# Patient Record
Sex: FEMALE | Race: WHITE | NOT HISPANIC OR LATINO | Employment: OTHER | URBAN - METROPOLITAN AREA
[De-identification: names, ages, dates, MRNs, and addresses within clinical notes are randomized per-mention and may not be internally consistent; named-entity substitution may affect disease eponyms.]

---

## 2017-03-13 ENCOUNTER — OFFICE VISIT (OUTPATIENT)
Dept: INTERNAL MEDICINE | Facility: CLINIC | Age: 72
End: 2017-03-13
Payer: MEDICARE

## 2017-03-13 ENCOUNTER — HOSPITAL ENCOUNTER (OUTPATIENT)
Dept: RADIOLOGY | Facility: HOSPITAL | Age: 72
Discharge: HOME | End: 2017-03-13
Attending: INTERNAL MEDICINE
Payer: MEDICARE

## 2017-03-13 VITALS
SYSTOLIC BLOOD PRESSURE: 120 MMHG | BODY MASS INDEX: 23.25 KG/M2 | OXYGEN SATURATION: 98 % | WEIGHT: 148.13 LBS | RESPIRATION RATE: 16 BRPM | DIASTOLIC BLOOD PRESSURE: 76 MMHG | HEART RATE: 65 BPM | HEIGHT: 67 IN

## 2017-03-13 DIAGNOSIS — J30.81 ALLERGIC RHINITIS DUE TO ANIMAL HAIR AND DANDER, UNSPECIFIED RHINITIS SEASONALITY: ICD-10-CM

## 2017-03-13 DIAGNOSIS — Z86.39 HISTORY OF THYROIDITIS: Primary | ICD-10-CM

## 2017-03-13 DIAGNOSIS — Z78.0 ASYMPTOMATIC MENOPAUSAL STATE: ICD-10-CM

## 2017-03-13 DIAGNOSIS — Z87.828 HISTORY OF EYE TRAUMA: ICD-10-CM

## 2017-03-13 DIAGNOSIS — Z00.00 HEALTH CARE MAINTENANCE: ICD-10-CM

## 2017-03-13 DIAGNOSIS — Z13.6 ENCOUNTER FOR SCREENING FOR CARDIOVASCULAR DISORDERS: ICD-10-CM

## 2017-03-13 DIAGNOSIS — Z85.828 HISTORY OF SCC (SQUAMOUS CELL CARCINOMA) OF SKIN: ICD-10-CM

## 2017-03-13 DIAGNOSIS — H53.9 VISION CHANGES: ICD-10-CM

## 2017-03-13 PROCEDURE — 77080 DXA BONE DENSITY AXIAL: CPT | Mod: TC,PO

## 2017-03-13 PROCEDURE — 99999 PR PBB SHADOW E&M-EST. PATIENT-LVL IV: CPT | Mod: PBBFAC,,, | Performed by: INTERNAL MEDICINE

## 2017-03-13 PROCEDURE — 99204 OFFICE O/P NEW MOD 45 MIN: CPT | Mod: S$PBB,,, | Performed by: INTERNAL MEDICINE

## 2017-03-13 PROCEDURE — 77080 DXA BONE DENSITY AXIAL: CPT | Mod: 26,,, | Performed by: RADIOLOGY

## 2017-03-13 RX ORDER — CARBOXYMETHYLCELLULOSE SODIUM 5 MG/ML
1 SOLUTION/ DROPS OPHTHALMIC 3 TIMES DAILY PRN
COMMUNITY

## 2017-03-13 RX ORDER — CALCIUM CARBONATE 600 MG
600 TABLET ORAL 2 TIMES DAILY WITH MEALS
COMMUNITY
End: 2017-05-22 | Stop reason: ALTCHOICE

## 2017-03-13 NOTE — PROGRESS NOTES
"HISTORY OF PRESENT ILLNESS:  Pt. is a 71 y.o. female presents to establish care from Dr. Elias Schmitz and monitoring of her mild renal insufficiency, hypothyroidism,osteopenia.  She recently had allergic episode with cat allergy with wheezing.  Deosn't wish to see allergist.  Has history of abnormal mammogram:    6/13/16:  "late follow-up from prior benign  ultrasound biopsy of the left 1:00, 3 cm from the nipple, lesion. Pathology was  fibrocystic change with apocrine metaplasia and papillary apocrine metaplasia.  Usual ductal hyperplasia with epithelial/luminal calcifications. Focal  sclerosing adenosis with epithelial calcifications. Focally, there are features  of a radial scar/complex sclerosing lesion. Hemosiderin laden macrophages are  identified focally. She has not had surgery."    She is to repeat 6/17.    Lab Results   Component Value Date    ALT 26 01/13/2017    AST 21 01/13/2017     01/13/2017    K 4.5 01/13/2017     01/13/2017    CREATININE 1.03 01/13/2017    BUN 17 01/13/2017    CO2 29 01/13/2017    TSH 2.110 01/13/2017     She has not had CBC or Lipid panel.  TSH is WNL on current meds.  She had remote history of anemia.  Had gone to a vision center 3 months ago after trauma to eye.  She would like to see someone different.      ROS:  GENERAL: No fever, chills, fatigability or weight loss.  SKIN: No rashes, itching or changes in color or texture of skin; has history of SCC of right arm, approx. 2010;  HEAD: No headaches or recent head trauma.  EARS: Denies ear pain, discharge or vertigo.  NOSE: No loss of smell, no epistaxis; positive postnasal drip.  MOUTH & THROAT: No hoarseness or change in voice. No excessive gum bleeding.  NODES: Denies swollen glands.  CHEST: Denies NELSON, cyanosis, occ wheezing with allergic response, no cough and sputum production.  CARDIOVASCULAR: Denies chest pain, PND, orthopnea or reduced exercise tolerance.  ABDOMEN: Appetite fine. No weight loss. Denies " constipation, diarrhea, abdominal pain, hematemesis or blood in stool.  URINARY: No flank pain, dysuria or hematuria.  PERIPHERAL VASCULAR: No claudication or cyanosis. No edema.  MUSCULOSKELETAL: No joint stiffness or swelling. Denies back pain; is developing bunion;  NEUROLOGIC: Denies numbness    PE:   Vitals:   Vitals:    03/13/17 0827   BP: 120/76   Pulse: 65   Resp: 16     GENERAL: no acute distress, A&Ox3, comfortable.  Female with BMI of 23   HEENT: tympanic membranes clear, nasal mucosa pink, no pharyngeal erythema or exudate  NECK: supple, no cervical lymphadenopathy, no thyromegaly; no supraclavicular nodes;   CHEST:  clear to auscultation bilaterally, no crackles or wheeze; no increased work of breathing;  CARDIOVASCULAR: regular rate and rhythm, no rubs, murmurs or gallops.  ABDOMEN: normal bowel sounds, soft non-tender, non-distended; no palpable organomegaly;   EXT: no clubbing, cyanosis or edema.     ASSESSMENT/PLAN:    History of thyroiditis  -     THYROID PEROXIDASE ANTIBODY; Future; Expected date: 3/13/17    History of SCC (squamous cell carcinoma) of skin  -     Ambulatory referral to Dermatology    [facility] care maintenance  -     Pneumococcal Conjugate Vaccine (13 Valent) (IM)  -     Case request GI: COLONOSCOPY  -     CBC auto differential; Future; Expected date: 3/13/17  -     Hepatitis C antibody; Future; Expected date: 3/13/17  -     Lipid panel; Future; Expected date: 3/13/17  -     DXA Bone Density Spine And Hip_Axial Skeleton; Future; Expected date: 3/13/17    Allergic rhinitis due to animal hair and dander, unspecified rhinitis seasonality  -     CBC auto differential; Future; Expected date: 3/13/17    Encounter for screening for cardiovascular disorders   -     Lipid panel; Future; Expected date: 3/13/17    History of eye trauma    Vision changes  -     Ambulatory referral to Ophthalmology    Asymptomatic menopausal state   -     DXA Bone Density Spine And Hip_Axial Skeleton; Future;  Expected date: 3/13/17      Call if condition changes or worsens.

## 2017-03-13 NOTE — MR AVS SNAPSHOT
[facility]  [address]  [facility] [address]  Phone: [phone]  Fax: [phone]                  Sandhya Madrigal   3/13/2017 8:00 AM   Office Visit    Description:  Female : 1945   Provider:  Nithya Liao MD   Department:  [facility] Medicine           Reason for Visit     Establish Care           Diagnoses this Visit        Comments    History of thyroiditis    -  Primary     History of SCC (squamous cell carcinoma) of skin         Health care maintenance         Allergic rhinitis due to animal hair and dander, unspecified rhinitis seasonality         Encounter for screening for cardiovascular disorders         History of eye trauma         Vision changes         Asymptomatic menopausal state                To Do List           Future Appointments        Provider Department Dept Phone    3/13/2017 12:00 PM [facility] DEXA1 [facility] [phone]      Goals (5 Years of Data)     None      [facility]     [facility] On Call Nurse Care Line - 24/7 Assistance  Registered nurses in the [facility] provide clinical advisement, health education, appointment booking, and other advisory services.  Call for this free service at [phone].             Medications           Message regarding Medications     Verify the changes and/or additions to your medication regime listed below are the same as discussed with your clinician today.  If any of these changes or additions are incorrect, please notify your healthcare provider.             Verify that the below list of medications is an accurate representation of the medications you are currently taking.  If none reported, the list may be blank. If incorrect, please contact your healthcare provider. Carry this list with you in case of emergency.           Current Medications     calcium carbonate (OS-LIS) 600 mg (1,500 mg) Tab Take 600 mg by mouth 2 (two) times daily with meals.    CALCIUM  "CARBONATE/VITAMIN D3 (VITAMIN D-3 ORAL) Take by mouth.    carboxymethylcellulose (REFRESH PLUS) 0.5 % Dpet 1 drop 3 (three) times daily as needed.    cetirizine (ZYRTEC) 10 MG tablet CETIRIZINE HCL 10 MG TABS    fluticasone (FLONASE) 50 mcg/actuation nasal spray 2 sprays by Each Nare route once daily. FLUTICASONE PROPIONATE 50 MCG/ACT SUSP    levothyroxine (SYNTHROID) 75 MCG tablet Take 1 tablet (75 mcg total) by mouth before breakfast. LEVOTHYROXINE SODIUM 75 MCG TABS           Clinical Reference Information           Your Vitals Were     BP Pulse Resp Height Weight SpO2    120/76 65 16 5' 7" (1.702 m) 67.2 kg (148 lb 2.4 oz) 98%    BMI                23.2 kg/m2          Blood Pressure          Most Recent Value    BP  120/76      Allergies as of 3/13/2017     Sulfa (Sulfonamide Antibiotics)    Pcn [Penicillins]    Rifampin      Immunizations Administered on Date of Encounter - 3/13/2017     Name Date Dose VIS Date Route    Pneumococcal Conjugate - 13 Valent 3/13/2017 0.5 mL 11/5/2015 Intramuscular      Orders Placed During Today's Visit      Normal Orders This Visit    Ambulatory referral to Dermatology     Ambulatory referral to Ophthalmology     Case request GI: COLONOSCOPY     Pneumococcal Conjugate Vaccine (13 Valent) (IM)     Future Labs/Procedures Expected by Expires    CBC auto differential  3/13/2017 6/11/2017    DXA Bone Density Spine And Hip_Axial Skeleton  3/13/2017 6/11/2017    Hepatitis C antibody  3/13/2017 5/12/2018    Lipid panel  3/13/2017 5/12/2018    THYROID PEROXIDASE ANTIBODY  3/13/2017 5/12/2018      Language Assistance Services     ATTENTION: Language assistance services are available, free of charge. Please call [phone].      ATENCIÓN: Si habla español, tiene a bryant disposición servicios gratuitos de asistencia lingüística. Llame al [phone].     CHÚ Ý: N?u b?n nói Ti?ng Vi?t, có các d?ch v? h? tr? ngôn ng? mi?n phí dành cho b?n. G?i s? [phone].         [facility] complies with applicable Federal civil rights laws and does not discriminate on the basis of race, color, national origin, age, disability, or sex.

## 2017-03-14 PROBLEM — Z86.39 HISTORY OF THYROIDITIS: Status: ACTIVE | Noted: 2017-03-14

## 2017-03-14 PROBLEM — Z85.828 HISTORY OF SCC (SQUAMOUS CELL CARCINOMA) OF SKIN: Status: ACTIVE | Noted: 2017-03-14

## 2017-03-14 PROBLEM — Z87.828 HISTORY OF EYE TRAUMA: Status: ACTIVE | Noted: 2017-03-14

## 2017-03-14 PROBLEM — J30.81 ALLERGIC RHINITIS DUE TO ANIMAL HAIR AND DANDER: Status: ACTIVE | Noted: 2017-03-14

## 2017-03-14 PROBLEM — H53.9 VISION CHANGES: Status: ACTIVE | Noted: 2017-03-14

## 2017-03-15 ENCOUNTER — PATIENT MESSAGE (OUTPATIENT)
Dept: GASTROENTEROLOGY | Facility: CLINIC | Age: 72
End: 2017-03-15

## 2017-03-16 DIAGNOSIS — E03.9 HYPOTHYROIDISM (ACQUIRED): ICD-10-CM

## 2017-03-16 RX ORDER — FLUTICASONE PROPIONATE 50 MCG
2 SPRAY, SUSPENSION (ML) NASAL DAILY
Qty: 1 BOTTLE | Refills: 3 | Status: SHIPPED | OUTPATIENT
Start: 2017-03-16 | End: 2018-03-16 | Stop reason: SDUPTHER

## 2017-03-16 RX ORDER — LEVOTHYROXINE SODIUM 75 UG/1
75 TABLET ORAL
Qty: 90 TABLET | Refills: 3 | Status: SHIPPED | OUTPATIENT
Start: 2017-03-16 | End: 2018-03-14 | Stop reason: SDUPTHER

## 2017-03-21 NOTE — TELEPHONE ENCOUNTER
----- Message from Lucrecia Alexander sent at 3/21/2017  9:37 AM CDT -----  Contact: self    Needs to know if she needs a podiatrist referral. Please call back at .

## 2017-04-07 ENCOUNTER — TELEPHONE (OUTPATIENT)
Dept: GASTROENTEROLOGY | Facility: CLINIC | Age: 72
End: 2017-04-07

## 2017-04-07 NOTE — TELEPHONE ENCOUNTER
----- Message from Ruthie Jorge sent at 4/7/2017 12:22 PM CDT -----  Contact: patient   Patient calling in regards to her upcoming Colonoscopy on 5/2/17. She wants to reschedule for a Friday. Please advise.  Call back .  Thanks!

## 2017-04-10 ENCOUNTER — TELEPHONE (OUTPATIENT)
Dept: INTERNAL MEDICINE | Facility: CLINIC | Age: 72
End: 2017-04-10

## 2017-04-10 NOTE — TELEPHONE ENCOUNTER
Spoke to pt  Rescheduled colonoscopy for 5/12/17    Pt has questions concerning her resent physical with Dr Liao.  Pt informed message will be sent to Dr Liao's nurse

## 2017-04-10 NOTE — TELEPHONE ENCOUNTER
Received incoming call from patient. Stated to her what Dr. Liao noted. Podiatry appointment scheduled. Verbalized understanding.

## 2017-04-10 NOTE — TELEPHONE ENCOUNTER
Spoke to patient and states she had bone density test and is needing results. Stated to her results on bone density and wants to know if her thinning is a little or a lot?    Pt also has a Bunion on foot and it  is pronounced and is wanting to know who to see about this? Please advise. Thanks!

## 2017-04-10 NOTE — TELEPHONE ENCOUNTER
Notify pt. That her FRAX score for osteopenia was 1.4%.  We suggest aggressive treatment if greater than 3%.  Have her get appt. With podiatry for bunion.

## 2017-04-11 ENCOUNTER — OFFICE VISIT (OUTPATIENT)
Dept: PODIATRY | Facility: CLINIC | Age: 72
End: 2017-04-11
Payer: MEDICARE

## 2017-04-11 ENCOUNTER — HOSPITAL ENCOUNTER (OUTPATIENT)
Dept: RADIOLOGY | Facility: HOSPITAL | Age: 72
Discharge: HOME | End: 2017-04-11
Attending: PODIATRIST
Payer: MEDICARE

## 2017-04-11 ENCOUNTER — TELEPHONE (OUTPATIENT)
Dept: PODIATRY | Facility: CLINIC | Age: 72
End: 2017-04-11

## 2017-04-11 VITALS — BODY MASS INDEX: 23.7 KG/M2 | WEIGHT: 151 LBS | HEIGHT: 67 IN

## 2017-04-11 DIAGNOSIS — M20.10 HALLUX ABDUCTO VALGUS, UNSPECIFIED LATERALITY: ICD-10-CM

## 2017-04-11 DIAGNOSIS — M20.10 HALLUX ABDUCTO VALGUS, UNSPECIFIED LATERALITY: Primary | ICD-10-CM

## 2017-04-11 PROCEDURE — 99203 OFFICE O/P NEW LOW 30 MIN: CPT | Mod: S$PBB,,, | Performed by: PODIATRIST

## 2017-04-11 PROCEDURE — 73630 X-RAY EXAM OF FOOT: CPT | Mod: 26,RT,, | Performed by: RADIOLOGY

## 2017-04-11 PROCEDURE — 99999 PR PBB SHADOW E&M-EST. PATIENT-LVL II: CPT | Mod: PBBFAC,,, | Performed by: PODIATRIST

## 2017-04-11 PROCEDURE — 73630 X-RAY EXAM OF FOOT: CPT | Mod: TC,PO,RT

## 2017-04-11 RX ORDER — DICLOFENAC SODIUM 10 MG/G
2 GEL TOPICAL 4 TIMES DAILY
Qty: 100 G | Refills: 2 | Status: ON HOLD | OUTPATIENT
Start: 2017-04-11 | End: 2017-05-12 | Stop reason: CLARIF

## 2017-04-11 NOTE — MR AVS SNAPSHOT
[facility]  [address] [facility] [address] [place] [address]  Phone: [phone]                  Sandhya Madrigal   2017 9:30 AM   Office Visit    Description:  Female : 1945   Provider:  Scott Garcia DPM   Department:  [facility]           Reason for Visit     Bunions           Diagnoses this Visit        Comments    Hallux abducto valgus, unspecified laterality    -  Primary            To Do List           Future Appointments        Provider Department Dept Phone    2017 11:30 AM [facility] XR2 [facility] [phone]    2017 10:00 AM Nga Aldrich MD [facility] [phone]      Your Future Surgeries/Procedures     May 12, 2017   Surgery with Andrea Jacobs Jr., MD   [facility] [place]    [address] [facility] [address] [place] [id number] [phone]              Goals (5 Years of Data)     None       These Medications        Disp Refills Start End    diclofenac sodium 1 % Gel 100 g 2 2017     Apply 2 g topically 4 (four) times daily. - Topical    Pharmacy: [facility] [address] [address] [facility] Ph #: [phone]         [facility]     [facility] Nurse Care Line -  Assistance  Unless otherwise directed by your provider, please contact [facility], our nurse care line that is available for  assistance.     Registered nurses in the [facility] provide: appointment scheduling, clinical advisement, health education, and other advisory services.  Call: [phone] (toll free)               Medications           Message regarding Medications     Verify the changes and/or additions to your medication regime listed below are the same as discussed with your clinician today.  If any of these changes or additions are incorrect, please notify your healthcare provider.        START taking these NEW medications        Refills    diclofenac sodium 1 %  "Gel 2    Sig: Apply 2 g topically 4 (four) times daily.    Class: Normal    Route: Topical           Verify that the below list of medications is an accurate representation of the medications you are currently taking.  If none reported, the list may be blank. If incorrect, please contact your healthcare provider. Carry this list with you in case of emergency.           Current Medications     calcium carbonate (OS-LIS) 600 mg (1,500 mg) Tab Take 600 mg by mouth 2 (two) times daily with meals.    CALCIUM CARBONATE/VITAMIN D3 (VITAMIN D-3 ORAL) Take by mouth.    carboxymethylcellulose (REFRESH PLUS) 0.5 % Dpet 1 drop 3 (three) times daily as needed.    cetirizine (ZYRTEC) 10 MG tablet CETIRIZINE HCL 10 MG TABS    fluticasone (FLONASE) 50 mcg/actuation nasal spray 2 sprays by Each Nare route once daily. FLUTICASONE PROPIONATE 50 MCG/ACT SUSP    levothyroxine (SYNTHROID) 75 MCG tablet Take 1 tablet (75 mcg total) by mouth before breakfast. LEVOTHYROXINE SODIUM 75 MCG TABS    diclofenac sodium 1 % Gel Apply 2 g topically 4 (four) times daily.           Clinical Reference Information           Your Vitals Were     Height Weight BMI          5' 7" (1.702 m) 68.5 kg (151 lb 0.2 oz) 23.65 kg/m2        Allergies as of 4/11/2017     Sulfa (Sulfonamide Antibiotics)    Pcn [Penicillins]    Rifampin      Immunizations Administered on Date of Encounter - 4/11/2017     None      Orders Placed During Today's Visit      Normal Orders This Visit    ORTHOTIC DEVICE (DME)     Future Labs/Procedures Expected by Expires    X-Ray Foot Complete Right  4/11/2017 4/11/2018      Language Assistance Services     ATTENTION: Language assistance services are available, free of charge. Please call [phone].      ATENCIÓN: Si tommyla jordan, tiene a bryant disposición servicios gratuitos de asistencia lingüística. Llame al [phone].     AARON Ý: N?u b?n nói Ti?ng Vi?t, có các d?ch v? h? tr? ngôn ng? mi?n phí dành cho b?n. G?i s? " [phone].         [facility] complies with applicable Federal civil rights laws and does not discriminate on the basis of race, color, national origin, age, disability, or sex.

## 2017-04-11 NOTE — LETTER
April 11, 2017      Nithya Liao MD  [address] [facility] [address] [place] [address]           [facility]  [address] [facility] [address] [place] [address]  Phone: [phone]          Patient: Sandhya Madrigal   MR Number: [id number]   YOB: 1945   Date of Visit: 4/11/2017       Dear Dr. Nithya Liao:    Thank you for referring Sandhya Madrigal to me for evaluation. Attached you will find relevant portions of my assessment and plan of care.    If you have questions, please do not hesitate to call me. I look forward to following Sandhya Madrigal along with you.    Sincerely,    Scott Garcia, DPLEE    Enclosure  CC:  No Recipients    If you would like to receive this communication electronically, please contact [email or web address] or [phone] to request more information on [facility] [employer] [employer] access.    For providers and/or their staff who would like to refer a patient to [facility], please contact us through our one-stop-shop provider referral line, [facility] , at [phone].    If you feel you have received this communication in error or would no longer like to receive these types of communications, please e-mail [email or web address]

## 2017-04-11 NOTE — PROGRESS NOTES
Subjective:      Patient ID: Sandhya Madrigal is a 71 y.o. female.    Chief Complaint: Bunions (right)    Bunion with bump and crooked toe right foot gradually increasing past year or so.  Gradual onset, worsening over past several lmonths, aggravated by increased weight bearing, shoe gear, pressure.  No previous medical treatment.  OTC pain med not helping.  Denies trauma and surgery right foot.      Review of Systems   Constitution: Negative for chills, diaphoresis, fever, malaise/fatigue and night sweats.   Cardiovascular: Negative for claudication, cyanosis, leg swelling and syncope.   Skin: Negative for color change, dry skin, nail changes, rash, suspicious lesions and unusual hair distribution.   Musculoskeletal: Positive for joint pain (discomfort, not pain right 1st mtpj). Negative for falls, joint swelling, muscle cramps, muscle weakness and stiffness.   Gastrointestinal: Negative for constipation, diarrhea, nausea and vomiting.   Neurological: Negative for brief paralysis, disturbances in coordination, focal weakness, numbness, paresthesias, sensory change and tremors.           Objective:      Physical Exam   Constitutional: She appears well-developed and well-nourished. She is cooperative. No distress.   Cardiovascular:   Pulses:       Popliteal pulses are 2+ on the right side, and 2+ on the left side.        Dorsalis pedis pulses are 2+ on the right side, and 2+ on the left side.        Posterior tibial pulses are 2+ on the right side, and 2+ on the left side.   Capillary refill 3 seconds all toes/distal feet, all toes/both feet warm to touch.      Negative lymphadenopathy bilateral popliteal fossa and tarsal tunnel.      Negavie lower extremity edema bilateral.     Musculoskeletal:        Right ankle: Normal. She exhibits normal range of motion, no swelling, no ecchymosis, no deformity, no laceration and normal pulse. Achilles tendon normal. Achilles tendon exhibits no pain, no defect and normal  Correa's test results.   Visible and palpable bunion without pain at dorsomedial 1st metatarsal head right.  Hallux abducted right partially reducible, tracks laterally without being track bound.  No ecchymosis, erythema, edema, or cardinal signs infection or signs of trauma same foot.    Otherwise, Normal angle, base, station of gait. All ten toes without clubbing, cyanosis, or signs of ischemia.  No pain to palpation bilateral lower extremities.  Range of motion, stability, muscle strength, and muscle tone normal bilateral feet and legs.     Lymphadenopathy: No inguinal adenopathy noted on the right or left side.   Negative lymphadenopathy bilateral popliteal fossa and tarsal tunnel.   Neurological: She is alert. She has normal strength. She displays no atrophy and no tremor. No sensory deficit. She exhibits normal muscle tone. She displays no seizure activity. Gait normal.   Reflex Scores:       Patellar reflexes are 2+ on the right side and 2+ on the left side.       Achilles reflexes are 2+ on the right side and 2+ on the left side.  Negative tinel sign to percussion sural, superficial peroneal, deep peroneal, saphenous, and posterior tibial nerves right and left ankles and feet.     Skin: Skin is warm, dry and intact. No abrasion, no bruising, no burn, no ecchymosis, no laceration, no lesion and no rash noted. She is not diaphoretic. No cyanosis or erythema. No pallor. Nails show no clubbing.     Skin is normal age and health appropriate color, turgor, texture, and temperature bilateral lower extremities without ulceration, hyperpigmentation, discoloration, masses nodules or cords palpated.  No ecchymosis, erythema, edema, or cardinal signs of infection bilateral lower extremities.               Assessment:       Encounter Diagnosis   Name Primary?    Hallux abducto valgus, unspecified laterality Yes         Plan:       Sandhya was seen today for bunions.    Diagnoses and all orders for this visit:    Hallux  abducto valgus, unspecified laterality  -     ORTHOTIC DEVICE (DME)  -     X-Ray Foot Complete Right; Future    Other orders  -     diclofenac sodium 1 % Gel; Apply 2 g topically 4 (four) times daily.      I counseled the patient on her conditions, their implications and medical management.        Xrays.    Patient will stretch the tendo achilles complex three times daily as demonstrated in the office.  Literature was dispensed illustrating proper stretching technique.    Patient will obtain over the counter arch supports and wear them in shoes whenever possible.  Athletic shoes intended for walking or running are usually best.    The patient was advised that NSAID-type medications have two very important potential side effects: gastrointestinal irritation including hemorrhage and renal injuries. She was asked to take the medication with food and to stop if she experiences any GI upset. I asked her to call for vomiting, abdominal pain or black/bloody stools. The patient expresses understanding of these issues and questions were answered.    Rx custom orthotics, xrays, voltaren gel prn.    Declines scheduled follow up.          No Follow-up on file.

## 2017-04-11 NOTE — TELEPHONE ENCOUNTER
----- Message from Neeru Taylor sent at 4/11/2017 12:23 PM CDT -----  Contact: Self  Pt would like a call back in regards to setting up automatic f/u appt.     Pt can be contacted at [phone]

## 2017-05-10 ENCOUNTER — TELEPHONE (OUTPATIENT)
Dept: GASTROENTEROLOGY | Facility: CLINIC | Age: 72
End: 2017-05-10

## 2017-05-10 NOTE — TELEPHONE ENCOUNTER
----- Message from Anna Victor sent at 5/10/2017  2:25 PM CDT -----  Contact: self  Patient [phone] is scheduled for a colonoscopy this Friday 05 12 17 and she has some questions concerning her diet before the procedure/please call

## 2017-05-11 NOTE — H&P
"History & Physical - Short Stay  Gastroenterology      SUBJECTIVE:     Procedure: Colonoscopy    Chief Complaint/Indication for Procedure: Screening    History of Present Illness:  Asymptomatic  Office Visit     3/13/2017  [facility]       Nithya Liao MD   Internal Medicine    History of thyroiditis +7 more   Dx    Establish Care   Reason for visit    Progress Notes      HISTORY OF PRESENT ILLNESS:  Pt. is a 71 y.o. female presents to establish care from Dr. Elias Schmitz and monitoring of her mild renal insufficiency, hypothyroidism,osteopenia. She recently had allergic episode with cat allergy with wheezing. Deosn't wish to see allergist. Has history of abnormal mammogram:     6/13/16:  "late follow-up from prior benign  ultrasound biopsy of the left 1:00, 3 cm from the nipple, lesion. Pathology was  fibrocystic change with apocrine metaplasia and papillary apocrine metaplasia.  Usual ductal hyperplasia with epithelial/luminal calcifications. Focal  sclerosing adenosis with epithelial calcifications. Focally, there are features  of a radial scar/complex sclerosing lesion. Hemosiderin laden macrophages are  identified focally. She has not had surgery."     She is to repeat 6/17.    ASSESSMENT/PLAN:     History of thyroiditis  - THYROID PEROXIDASE ANTIBODY; Future; Expected date: 3/13/17     History of SCC (squamous cell carcinoma) of skin  - Ambulatory referral to Dermatology     Health care maintenance  - Pneumococcal Conjugate Vaccine (13 Valent) (IM)  - Case request GI: COLONOSCOPY  - CBC auto differential; Future; Expected date: 3/13/17  - Hepatitis C antibody; Future; Expected date: 3/13/17  - Lipid panel; Future; Expected date: 3/13/17  - DXA Bone Density Spine And Hip_Axial Skeleton; Future; Expected date: 3/13/17     Allergic rhinitis due to animal hair and dander, unspecified rhinitis seasonality  - CBC auto differential; Future; Expected date: 3/13/17     Encounter for screening " for cardiovascular disorders   - Lipid panel; Future; Expected date: 3/13/17     History of eye trauma     Vision changes  - Ambulatory referral to Ophthalmology     Asymptomatic menopausal state   - DXA Bone Density Spine And Hip_Axial Skeleton; Future; Expected date: 3/13/17        Call if condition changes or worsens.          PTA Medications   Medication Sig    calcium carbonate (OS-LIS) 600 mg (1,500 mg) Tab Take 600 mg by mouth 2 (two) times daily with meals.    CALCIUM CARBONATE/VITAMIN D3 (VITAMIN D-3 ORAL) Take by mouth.    carboxymethylcellulose (REFRESH PLUS) 0.5 % Dpet 1 drop 3 (three) times daily as needed.    cetirizine (ZYRTEC) 10 MG tablet CETIRIZINE HCL 10 MG TABS    fluticasone (FLONASE) 50 mcg/actuation nasal spray 2 sprays by Each Nare route once daily. FLUTICASONE PROPIONATE 50 MCG/ACT SUSP    levothyroxine (SYNTHROID) 75 MCG tablet Take 1 tablet (75 mcg total) by mouth before breakfast. LEVOTHYROXINE SODIUM 75 MCG TABS       Review of patient's allergies indicates:   Allergen Reactions    Sulfa (sulfonamide antibiotics)      rash    Pcn [penicillins] Rash    Rifampin Rash        Past Medical History:   Diagnosis Date    Allergy     Squamous cell carcinoma, arm     right    Thyroid disease      Past Surgical History:   Procedure Laterality Date    COLONOSCOPY  ~2006    ([place])   Normal. by her hx.    DENTAL SURGERY      4 dental implants     Family History   Problem Relation Age of Onset    Cancer Mother      breast,melanoma    Arthritis Mother     Alcohol abuse Father     Cancer Maternal Grandmother      breast cancer;     Social History   Substance Use Topics    Smoking status: Never Smoker    Smokeless tobacco: Never Used    Alcohol use 0.0 oz/week     0 Standard drinks or equivalent per week      Comment: 1 beer a night         OBJECTIVE:     Vital Signs (Most Recent)  Temp: 97.5 °F (36.4 °C) (05/12/17 0951)  Pulse: (!) 56 (05/12/17 0951)  Resp: 16 (05/12/17  "0951)  BP: (!) 162/67 (05/12/17 0951)  SpO2: 100 % (05/12/17 0951)    Physical Exam:  : Ht 5' 7" (1.702 m)    Wt 67.2 kg (148 lb 2.4 oz)    BMI 23.2 kg/m2                                                        GENERAL:  Comfortable, in no acute distress.                                 HEENT EXAM:  Nonicteric.  No adenopathy.  Oropharynx is clear.               NECK:  Supple.                                                               LUNGS:  Clear.                                                               CARDIAC:  Regular rate and rhythm.  S1, S2.  No murmur.                      ABDOMEN:  Soft, positive bowel sounds, nontender.  No hepatosplenomegaly or masses.  No rebound or guarding.      EXTREMITIES:  No edema.     MENTAL STATUS:  Alert and oriented.    ASSESSMENT/PLAN:     Assessment: Colorectal cancer screening    Plan: Colonoscopy    Anesthesia Plan:   MAC / General Anaesthesia    ASA Grade: ASA 2 - Patient with mild systemic disease with no functional limitations    MALLAMPATI SCORE: I (soft palate, uvula, fauces, and tonsillar pillars visible)      "

## 2017-05-12 ENCOUNTER — HOSPITAL ENCOUNTER (OUTPATIENT)
Facility: HOSPITAL | Age: 72
Discharge: HOME | End: 2017-05-12
Attending: INTERNAL MEDICINE | Admitting: INTERNAL MEDICINE
Payer: MEDICARE

## 2017-05-12 ENCOUNTER — ANESTHESIA (OUTPATIENT)
Dept: ENDOSCOPY | Facility: HOSPITAL | Age: 72
End: 2017-05-12
Payer: MEDICARE

## 2017-05-12 ENCOUNTER — SURGERY (OUTPATIENT)
Age: 72
End: 2017-05-12

## 2017-05-12 ENCOUNTER — ANESTHESIA EVENT (OUTPATIENT)
Dept: ENDOSCOPY | Facility: HOSPITAL | Age: 72
End: 2017-05-12
Payer: MEDICARE

## 2017-05-12 VITALS
HEIGHT: 67 IN | BODY MASS INDEX: 23.23 KG/M2 | SYSTOLIC BLOOD PRESSURE: 139 MMHG | RESPIRATION RATE: 18 BRPM | TEMPERATURE: 97 F | DIASTOLIC BLOOD PRESSURE: 73 MMHG | OXYGEN SATURATION: 99 % | WEIGHT: 148 LBS | HEART RATE: 62 BPM

## 2017-05-12 VITALS — RESPIRATION RATE: 28 BRPM

## 2017-05-12 DIAGNOSIS — Z12.11 COLON CANCER SCREENING: ICD-10-CM

## 2017-05-12 PROCEDURE — 88305 TISSUE EXAM BY PATHOLOGIST: CPT | Mod: 26,,, | Performed by: PATHOLOGY

## 2017-05-12 PROCEDURE — D9220A PRA ANESTHESIA: Mod: PT,ANES,, | Performed by: ANESTHESIOLOGY

## 2017-05-12 PROCEDURE — 45380 COLONOSCOPY AND BIOPSY: CPT | Mod: PT,,, | Performed by: INTERNAL MEDICINE

## 2017-05-12 PROCEDURE — 63600175 PHARM REV CODE 636 W HCPCS: Mod: PO | Performed by: NURSE ANESTHETIST, CERTIFIED REGISTERED

## 2017-05-12 PROCEDURE — 88305 TISSUE EXAM BY PATHOLOGIST: CPT | Performed by: PATHOLOGY

## 2017-05-12 PROCEDURE — 27201012 HC FORCEPS, HOT/COLD, DISP: Mod: PO | Performed by: INTERNAL MEDICINE

## 2017-05-12 PROCEDURE — D9220A PRA ANESTHESIA: Mod: PT,CRNA,, | Performed by: NURSE ANESTHETIST, CERTIFIED REGISTERED

## 2017-05-12 PROCEDURE — 37000009 HC ANESTHESIA EA ADD 15 MINS: Mod: PO | Performed by: INTERNAL MEDICINE

## 2017-05-12 PROCEDURE — 37000008 HC ANESTHESIA 1ST 15 MINUTES: Mod: PO | Performed by: INTERNAL MEDICINE

## 2017-05-12 PROCEDURE — 45380 COLONOSCOPY AND BIOPSY: CPT | Mod: PO | Performed by: INTERNAL MEDICINE

## 2017-05-12 PROCEDURE — 25000003 PHARM REV CODE 250: Mod: PO | Performed by: INTERNAL MEDICINE

## 2017-05-12 PROCEDURE — 25000003 PHARM REV CODE 250: Mod: PO | Performed by: NURSE ANESTHETIST, CERTIFIED REGISTERED

## 2017-05-12 RX ORDER — SODIUM CHLORIDE, SODIUM LACTATE, POTASSIUM CHLORIDE, CALCIUM CHLORIDE 600; 310; 30; 20 MG/100ML; MG/100ML; MG/100ML; MG/100ML
INJECTION, SOLUTION INTRAVENOUS CONTINUOUS
Status: DISCONTINUED | OUTPATIENT
Start: 2017-05-12 | End: 2017-05-12 | Stop reason: HOSPADM

## 2017-05-12 RX ORDER — PROPOFOL 10 MG/ML
VIAL (ML) INTRAVENOUS
Status: DISCONTINUED | OUTPATIENT
Start: 2017-05-12 | End: 2017-05-12

## 2017-05-12 RX ORDER — GLYCOPYRROLATE 0.2 MG/ML
INJECTION INTRAMUSCULAR; INTRAVENOUS
Status: DISCONTINUED | OUTPATIENT
Start: 2017-05-12 | End: 2017-05-12

## 2017-05-12 RX ORDER — LIDOCAINE HCL/PF 100 MG/5ML
SYRINGE (ML) INTRAVENOUS
Status: DISCONTINUED | OUTPATIENT
Start: 2017-05-12 | End: 2017-05-12

## 2017-05-12 RX ADMIN — SODIUM CHLORIDE, SODIUM LACTATE, POTASSIUM CHLORIDE, AND CALCIUM CHLORIDE: .6; .31; .03; .02 INJECTION, SOLUTION INTRAVENOUS at 10:05

## 2017-05-12 RX ADMIN — PROPOFOL 10 MG: 10 INJECTION, EMULSION INTRAVENOUS at 10:05

## 2017-05-12 RX ADMIN — PROPOFOL 20 MG: 10 INJECTION, EMULSION INTRAVENOUS at 10:05

## 2017-05-12 RX ADMIN — PROPOFOL 75 MG: 10 INJECTION, EMULSION INTRAVENOUS at 10:05

## 2017-05-12 RX ADMIN — LIDOCAINE HYDROCHLORIDE 50 MG: 20 INJECTION, SOLUTION INTRAVENOUS at 10:05

## 2017-05-12 RX ADMIN — GLYCOPYRROLATE 0.2 MG: 0.2 INJECTION, SOLUTION INTRAMUSCULAR; INTRAVENOUS at 10:05

## 2017-05-12 NOTE — DISCHARGE INSTRUCTIONS
Procedural Sedation (Adult)  You have been given medicine by vein to make you sleep during your surgery. This may have included both a pain medicine and sleeping medicine. Most of the effects have worn off. But you may still have some drowsiness for the next 6 to 8 hours.  Home care  Follow these guidelines when you get home:  · For the next 8 hours, you should be watched by a responsible adult. This person should make sure your condition is not getting worse.  · Don't take any medicine by mouth for pain or for sleep during the next 4 hours. These might react with the medicines you were given in the hospital. This could cause a much stronger response than usual.  · Don't drink any alcohol for the next 24 hours.  · Don't drive, operate dangerous machinery, or make important business or personal decisions during the next 24 hours.  Follow-up care  Follow up with your healthcare provider if you are not alert and back to your usual level of activity within 12 hours.  When to seek medical advice  Call your healthcare provider right away if any of these occur:  · Drowsiness gets worse  · Weakness or dizziness gets worse  · Repeated vomiting  · You cannot be awakened   Date Last Reviewed: 10/18/2016  © 2857-8481 [employer]. [address] [place] [address]. All rights reserved. This information is not intended as a substitute for professional medical care. Always follow your healthcare professional's instructions.    PROBIOTICS:  Now that your colon is so cleaned out, now is a good time for a round of PROBIOTICS.  Eat a container of Greek Yogurt, such as OIKOS or CHOBANI,  Or Activia or Dannon    Greek Yogurt.    Or Take a similar Probiotic product such as Align or Culturelle or Tara-Q, every day for a month.                  (The products listed are non-prescription, but you may need to ask the pharmacist for their location.)  Repeat this 3-4 times a year.    High-Fiber Diet  Fiber is in fruits,  vegetables, cereals, and grains. Fiber passes through your body undigested. A high-fiber diet helps food move through your intestinal tract. The added bulk is helpful in preventing constipation. In people with diverticulosis, fiber helps clean out the pouches along the colon wall. It also prevents new pouches from forming. A high-fiber diet reduces the risk of colon cancer. It also lowers blood cholesterol and prevents high blood sugar in people with diabetes.    The fiber-rich foods listed below should be part of your diet. If you are not used to high-fiber foods, start with 1 or 2 foods from this list. Every 3 to 4 days add a new one to your diet. Do this until you are eating 4 high-fiber foods per day. This should give you 20 to 35 grams of fiber a day. It is also important to drink a lot of water when you are on this diet. You should have 6 to 8 glasses of water a day. Water makes the fiber swell and increases the benefit.  Foods high in dietary fiber  The following foods are high in dietary fiber:  · Breads. Breads made with 100% whole-wheat flour; shellie, wheat, or rye crackers; whole-grain tortillas, bran muffins.  · Cereals. Whole-grain and bran cereals with bran (shredded wheat, wheat flakes, raisin bran, corn bran); oatmeal, rolled oats, granola, and brown rice.  · Fruits. Fresh fruits and their edible skins (pears, prunes, raisins, berries, apples, and apricots); bananas, citrus fruit, mangoes, pineapple; and prune juice.  · Nuts. Any nuts and seeds.  · Vegetables. Best served raw or lightly cooked. All types, especially: green peas, celery, eggplant, potatoes, spinach, broccoli, Red Jacket sprouts, winter squash, carrots, cauliflower, soybeans, lentils, and fresh and dried beans of all kinds.  · Other. Popcorn, any spices.  Date Last Reviewed: 8/1/2016  © 0859-8982 [employer]. [address] [place] [address], [place] [address]. All rights reserved. This information is not intended as a substitute for  professional medical care. Always follow your healthcare professional's instructions.

## 2017-05-12 NOTE — ANESTHESIA PREPROCEDURE EVALUATION
05/12/2017  Sandhya Madrigal is a 71 y.o., female.    Anesthesia Evaluation    I have reviewed the Patient Summary Reports.    I have reviewed the Nursing Notes.   I have reviewed the Medications.     Review of Systems  Anesthesia Hx:  No problems with previous Anesthesia Denies Hx of Anesthetic complications    Social:  Non-Smoker    Cardiovascular:   Denies Hypertension.  Denies MI.  Denies CAD.    Denies CABG/stent.   Denies Angina.    Pulmonary:   Denies COPD.  Denies Asthma.  Denies Recent URI.    Renal/:   Denies Chronic Renal Disease.     Hepatic/GI:   Denies GERD. Denies Liver Disease.    Neurological:   Denies TIA. Denies CVA. Denies Seizures.    Endocrine:   Denies Diabetes. Hypothyroidism    Psych:   Denies Psychiatric History.          Physical Exam  General:  Well nourished    Airway/Jaw/Neck:  Airway Findings: Mouth Opening: Normal Tongue: Normal  General Airway Assessment: Adult, Good  Mallampati: II  Improves to II with phonation.  TM Distance: 4-6 cm      Dental:  Dental Findings: In tact   Chest/Lungs:  Chest/Lungs Findings: Clear to auscultation, Normal Respiratory Rate     Heart/Vascular:  Heart Findings: Rate: Normal  Rhythm: Regular Rhythm  Sounds: Normal  Heart murmur: negative       Mental Status:  Mental Status Findings:  Cooperative, Alert and Oriented         Anesthesia Plan  Type of Anesthesia, risks & benefits discussed:  Anesthesia Type:  general  Patient's Preference:   Intra-op Monitoring Plan:   Intra-op Monitoring Plan Comments:   Post Op Pain Control Plan:   Post Op Pain Control Plan Comments:   Induction:   IV  Beta Blocker:  Patient is not currently on a Beta-Blocker (No further documentation required).       Informed Consent: Patient understands risks and agrees with Anesthesia plan.  Questions answered. Anesthesia consent signed with patient.  ASA Score: 2     Day of  Surgery Review of History & Physical:    H&P update referred to the surgeon.         Ready For Surgery From Anesthesia Perspective.

## 2017-05-12 NOTE — TRANSFER OF CARE
"Anesthesia Transfer of Care Note    Patient: Sandhya Madrigal    Procedure(s) Performed: Procedure(s) (LRB):  COLONOSCOPY (N/A)    Patient location: PACU    Anesthesia Type: general    Transport from OR: Transported from OR on 2-3 L/min O2 by NC with adequate spontaneous ventilation    Post pain: adequate analgesia    Post assessment: no apparent anesthetic complications    Post vital signs: stable    Level of consciousness: sedated    Nausea/Vomiting: no nausea/vomiting    Complications: none          Last vitals:   Visit Vitals    BP (!) 162/67 (BP Location: Right arm, Patient Position: Sitting, BP Method: Automatic)    Pulse (!) 56    Temp 36.4 °C (97.5 °F) (Skin)    Resp 16    Ht 5' 7" (1.702 m)    Wt 67.1 kg (148 lb)    SpO2 100%    Breastfeeding No    BMI 23.18 kg/m2     "

## 2017-05-12 NOTE — BRIEF OP NOTE
Discharge Note  Short Stay      SUMMARY     Admit Date: 5/12/2017    Attending Physician: Andrea Jacobs Jr., MD     Discharge Physician: Andrea Jacobs Jr., MD    Discharge Date: 5/12/2017 11:16 AM    Final Diagnosis: Health care maintenance [Z00.00]  Impression: - One 1 to 2 mm polyp in the proximal descending   colon, removed piecemeal using a cold biopsy   forceps. Resected and retrieved.  - Non-bleeding internal hemorrhoids.  - Tortuous sigmoid colon.  - Redundant colon.  - The examination was otherwise normal.  - The examined portion of the ileum was normal.  Recommendation: - Discharge patient to home.  - Await pathology results.  - If the pathology report reveals adenomatous   tissue, then repeat the colonoscopy for surveillance   in 5 years.  - If the pathology report indicates hyperplastic   polyp, then repeat colonoscopy for screening   purposes in 8 years.  - High fiber diet.  - Take a PROBIOTIC, such as a carton of GREEK YOGURT   (Chobani or Oikos, or Activia or Dannon); or tablets   of ALIGN or CULTURELLE or TRACY-Q (all   non-prescription), every day for a month.  - Call the [facility] clinic in 2 weeks for reports (if   you haven't heard from us sooner) [phone].  - Continue present medications.      Disposition: HOME OR SELF CARE    Patient Instructions:   Current Discharge Medication List      CONTINUE these medications which have NOT CHANGED    Details   calcium carbonate (OS-LIS) 600 mg (1,500 mg) Tab Take 600 mg by mouth 2 (two) times daily with meals.      CALCIUM CARBONATE/VITAMIN D3 (VITAMIN D-3 ORAL) Take by mouth.      carboxymethylcellulose (REFRESH PLUS) 0.5 % Dpet 1 drop 3 (three) times daily as needed.      cetirizine (ZYRTEC) 10 MG tablet CETIRIZINE HCL 10 MG TABS      fluticasone (FLONASE) 50 mcg/actuation nasal spray 2 sprays by Each Nare route once daily. FLUTICASONE PROPIONATE 50 MCG/ACT SUSP  Qty: 1 Bottle, Refills: 3      levothyroxine (SYNTHROID) 75 MCG tablet Take 1 tablet (75  mcg total) by mouth before breakfast. LEVOTHYROXINE SODIUM 75 MCG TABS  Qty: 90 tablet, Refills: 3    Associated Diagnoses: Hypothyroidism (acquired)             Discharge Procedure Orders (must include Diet, Follow-up, Activity)    Follow Up:  Follow up with PCP as per your routine.  Please follow a high fiber diet.  Activity as tolerated.    No driving day of procedure.    PROBIOTICS:  Now that your colon is so cleaned out, now is a good time for a round of PROBIOTICS.  Eat a container of Greek Yogurt, such as OIKOS or CHOBANI,  Or Activia or Dannon    Greek Yogurt.    Or Take a similar Probiotic product such as Align or Culturelle or Tara-Q, every day for a month.                  (The products listed are non-prescription, but you may need to ask the pharmacist for their location.)  Repeat this 3-4 times a year.

## 2017-05-12 NOTE — IP AVS SNAPSHOT
[facility]  [address]  [place] [address]  Phone: [phone]           Patient Discharge Instructions   Our goal is to set you up for success. This packet includes information on your condition, medications, and your home care.  It will help you care for yourself to prevent having to return to the hospital.     Please ask your nurse if you have any questions.      There are many details to remember when preparing to leave the hospital. Here is what you will need to do:    1. Take your medicine. If you are prescribed medications, review your Medication List on the following pages. You may have new medications to  at the pharmacy and others that you'll need to stop taking. Review the instructions for how and when to take your medications. Talk with your doctor or nurses if you are unsure of what to do.     2. Go to your follow-up appointments. Specific follow-up information is listed in the following pages. Your may be contacted by a nurse or clinical provider about future appointments. Be sure we have all of the phone numbers to reach you. Please contact your provider's office if you are unable to make an appointment.     3. Watch for warning signs. Your doctor or nurse will give you detailed warning signs to watch for and when to call for assistance. These instructions may also include educational information about your condition. If you experience any of warning signs to your health, call your doctor.           [facility]  Unless otherwise directed by your provider, please   contact [facility], our nurse care line   that is available for 24/7 assistance.     [phone] (toll-free)     Registered nurses in the [facility]   provide: appointment scheduling, clinical advisement, health education, and other advisory services.                  ** Verify the list of medication(s) below is accurate and up to date. Carry this with you in case of  emergency. If your medications have changed, please notify your healthcare provider.             Medication List      CONTINUE taking these medications        Additional Info                      calcium carbonate 600 mg (1,500 mg) Tab   Commonly known as:  OS-LIS   Refills:  0   Dose:  600 mg    Instructions:  Take 600 mg by mouth 2 (two) times daily with meals.     Begin Date    AM    Noon    PM    Bedtime       carboxymethylcellulose 0.5 % Dpet   Commonly known as:  REFRESH PLUS   Refills:  0   Dose:  1 drop    Instructions:  1 drop 3 (three) times daily as needed.     Begin Date    AM    Noon    PM    Bedtime       cetirizine 10 MG tablet   Commonly known as:  ZYRTEC   Refills:  0    Instructions:  CETIRIZINE HCL 10 MG TABS     Begin Date    AM    Noon    PM    Bedtime       fluticasone 50 mcg/actuation nasal spray   Commonly known as:  FLONASE   Quantity:  1 Bottle   Refills:  3   Dose:  2 spray    Instructions:  2 sprays by Each Nare route once daily. FLUTICASONE PROPIONATE 50 MCG/ACT SUSP     Begin Date    AM    Noon    PM    Bedtime       levothyroxine 75 MCG tablet   Commonly known as:  SYNTHROID   Quantity:  90 tablet   Refills:  3   Dose:  75 mcg    Instructions:  Take 1 tablet (75 mcg total) by mouth before breakfast. LEVOTHYROXINE SODIUM 75 MCG TABS     Begin Date    AM    Noon    PM    Bedtime       VITAMIN D-3 ORAL   Refills:  0    Instructions:  Take by mouth.     Begin Date    AM    Noon    PM    Bedtime                  Please bring to all follow up appointments:    1. A copy of your discharge instructions.  2. All medicines you are currently taking in their original bottles.  3. Identification and insurance card.    Please arrive 15 minutes ahead of scheduled appointment time.    Please call 24 hours in advance if you must reschedule your appointment and/or time.        Your Scheduled Appointments     May 22, 2017 10:00 AM CDT   Consult with Nga Aldrich MD   [facility]  ([facility] [place])    [address]  [place] [address]   [phone]                Discharge Instructions     Future Orders    Activity as tolerated     Comments:    No driving for 24 hours.        Discharge Instructions         Procedural Sedation (Adult)  You have been given medicine by vein to make you sleep during your surgery. This may have included both a pain medicine and sleeping medicine. Most of the effects have worn off. But you may still have some drowsiness for the next 6 to 8 hours.  Home care  Follow these guidelines when you get home:  · For the next 8 hours, you should be watched by a responsible adult. This person should make sure your condition is not getting worse.  · Don't take any medicine by mouth for pain or for sleep during the next 4 hours. These might react with the medicines you were given in the hospital. This could cause a much stronger response than usual.  · Don't drink any alcohol for the next 24 hours.  · Don't drive, operate dangerous machinery, or make important business or personal decisions during the next 24 hours.  Follow-up care  Follow up with your healthcare provider if you are not alert and back to your usual level of activity within 12 hours.  When to seek medical advice  Call your healthcare provider right away if any of these occur:  · Drowsiness gets worse  · Weakness or dizziness gets worse  · Repeated vomiting  · You cannot be awakened   Date Last Reviewed: 10/18/2016  © 0215-2955 [employer]. [address] [place] [address]. All rights reserved. This information is not intended as a substitute for professional medical care. Always follow your healthcare professional's instructions.    PROBIOTICS:  Now that your colon is so cleaned out, now is a good time for a round of PROBIOTICS.  Eat a container of Greek Yogurt, such as OIKOS or CHOBANI,  Or Activia or Dannon    Greek Yogurt.    Or Take a similar Probiotic product such as Align  or Culturelle or Tara-Q, every day for a month.                  (The products listed are non-prescription, but you may need to ask the pharmacist for their location.)  Repeat this 3-4 times a year.    High-Fiber Diet  Fiber is in fruits, vegetables, cereals, and grains. Fiber passes through your body undigested. A high-fiber diet helps food move through your intestinal tract. The added bulk is helpful in preventing constipation. In people with diverticulosis, fiber helps clean out the pouches along the colon wall. It also prevents new pouches from forming. A high-fiber diet reduces the risk of colon cancer. It also lowers blood cholesterol and prevents high blood sugar in people with diabetes.    The fiber-rich foods listed below should be part of your diet. If you are not used to high-fiber foods, start with 1 or 2 foods from this list. Every 3 to 4 days add a new one to your diet. Do this until you are eating 4 high-fiber foods per day. This should give you 20 to 35 grams of fiber a day. It is also important to drink a lot of water when you are on this diet. You should have 6 to 8 glasses of water a day. Water makes the fiber swell and increases the benefit.  Foods high in dietary fiber  The following foods are high in dietary fiber:  · Breads. Breads made with 100% whole-wheat flour; shellie, wheat, or rye crackers; whole-grain tortillas, bran muffins.  · Cereals. Whole-grain and bran cereals with bran (shredded wheat, wheat flakes, raisin bran, corn bran); oatmeal, rolled oats, granola, and brown rice.  · Fruits. Fresh fruits and their edible skins (pears, prunes, raisins, berries, apples, and apricots); bananas, citrus fruit, mangoes, pineapple; and prune juice.  · Nuts. Any nuts and seeds.  · Vegetables. Best served raw or lightly cooked. All types, especially: green peas, celery, eggplant, potatoes, spinach, broccoli, [place] sprouts, winter squash, carrots, cauliflower, soybeans, lentils, and fresh and  "dried beans of all kinds.  · Other. Popcorn, any spices.  Date Last Reviewed: 8/1/2016  © 2416-3812 [employer]. [address] [place] [address], [place] [address]. All rights reserved. This information is not intended as a substitute for professional medical care. Always follow your healthcare professional's instructions.            Primary Diagnosis     Your primary diagnosis was:  Screen For Colon Cancer      Admission Information     Date & Time Provider Department CSN    5/12/2017  9:15 AM Andrea Jacobs Jr., MD [facility] [id number]      Care Providers     Provider Role Specialty Primary office phone    Andrea Jacobs Jr., MD Attending Provider Gastroenterology [phone]    Andrea Jacobs Jr., MD Surgeon  Gastroenterology [phone]      Your Vitals Were     BP Pulse Temp Resp Height Weight    139/73 (Patient Position: Lying, BP Method: Automatic) 62 97.2 °F (36.2 °C) 18 5' 7" (1.702 m) 67.1 kg (148 lb)    SpO2 BMI             99% 23.18 kg/m2         Recent Lab Values     No lab values to display.      Pending Labs     Order Current Status    Specimen to Pathology - Surgery Collected (05/12/17 1050)      Allergies as of 5/12/2017        Reactions    Sulfa (Sulfonamide Antibiotics)     rash    Pcn [Penicillins] Rash    Rifampin Rash      Advance Directives     An advance directive is a document which, in the event you are no longer able to make decisions for yourself, tells your healthcare team what kind of treatment you do or do not want to receive, or who you would like to make those decisions for you.  If you do not currently have an advance directive, [facility] encourages you to create one.  For more information call:  [phone]), [phone] ([phone]),  or log on to [email or web address].        Language Assistance Services     ATTENTION: Language assistance services are available, free of charge. Please call [phone].      ATENCIÓN: Si habla " español, tiene a bryant disposición servicios gratuitos de asistencia lingüística. Jaimie al [phone].     AARON Ý: N?u b?n nói Ti?ng Vi?t, có các d?ch v? h? tr? ngôn ng? mi?n phí dành cho b?n. G?i s? [phone].         [facility] complies with applicable Federal civil rights laws and does not discriminate on the basis of race, color, national origin, age, disability, or sex.

## 2017-05-12 NOTE — ANESTHESIA POSTPROCEDURE EVALUATION
"Anesthesia Post Evaluation    Patient: Sandhya Madrigal    Procedure(s) Performed: Procedure(s) (LRB):  COLONOSCOPY (N/A)    Final Anesthesia Type: general  Patient location during evaluation: PACU  Patient participation: Yes- Able to Participate  Level of consciousness: awake and alert and oriented  Post-procedure vital signs: reviewed and stable  Pain management: adequate  Airway patency: patent  PONV status at discharge: No PONV  Anesthetic complications: no      Cardiovascular status: blood pressure returned to baseline  Respiratory status: unassisted, spontaneous ventilation and room air  Hydration status: euvolemic  Follow-up not needed.        Visit Vitals    /73 (Patient Position: Lying, BP Method: Automatic)    Pulse 62    Temp 36.2 °C (97.2 °F)    Resp 18    Ht 5' 7" (1.702 m)    Wt 67.1 kg (148 lb)    SpO2 99%    Breastfeeding No    BMI 23.18 kg/m2       Pain/Emerald Score: Pain Assessment Performed: Yes (5/12/2017 11:10 AM)  Presence of Pain: non-verbal indicators present (5/12/2017 11:10 AM)  Emerald Score: 8 (5/12/2017 11:10 AM)      "

## 2017-05-15 ENCOUNTER — TELEPHONE (OUTPATIENT)
Dept: FAMILY MEDICINE | Facility: CLINIC | Age: 72
End: 2017-05-15

## 2017-05-15 DIAGNOSIS — R92.8 ABNORMAL MAMMOGRAM OF BOTH BREASTS: Primary | ICD-10-CM

## 2017-05-15 NOTE — TELEPHONE ENCOUNTER
----- Message from Gauri Rodriguez sent at 5/15/2017  2:57 PM CDT -----  Contact: Patient  Patient needs an order for Mammo to be scheduled at [facility]. Patient needs to have this scheduled after 6-13-17. This is the first time in a while she didn't have to have one every six months. Please call patient at [phone] to schedule.

## 2017-05-16 NOTE — TELEPHONE ENCOUNTER
----- Message from Alyse Villalba sent at 5/16/2017  4:00 PM CDT -----  Please fax diagnosis bilateral diagnosic mammogram an breast ultrasound to [phone].  Any questions call  [facility] at [phone].

## 2017-05-16 NOTE — TELEPHONE ENCOUNTER
----- Message from Nurys Veloz sent at 5/16/2017  2:05 PM CDT -----  Contact: self [phone]  Patient is requesting a call back from the nurse [facility].    Please call the patient upon request at phone number [phone].

## 2017-05-17 NOTE — TELEPHONE ENCOUNTER
----- Message from Mariely Reyna sent at 5/17/2017  8:21 AM CDT -----  Patient stated that an order for mammogram was suppose to be sent to [facility]/they have not received/please fax to [phone] or if any questions call patient back at [phone].

## 2017-05-17 NOTE — TELEPHONE ENCOUNTER
----- Message from Cole Braun sent at 5/17/2017  3:43 PM CDT -----  Contact: Patient  Patient called requesting a call back at [phone] regarding order for mammogram lack the diagnostic code,order has for one breast,patient has two breasts. Thanks,

## 2017-05-17 NOTE — TELEPHONE ENCOUNTER
Dr Keshawn [facility] has not received our fax. i have faxed orders several times. They are asking for us to put it in epic since they use epic over there. She will need u/s of left breast also.

## 2017-05-22 ENCOUNTER — INITIAL CONSULT (OUTPATIENT)
Dept: DERMATOLOGY | Facility: CLINIC | Age: 72
End: 2017-05-22
Payer: MEDICARE

## 2017-05-22 VITALS — BODY MASS INDEX: 23.23 KG/M2 | WEIGHT: 148 LBS | HEIGHT: 67 IN

## 2017-05-22 DIAGNOSIS — L82.1 SEBORRHEIC KERATOSES: ICD-10-CM

## 2017-05-22 DIAGNOSIS — L91.8 CUTANEOUS SKIN TAGS: ICD-10-CM

## 2017-05-22 DIAGNOSIS — Z85.828 PERSONAL HISTORY OF MALIGNANT NEOPLASM OF SKIN: Primary | ICD-10-CM

## 2017-05-22 DIAGNOSIS — Z12.83 SKIN CANCER SCREENING: ICD-10-CM

## 2017-05-22 DIAGNOSIS — L57.0 ACTINIC KERATOSIS: ICD-10-CM

## 2017-05-22 PROCEDURE — 99999 PR PBB SHADOW E&M-EST. PATIENT-LVL II: CPT | Mod: PBBFAC,,, | Performed by: DERMATOLOGY

## 2017-05-22 PROCEDURE — 17000 DESTRUCT PREMALG LESION: CPT | Mod: PBBFAC,PO | Performed by: DERMATOLOGY

## 2017-05-22 PROCEDURE — 17000 DESTRUCT PREMALG LESION: CPT | Mod: S$PBB,,, | Performed by: DERMATOLOGY

## 2017-05-22 PROCEDURE — 99203 OFFICE O/P NEW LOW 30 MIN: CPT | Mod: 25,S$PBB,, | Performed by: DERMATOLOGY

## 2017-05-22 PROCEDURE — 99212 OFFICE O/P EST SF 10 MIN: CPT | Mod: PBBFAC,PO | Performed by: DERMATOLOGY

## 2017-05-22 NOTE — LETTER
May 22, 2017      Nithya Liao MD  [address] [facility] [place] [address]           [facility]  [address] [place] [address]  Phone: [phone]  Fax: [phone]          Patient: Sandhya Madrigal   MR Number: [id number]   YOB: 1945   Date of Visit: 5/22/2017       Dear Dr. Nithya Liao:    Thank you for referring Sandhya Madrigal to me for evaluation. Attached you will find relevant portions of my assessment and plan of care.    If you have questions, please do not hesitate to call me. I look forward to following Sandhya Madrigal along with you.    Sincerely,    Nga Aldrich MD    Enclosure  CC:  No Recipients    If you would like to receive this communication electronically, please contact [email or web address] or [phone] to request more information on [employer] access.    For providers and/or their staff who would like to refer a patient to [facility], please contact us through our one-stop-shop provider referral line, [facility], at [phone].    If you feel you have received this communication in error or would no longer like to receive these types of communications, please e-mail [email or web address]

## 2017-05-22 NOTE — PROGRESS NOTES
Subjective:       Patient ID:  Sandhya Madrigal is a 71 y.o. female who presents for No chief complaint on file.    Presents today for IV skin check.  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  Has never had FBSE.     Neg family history of skin cancer.  Mother  from throat cancer.    growth on right hand, month, asx, no tx.     Growths bilateral axilla, bothersome, desires removal. Occasionally tender.     Review of Systems   Constitutional: Negative for weight loss, weight gain and fatigue.   Skin: Positive for daily sunscreen use, activity-related sunscreen use and wears hat.   Hematologic/Lymphatic: Bruises/bleeds easily.        Objective:    Physical Exam   Skin:                          Diagram Legend     Erythematous scaling macule/papule c/w actinic keratosis       Vascular papule c/w angioma      Pigmented verrucoid papule/plaque c/w seborrheic keratosis      Yellow umbilicated papule c/w sebaceous hyperplasia      Irregularly shaped tan macule c/w lentigo     1-2 mm smooth white papules consistent with Milia      Movable subcutaneous cyst with punctum c/w epidermal inclusion cyst      Subcutaneous movable cyst c/w pilar cyst      Firm pink to brown papule c/w dermatofibroma      Pedunculated fleshy papule(s) c/w skin tag(s)      Evenly pigmented macule c/w junctional nevus     Mildly variegated pigmented, slightly irregular-bordered macule c/w mildly atypical nevus      Flesh colored to evenly pigmented papule c/w intradermal nevus       Pink pearly papule/plaque c/w basal cell carcinoma      Erythematous hyperkeratotic cursted plaque c/w SCC      Surgical scar with no sign of skin cancer recurrence      Open and closed comedones      Inflammatory papules and pustules      Verrucoid papule consistent consistent with wart     Erythematous eczematous patches and plaques     Dystrophic onycholytic nail with subungual debris c/w onychomycosis     Umbilicated  papule    Erythematous-base heme-crusted tan verrucoid plaque consistent with inflamed seborrheic keratosis     Erythematous Silvery Scaling Plaque c/w Psoriasis     See annotation      Assessment / Plan:        Personal history of malignant neoplasm of skin  Area of previous NMSC examined. Site well healed with no signs of recurrence.    Total body skin examination performed today including at least 12 points as noted in physical examination. No lesions suspicious for malignancy noted.      Skin cancer screening  Area(s) of previous NMSC evaluated with no signs of recurrence.    Upper body skin examination performed today including at least 6 points as noted in physical examination. No lesions suspicious for malignancy noted.      Cutaneous skin tags, axilla  Reassurance given to patient. No treatment is necessary.   Treatment of benign, asymptomatic lesions may be considered cosmetic.  $232 for removal of up to 14 lesions    Actinic keratosis, right hand  Cryosurgery Procedure Note    Verbal consent from the patient is obtained and the patient is aware of the precancerous quality and need for treatment of these lesions. Liquid nitrogen cryosurgery is applied to the 1 actinic keratoses, as detailed in the physical exam, to produce a freeze injury. The patient is aware that blisters may form and is instructed on wound care with gentle cleansing and use of vaseline ointment to keep moist until healed. The patient is supplied a handout on cryosurgery and is instructed to call if lesions do not completely resolve. Discussed risk postinflammatory pigmentary changes.       Seborrheic keratoses, face/arm/trunk  These are benign inherited growths without a malignant potential. Reassurance given to patient. No treatment is necessary.                Return for skin tag removal.

## 2017-10-23 ENCOUNTER — OFFICE VISIT (OUTPATIENT)
Dept: FAMILY MEDICINE | Facility: CLINIC | Age: 72
End: 2017-10-23
Payer: MEDICARE

## 2017-10-23 VITALS
SYSTOLIC BLOOD PRESSURE: 110 MMHG | DIASTOLIC BLOOD PRESSURE: 68 MMHG | HEART RATE: 68 BPM | RESPIRATION RATE: 16 BRPM | HEIGHT: 67 IN | OXYGEN SATURATION: 96 % | TEMPERATURE: 99 F | WEIGHT: 141.75 LBS | BODY MASS INDEX: 22.25 KG/M2

## 2017-10-23 DIAGNOSIS — N39.0 URINARY TRACT INFECTION WITH HEMATURIA, SITE UNSPECIFIED: ICD-10-CM

## 2017-10-23 DIAGNOSIS — R31.9 URINARY TRACT INFECTION WITH HEMATURIA, SITE UNSPECIFIED: ICD-10-CM

## 2017-10-23 DIAGNOSIS — R35.0 URINARY FREQUENCY: Primary | ICD-10-CM

## 2017-10-23 DIAGNOSIS — R30.9 PAINFUL URINATION: ICD-10-CM

## 2017-10-23 LAB
BACTERIA #/AREA URNS HPF: ABNORMAL /HPF
BILIRUB UR QL STRIP: NEGATIVE
CLARITY UR: CLEAR
COLOR UR: YELLOW
GLUCOSE UR QL STRIP: NEGATIVE
HGB UR QL STRIP: ABNORMAL
KETONES UR QL STRIP: NEGATIVE
LEUKOCYTE ESTERASE UR QL STRIP: ABNORMAL
MICROSCOPIC COMMENT: ABNORMAL
NITRITE UR QL STRIP: NEGATIVE
PH UR STRIP: 6 [PH] (ref 5–8)
PROT UR QL STRIP: NEGATIVE
RBC #/AREA URNS HPF: 2 /HPF (ref 0–4)
SP GR UR STRIP: <=1.005 (ref 1–1.03)
URN SPEC COLLECT METH UR: ABNORMAL
WBC #/AREA URNS HPF: 25 /HPF (ref 0–5)

## 2017-10-23 PROCEDURE — 81000 URINALYSIS NONAUTO W/SCOPE: CPT | Mod: PO

## 2017-10-23 PROCEDURE — 99213 OFFICE O/P EST LOW 20 MIN: CPT | Mod: S$PBB,,, | Performed by: PHYSICIAN ASSISTANT

## 2017-10-23 PROCEDURE — 99999 PR PBB SHADOW E&M-EST. PATIENT-LVL IV: CPT | Mod: PBBFAC,,, | Performed by: PHYSICIAN ASSISTANT

## 2017-10-23 PROCEDURE — 99214 OFFICE O/P EST MOD 30 MIN: CPT | Mod: PBBFAC,PO | Performed by: PHYSICIAN ASSISTANT

## 2017-10-23 PROCEDURE — 87086 URINE CULTURE/COLONY COUNT: CPT

## 2017-10-23 RX ORDER — PHENAZOPYRIDINE HYDROCHLORIDE 200 MG/1
200 TABLET, FILM COATED ORAL 3 TIMES DAILY PRN
Qty: 10 TABLET | Refills: 0 | Status: SHIPPED | OUTPATIENT
Start: 2017-10-23 | End: 2017-10-26

## 2017-10-23 RX ORDER — CIPROFLOXACIN 500 MG/1
500 TABLET ORAL 2 TIMES DAILY
Qty: 6 TABLET | Refills: 0 | Status: SHIPPED | OUTPATIENT
Start: 2017-10-23 | End: 2018-03-14

## 2017-10-23 NOTE — PROGRESS NOTES
Subjective:       Patient ID: Sandhya Madrigal is a 71 y.o. female.    Chief Complaint: Urinary Frequency    HPI   UTI sx x 1 day  Last UTI 2 or 3 yrs ago  Review of Systems   Constitutional: Positive for activity change. Negative for appetite change, chills, diaphoresis, fatigue, fever and unexpected weight change.   HENT: Negative.    Eyes: Negative.    Respiratory: Negative.    Cardiovascular: Negative.    Gastrointestinal: Negative.    Endocrine: Negative.    Genitourinary: Positive for dysuria, frequency and urgency. Negative for flank pain and hematuria.   Musculoskeletal: Negative.    Skin: Negative.  Negative for rash.   Neurological: Negative.        Objective:      Physical Exam   Constitutional: She appears well-developed and well-nourished. No distress.   HENT:   Head: Normocephalic and atraumatic.   Eyes: Conjunctivae are normal. No scleral icterus.   Neck: Normal range of motion. Neck supple. No tracheal deviation present. No thyromegaly present.   Abdominal: Soft. She exhibits no distension and no mass. There is no tenderness. There is no rebound and no guarding.   Musculoskeletal: She exhibits no edema.   Lymphadenopathy:     She has no cervical adenopathy.   Skin: Skin is warm and dry. No rash noted.   Vitals reviewed.      Assessment:       1. Urinary frequency    2. Painful urination    3. Urinary tract infection with hematuria, site unspecified        Plan:       Sandhya was seen today for urinary frequency.    Diagnoses and all orders for this visit:    Urinary frequency  -     URINALYSIS  -     Urine culture  -     ciprofloxacin HCl (CIPRO) 500 MG tablet; Take 1 tablet (500 mg total) by mouth 2 (two) times daily.  -     phenazopyridine (PYRIDIUM) 200 MG tablet; Take 1 tablet (200 mg total) by mouth 3 (three) times daily as needed for Pain.    Painful urination  -     URINALYSIS  -     Urine culture  -     ciprofloxacin HCl (CIPRO) 500 MG tablet; Take 1 tablet (500 mg total) by mouth 2 (two) times  daily.  -     phenazopyridine (PYRIDIUM) 200 MG tablet; Take 1 tablet (200 mg total) by mouth 3 (three) times daily as needed for Pain.    Urinary tract infection with hematuria, site unspecified  -     ciprofloxacin HCl (CIPRO) 500 MG tablet; Take 1 tablet (500 mg total) by mouth 2 (two) times daily.  -     phenazopyridine (PYRIDIUM) 200 MG tablet; Take 1 tablet (200 mg total) by mouth 3 (three) times daily as needed for Pain.    Other orders  -     Urinalysis Microscopic    discussed otc's

## 2017-10-25 LAB — BACTERIA UR CULT: NORMAL

## 2017-12-14 ENCOUNTER — IMMUNIZATION (OUTPATIENT)
Dept: FAMILY MEDICINE | Facility: CLINIC | Age: 72
End: 2017-12-14
Payer: MEDICARE

## 2017-12-14 PROCEDURE — G0008 ADMIN INFLUENZA VIRUS VAC: HCPCS | Mod: PBBFAC,PO

## 2018-01-24 ENCOUNTER — PATIENT MESSAGE (OUTPATIENT)
Dept: INTERNAL MEDICINE | Facility: CLINIC | Age: 73
End: 2018-01-24

## 2018-01-25 ENCOUNTER — PATIENT MESSAGE (OUTPATIENT)
Dept: INTERNAL MEDICINE | Facility: CLINIC | Age: 73
End: 2018-01-25

## 2018-01-29 ENCOUNTER — TELEPHONE (OUTPATIENT)
Dept: FAMILY MEDICINE | Facility: CLINIC | Age: 73
End: 2018-01-29

## 2018-01-29 DIAGNOSIS — Z00.00 HEALTH CARE MAINTENANCE: ICD-10-CM

## 2018-01-29 DIAGNOSIS — Z13.6 ENCOUNTER FOR SCREENING FOR CARDIOVASCULAR DISORDERS: ICD-10-CM

## 2018-01-29 DIAGNOSIS — E03.9 HYPOTHYROIDISM, UNSPECIFIED TYPE: Primary | ICD-10-CM

## 2018-01-29 NOTE — TELEPHONE ENCOUNTER
Spoke to pt and she would like labs entered for her upcoming appt and would like to make sure it includes tsh and or t3 and t4 for her thyroid medication. Please advise. Thanks .

## 2018-01-29 NOTE — TELEPHONE ENCOUNTER
----- Message from Nurys Veloz sent at 1/29/2018  2:36 PM CST -----  Contact: [phone]  Patient called requesting orders to have labs done.    Please call upon patient request at [phone].

## 2018-03-11 ENCOUNTER — TELEPHONE (OUTPATIENT)
Dept: INTERNAL MEDICINE | Facility: CLINIC | Age: 73
End: 2018-03-11

## 2018-03-11 NOTE — PROGRESS NOTES
HISTORY OF PRESENT ILLNESS:  Pt. is a 72 y.o. female presents for monitoring of her mild renal insufficiency, hypothyroidism,osteopenia.  I have not seen her in a year.  She has had recent labs.  TSH is WNL on current meds, HDL is greater than 60, , CBC is WNL, CMP only shows a very minimal decrease in GFR, has improved from previous.  She has been exercising.  She saw Dr. Ivan, has cataracts.  Is on eye drops for dry eyes.      Health Maintenance Topics with due status: Not Due       Topic Last Completion Date    DEXA SCAN 03/13/2017    Colonoscopy 05/12/2017    Mammogram 06/14/2017    Lipid Panel 03/12/2018     Health Maintenance Due   Topic Date Due    TETANUS VACCINE  06/27/2017/Rx given today;       Lab Results   Component Value Date    WBC 6.38 03/12/2018    HGB 13.1 03/12/2018    HCT 41.6 03/12/2018     03/12/2018    CHOL 200 (H) 03/12/2018    TRIG 87 03/12/2018    HDL 63 03/12/2018    ALT 15 03/12/2018    AST 17 03/12/2018     03/12/2018    K 4.6 03/12/2018     03/12/2018    CREATININE 1.0 03/12/2018    BUN 21 03/12/2018    CO2 25 03/12/2018    TSH 2.213 03/12/2018         Past Medical History:   Diagnosis Date    Allergy     Squamous cell carcinoma, arm     right    Thyroid disease        Past Surgical History:   Procedure Laterality Date    BREAST BIOPSY Left 2015    benign bx    COLONOSCOPY  ~2006    ([place])   Normal. by her hx.    COLONOSCOPY N/A 5/12/2017    Procedure: COLONOSCOPY;  Surgeon: Andrea Jacobs Jr., MD;  Location: [facility];  Service: Endoscopy;  Laterality: N/A;    DENTAL SURGERY      4 dental implants       Social History     Social History    Marital status: Single     Spouse name: N/A    Number of children: 0    Years of education: N/A     Social History Main Topics    Smoking status: Never Smoker    Smokeless tobacco: Never Used    Alcohol use 0.0 oz/week      Comment: 1 beer a night    Drug use: No    Sexual activity: Yes     Partners:  Male     Other Topics Concern    None     Social History Narrative    None       ROS:  GENERAL: No fever, chills, fatigability or weight loss.  SKIN: No rashes, itching or changes in color or texture of skin.  HEAD: No headaches or recent head trauma.  EARS: Denies ear pain, discharge or vertigo.  NOSE: No loss of smell, no epistaxis or postnasal drip.  MOUTH & THROAT: No hoarseness or change in voice. No excessive gum bleeding.  NODES: Denies swollen glands.  CHEST: Denies NELSON, cyanosis, wheezing, cough and sputum production.  CARDIOVASCULAR: Denies chest pain, PND, orthopnea or reduced exercise tolerance.  ABDOMEN: Appetite fine. No weight loss. Denies constipation, diarrhea, abdominal pain, hematemesis or blood in stool.  URINARY: No flank pain, dysuria or hematuria.  PERIPHERAL VASCULAR: No claudication or cyanosis. No edema.  MUSCULOSKELETAL: No joint stiffness or swelling. Denies back pain.  NEUROLOGIC: Denies numbness    PE:   Vitals:   Vitals:    03/14/18 1113   BP: 128/74   Pulse: 68   Resp: 18   Temp: 98 °F (36.7 °C)     GENERAL: no acute distress, A&Ox3, comfortable.  Female with BMI of 21   HEENT: tympanic membranes clear, nasal mucosa pink, no pharyngeal erythema or exudate  NECK: supple, no cervical lymphadenopathy, no thyromegaly; no supraclavicular nodes;   CHEST:  clear to auscultation bilaterally, no crackles or wheeze; no increased work of breathing;  CARDIOVASCULAR: regular rate and rhythm, no rubs, murmurs or gallops.  ABDOMEN: normal bowel sounds, soft non-tender, non-distended; no palpable organomegaly;   EXT: no clubbing, cyanosis or edema; positive varicosities;    ASSESSMENT/PLAN:    Varicose veins of both lower extremities  -     Ambulatory Referral to Vascular Surgery    Hypothyroidism (acquired)  -     levothyroxine (SYNTHROID) 75 MCG tablet; Take 1 tablet (75 mcg total) by mouth before breakfast. LEVOTHYROXINE SODIUM 75 MCG TABS  Dispense: 90 tablet; Refill: 3    History of skin  cancer  -     Ambulatory Referral to Dermatology    Health care maintenance: Rx given to pt;        Medication List with Changes/Refills   Current Medications    CARBOXYMETHYLCELLULOSE (REFRESH PLUS) 0.5 % DPET    1 drop 3 (three) times daily as needed.    CETIRIZINE (ZYRTEC) 10 MG TABLET    CETIRIZINE HCL 10 MG TABS    FLUTICASONE (FLONASE) 50 MCG/ACTUATION NASAL SPRAY    2 sprays by Each Nare route once daily. FLUTICASONE PROPIONATE 50 MCG/ACT SUSP    KETOTIFEN (ZADITOR) 0.025 % (0.035 %) OPHTHALMIC SOLUTION    Place 1 drop into both eyes 2 (two) times daily as needed.    MULTIVIT-MIN/IRON/FOLIC/LUTEIN (CENTRUM SILVER WOMEN ORAL)    Take 1 tablet by mouth once daily.    VIT C,A-HV-DFJWT-LUTEIN-ZEAXAN (PRESERVISION AREDS 2) [phone] MG-UNIT-MG-MG CAP    Take 2 capsules by mouth once daily.   Changed and/or Refilled Medications    Modified Medication Previous Medication    LEVOTHYROXINE (SYNTHROID) 75 MCG TABLET levothyroxine (SYNTHROID) 75 MCG tablet       Take 1 tablet (75 mcg total) by mouth before breakfast. LEVOTHYROXINE SODIUM 75 MCG TABS    Take 1 tablet (75 mcg total) by mouth before breakfast. LEVOTHYROXINE SODIUM 75 MCG TABS   Discontinued Medications    CALCIUM CARBONATE/VITAMIN D3 (VITAMIN D-3 ORAL)    Take by mouth.    CIPROFLOXACIN HCL (CIPRO) 500 MG TABLET    Take 1 tablet (500 mg total) by mouth 2 (two) times daily.     Call if condition changes or worsens.

## 2018-03-12 ENCOUNTER — LAB VISIT (OUTPATIENT)
Dept: LAB | Facility: HOSPITAL | Age: 73
End: 2018-03-12
Attending: INTERNAL MEDICINE
Payer: MEDICARE

## 2018-03-12 DIAGNOSIS — Z00.00 HEALTH CARE MAINTENANCE: ICD-10-CM

## 2018-03-12 DIAGNOSIS — Z13.6 ENCOUNTER FOR SCREENING FOR CARDIOVASCULAR DISORDERS: ICD-10-CM

## 2018-03-12 DIAGNOSIS — E03.9 HYPOTHYROIDISM, UNSPECIFIED TYPE: ICD-10-CM

## 2018-03-12 LAB
ALBUMIN SERPL BCP-MCNC: 3.6 G/DL
ALP SERPL-CCNC: 70 U/L
ALT SERPL W/O P-5'-P-CCNC: 15 U/L
ANION GAP SERPL CALC-SCNC: 10 MMOL/L
AST SERPL-CCNC: 17 U/L
BASOPHILS # BLD AUTO: 0.07 K/UL
BASOPHILS NFR BLD: 1.1 %
BILIRUB SERPL-MCNC: 0.4 MG/DL
BUN SERPL-MCNC: 21 MG/DL
CALCIUM SERPL-MCNC: 9.6 MG/DL
CHLORIDE SERPL-SCNC: 110 MMOL/L
CHOLEST SERPL-MCNC: 200 MG/DL
CHOLEST/HDLC SERPL: 3.2 {RATIO}
CO2 SERPL-SCNC: 25 MMOL/L
CREAT SERPL-MCNC: 1 MG/DL
DIFFERENTIAL METHOD: ABNORMAL
EOSINOPHIL # BLD AUTO: 0.5 K/UL
EOSINOPHIL NFR BLD: 8 %
ERYTHROCYTE [DISTWIDTH] IN BLOOD BY AUTOMATED COUNT: 14.9 %
EST. GFR  (AFRICAN AMERICAN): >60 ML/MIN/1.73 M^2
EST. GFR  (NON AFRICAN AMERICAN): 56.8 ML/MIN/1.73 M^2
GLUCOSE SERPL-MCNC: 93 MG/DL
HCT VFR BLD AUTO: 41.6 %
HDLC SERPL-MCNC: 63 MG/DL
HDLC SERPL: 31.5 %
HGB BLD-MCNC: 13.1 G/DL
IMM GRANULOCYTES # BLD AUTO: 0.01 K/UL
IMM GRANULOCYTES NFR BLD AUTO: 0.2 %
LDLC SERPL CALC-MCNC: 119.6 MG/DL
LYMPHOCYTES # BLD AUTO: 3 K/UL
LYMPHOCYTES NFR BLD: 46.2 %
MCH RBC QN AUTO: 27.6 PG
MCHC RBC AUTO-ENTMCNC: 31.5 G/DL
MCV RBC AUTO: 88 FL
MONOCYTES # BLD AUTO: 0.4 K/UL
MONOCYTES NFR BLD: 6.1 %
NEUTROPHILS # BLD AUTO: 2.5 K/UL
NEUTROPHILS NFR BLD: 38.4 %
NONHDLC SERPL-MCNC: 137 MG/DL
NRBC BLD-RTO: 0 /100 WBC
PLATELET # BLD AUTO: 220 K/UL
PMV BLD AUTO: 10.2 FL
POTASSIUM SERPL-SCNC: 4.6 MMOL/L
PROT SERPL-MCNC: 6.7 G/DL
RBC # BLD AUTO: 4.74 M/UL
SODIUM SERPL-SCNC: 145 MMOL/L
TRIGL SERPL-MCNC: 87 MG/DL
TSH SERPL DL<=0.005 MIU/L-ACNC: 2.21 UIU/ML
WBC # BLD AUTO: 6.38 K/UL

## 2018-03-12 PROCEDURE — 80053 COMPREHEN METABOLIC PANEL: CPT

## 2018-03-12 PROCEDURE — 36415 COLL VENOUS BLD VENIPUNCTURE: CPT | Mod: PO

## 2018-03-12 PROCEDURE — 84443 ASSAY THYROID STIM HORMONE: CPT

## 2018-03-12 PROCEDURE — 80061 LIPID PANEL: CPT

## 2018-03-12 PROCEDURE — 85025 COMPLETE CBC W/AUTO DIFF WBC: CPT

## 2018-03-14 ENCOUNTER — OFFICE VISIT (OUTPATIENT)
Dept: INTERNAL MEDICINE | Facility: CLINIC | Age: 73
End: 2018-03-14
Payer: MEDICARE

## 2018-03-14 ENCOUNTER — TELEPHONE (OUTPATIENT)
Dept: VASCULAR SURGERY | Facility: CLINIC | Age: 73
End: 2018-03-14

## 2018-03-14 VITALS
DIASTOLIC BLOOD PRESSURE: 74 MMHG | BODY MASS INDEX: 22.04 KG/M2 | TEMPERATURE: 98 F | OXYGEN SATURATION: 98 % | WEIGHT: 140.44 LBS | RESPIRATION RATE: 18 BRPM | HEART RATE: 68 BPM | SYSTOLIC BLOOD PRESSURE: 128 MMHG | HEIGHT: 67 IN

## 2018-03-14 DIAGNOSIS — Z85.828 HISTORY OF SKIN CANCER: ICD-10-CM

## 2018-03-14 DIAGNOSIS — I83.93 VARICOSE VEINS OF BOTH LOWER EXTREMITIES: Primary | ICD-10-CM

## 2018-03-14 DIAGNOSIS — R60.0 LOCALIZED EDEMA: Primary | ICD-10-CM

## 2018-03-14 DIAGNOSIS — E03.9 HYPOTHYROIDISM (ACQUIRED): ICD-10-CM

## 2018-03-14 DIAGNOSIS — Z00.00 HEALTH CARE MAINTENANCE: ICD-10-CM

## 2018-03-14 PROCEDURE — G0439 PPPS, SUBSEQ VISIT: HCPCS | Mod: ,,, | Performed by: INTERNAL MEDICINE

## 2018-03-14 PROCEDURE — 99214 OFFICE O/P EST MOD 30 MIN: CPT | Mod: PBBFAC,PO | Performed by: INTERNAL MEDICINE

## 2018-03-14 PROCEDURE — 99999 PR PBB SHADOW E&M-EST. PATIENT-LVL IV: CPT | Mod: PBBFAC,,, | Performed by: INTERNAL MEDICINE

## 2018-03-14 RX ORDER — KETOTIFEN FUMARATE 0.35 MG/ML
1 SOLUTION/ DROPS OPHTHALMIC 2 TIMES DAILY PRN
COMMUNITY
End: 2021-02-23

## 2018-03-14 RX ORDER — LEVOTHYROXINE SODIUM 75 UG/1
75 TABLET ORAL
Qty: 90 TABLET | Refills: 3 | Status: SHIPPED | OUTPATIENT
Start: 2018-03-14 | End: 2019-01-29 | Stop reason: SDUPTHER

## 2018-03-14 NOTE — TELEPHONE ENCOUNTER
----- Message from Richa Phelps MA sent at 3/14/2018 11:48 AM CDT -----  Contact: temi   Needs to schedule varicose vein consult   Call back

## 2018-03-14 NOTE — TELEPHONE ENCOUNTER
Referred by Dr Liao for veins.  Informed of Dr Martinez's guidelines for venous insufficiency.  Ultrasound of deep and superficial veins BLE ordered and scheduled per WOGS on 4/16, and consultation scheduled with Dr Martinez on 4/19, all appts  @ the [facility].  Voices understanding, will call back as needed.

## 2018-03-16 DIAGNOSIS — J30.9 ALLERGIC RHINITIS, UNSPECIFIED CHRONICITY, UNSPECIFIED SEASONALITY, UNSPECIFIED TRIGGER: Primary | ICD-10-CM

## 2018-03-16 NOTE — TELEPHONE ENCOUNTER
----- Message from Erin Rodarte sent at 3/16/2018  1:45 PM CDT -----  Contact: self   Patient need a refill on rx fluticasone (FLONASE) 50 mcg/actuation nasal spray. Please send to [facility]. Please call back at [phone] (home)        [facility] [address], [place] - [address] [facility]  [address] [facility] [address]  Phone: [phone] Fax: [phone]

## 2018-03-16 NOTE — TELEPHONE ENCOUNTER
Patient called requesting refill on medication to preferred pharmacy. Last Office Visit 3/14/18. Please advise.

## 2018-03-18 RX ORDER — FLUTICASONE PROPIONATE 50 MCG
2 SPRAY, SUSPENSION (ML) NASAL DAILY
Qty: 1 BOTTLE | Refills: 3 | Status: SHIPPED | OUTPATIENT
Start: 2018-03-18 | End: 2019-08-26 | Stop reason: SDUPTHER

## 2018-03-19 NOTE — TELEPHONE ENCOUNTER
Notified pt of RX sent to [facility] and received yesterday. Pt voiced understanding. CLC

## 2018-03-19 NOTE — TELEPHONE ENCOUNTER
----- Message from Laura Echavarria sent at 3/19/2018  1:37 PM CDT -----  Contact: pt [phone]  Patient called and asked for a Fluticasone nose spray she called last week for the refill to be called into [facility] pharmacy. Patient is asking for a call back.

## 2018-04-16 ENCOUNTER — HOSPITAL ENCOUNTER (OUTPATIENT)
Dept: RADIOLOGY | Facility: HOSPITAL | Age: 73
Discharge: HOME | End: 2018-04-16
Attending: THORACIC SURGERY (CARDIOTHORACIC VASCULAR SURGERY)
Payer: MEDICARE

## 2018-04-16 DIAGNOSIS — R60.0 LOCALIZED EDEMA: ICD-10-CM

## 2018-04-16 PROCEDURE — 93970 EXTREMITY STUDY: CPT | Mod: TC,PO

## 2018-04-16 PROCEDURE — 93970 EXTREMITY STUDY: CPT | Mod: 26,,, | Performed by: RADIOLOGY

## 2018-04-17 ENCOUNTER — OFFICE VISIT (OUTPATIENT)
Dept: PODIATRY | Facility: CLINIC | Age: 73
End: 2018-04-17
Payer: MEDICARE

## 2018-04-17 ENCOUNTER — HOSPITAL ENCOUNTER (OUTPATIENT)
Dept: RADIOLOGY | Facility: HOSPITAL | Age: 73
Discharge: HOME | End: 2018-04-17
Attending: PODIATRIST
Payer: MEDICARE

## 2018-04-17 VITALS — WEIGHT: 143.31 LBS | BODY MASS INDEX: 22.49 KG/M2 | HEIGHT: 67 IN

## 2018-04-17 DIAGNOSIS — M20.11 HALLUX VALGUS OF RIGHT FOOT: Primary | ICD-10-CM

## 2018-04-17 DIAGNOSIS — M20.11 HALLUX VALGUS OF RIGHT FOOT: ICD-10-CM

## 2018-04-17 PROCEDURE — 99999 PR PBB SHADOW E&M-EST. PATIENT-LVL III: CPT | Mod: PBBFAC,,, | Performed by: PODIATRIST

## 2018-04-17 PROCEDURE — 73630 X-RAY EXAM OF FOOT: CPT | Mod: 26,RT,, | Performed by: RADIOLOGY

## 2018-04-17 PROCEDURE — 99213 OFFICE O/P EST LOW 20 MIN: CPT | Mod: PBBFAC,PN | Performed by: PODIATRIST

## 2018-04-17 PROCEDURE — 73630 X-RAY EXAM OF FOOT: CPT | Mod: TC,PO,RT

## 2018-04-17 PROCEDURE — 99213 OFFICE O/P EST LOW 20 MIN: CPT | Mod: S$PBB,,, | Performed by: PODIATRIST

## 2018-04-17 NOTE — PROGRESS NOTES
Subjective:      Patient ID: Sandhya Madrigal is a 72 y.o. female.    Chief Complaint: Bunions (right foot only )    Bunion with bump and crooked toe right foot gradually increasing past year or so.  Gradual onset, worsening over past several lmonths, aggravated by increased weight bearing, shoe gear, pressure.  No previous medical treatment.  OTC pain med not helping.  Denies trauma and surgery right foot.  xrays right foot 2017 show hallux valgus without injury acutely.    Review of Systems   Constitution: Negative for chills, diaphoresis, fever, malaise/fatigue and night sweats.   Cardiovascular: Negative for claudication, cyanosis, leg swelling and syncope.   Skin: Negative for color change, dry skin, nail changes, rash, suspicious lesions and unusual hair distribution.   Musculoskeletal: Positive for joint pain (discomfort, not pain right 1st mtpj). Negative for falls, joint swelling, muscle cramps, muscle weakness and stiffness.   Gastrointestinal: Negative for constipation, diarrhea, nausea and vomiting.   Neurological: Negative for brief paralysis, disturbances in coordination, focal weakness, numbness, paresthesias, sensory change and tremors.           Objective:      Physical Exam   Constitutional: She appears well-developed and well-nourished. She is cooperative. No distress.   Cardiovascular:   Pulses:       Popliteal pulses are 2+ on the right side, and 2+ on the left side.        Dorsalis pedis pulses are 2+ on the right side, and 2+ on the left side.        Posterior tibial pulses are 2+ on the right side, and 2+ on the left side.   Capillary refill 3 seconds all toes/distal feet, all toes/both feet warm to touch.      Negative lymphadenopathy bilateral popliteal fossa and tarsal tunnel.      Negavie lower extremity edema bilateral.     Musculoskeletal:        Right ankle: Normal. She exhibits normal range of motion, no swelling, no ecchymosis, no deformity, no laceration and normal pulse. Achilles  tendon normal. Achilles tendon exhibits no pain, no defect and normal Correa's test results.   Visible and palpable bunion without pain at dorsomedial 1st metatarsal head right.  Hallux abducted right partially reducible, tracks laterally without being track bound.  No ecchymosis, erythema, edema, or cardinal signs infection or signs of trauma same foot.    Otherwise, Normal angle, base, station of gait. All ten toes without clubbing, cyanosis, or signs of ischemia.  No pain to palpation bilateral lower extremities.  Range of motion, stability, muscle strength, and muscle tone normal bilateral feet and legs.     Lymphadenopathy:   Negative lymphadenopathy bilateral popliteal fossa and tarsal tunnel.   Neurological: She is alert. She has normal strength. She displays no atrophy and no tremor. No sensory deficit. She exhibits normal muscle tone. She displays no seizure activity. Gait normal.   Reflex Scores:       Patellar reflexes are 2+ on the right side and 2+ on the left side.       Achilles reflexes are 2+ on the right side and 2+ on the left side.  Negative tinel sign to percussion sural, superficial peroneal, deep peroneal, saphenous, and posterior tibial nerves right and left ankles and feet.     Skin: Skin is warm, dry and intact. No abrasion, no bruising, no burn, no ecchymosis, no laceration, no lesion and no rash noted. She is not diaphoretic. No cyanosis or erythema. No pallor. Nails show no clubbing.     Skin is normal age and health appropriate color, turgor, texture, and temperature bilateral lower extremities without ulceration, hyperpigmentation, discoloration, masses nodules or cords palpated.  No ecchymosis, erythema, edema, or cardinal signs of infection bilateral lower extremities.               Assessment:       Encounter Diagnosis   Name Primary?    Hallux valgus of right foot Yes         Plan:       Sandhya was seen today for bunions.    Diagnoses and all orders for this visit:    Hallux  valgus of right foot  -     X-Ray Foot Complete Right; Future      I counseled the patient on her conditions, their implications and medical management.        Xrays.    Patient will stretch the tendo achilles complex three times daily as demonstrated in the office.  Literature was dispensed illustrating proper stretching technique.    Patient will obtain over the counter arch supports and wear them in shoes whenever possible.  Athletic shoes intended for walking or running are usually best.    Declines PT, custom orthotics, injection, nsaid    Declines scheduled follow up.          Follow-up if symptoms worsen or fail to improve.

## 2018-04-25 ENCOUNTER — INITIAL CONSULT (OUTPATIENT)
Dept: VASCULAR SURGERY | Facility: CLINIC | Age: 73
End: 2018-04-25
Payer: MEDICARE

## 2018-04-25 VITALS
SYSTOLIC BLOOD PRESSURE: 144 MMHG | DIASTOLIC BLOOD PRESSURE: 84 MMHG | WEIGHT: 142 LBS | HEIGHT: 67 IN | HEART RATE: 53 BPM | BODY MASS INDEX: 22.29 KG/M2

## 2018-04-25 DIAGNOSIS — I87.2 VENOUS INSUFFICIENCY OF RIGHT LOWER EXTREMITY: ICD-10-CM

## 2018-04-25 DIAGNOSIS — I83.813 VARICOSE VEINS OF BOTH LOWER EXTREMITIES WITH PAIN: Primary | ICD-10-CM

## 2018-04-25 PROCEDURE — 99213 OFFICE O/P EST LOW 20 MIN: CPT | Mod: PBBFAC,PO | Performed by: THORACIC SURGERY (CARDIOTHORACIC VASCULAR SURGERY)

## 2018-04-25 PROCEDURE — 99999 PR PBB SHADOW E&M-EST. PATIENT-LVL III: CPT | Mod: PBBFAC,,, | Performed by: THORACIC SURGERY (CARDIOTHORACIC VASCULAR SURGERY)

## 2018-04-25 PROCEDURE — 99205 OFFICE O/P NEW HI 60 MIN: CPT | Mod: S$PBB,,, | Performed by: THORACIC SURGERY (CARDIOTHORACIC VASCULAR SURGERY)

## 2018-04-25 NOTE — PROGRESS NOTES
OFFICE VISIT NOTE    I was asked to see this patient by Dr. Liao.  The patient is a 72-year-old   female who has some painful varicose veins on bilateral lower extremities that   she has had for several years.  She has worn compression stockings in the past   without much help.  She denies any significant edema.  An ultrasound showed   venous insufficiency of the right lesser saphenous vein.    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    PAST SURGICAL HISTORY:  Colonoscopy, dental implant surgery, breast biopsy.    ALLERGIES:  Sulfa, penicillin, rifampin.    MEDICATIONS:  Zyrtec, Flonase, Synthroid.    FAMILY HISTORY:  Breast cancer, melanoma, alcohol abuse.    SOCIAL HISTORY:  The patient does not smoke and never smoked cigarettes.  She   drinks alcohol occasionally.    REVIEW OF SYSTEMS:  She complains of painful varicose veins of bilateral lower   extremities.  All other systems are reviewed and are negative.    PHYSICAL EXAMINATION:  VITAL SIGNS:  Blood pressure is 144/84, respiratory rate is 18, heart rate 53,   height 5 feet 7 inches, weight 142 pounds.  GENERAL:  She is awake and alert, in no apparent distress.  HEENT:  Normocephalic.  Pupils are equal, round, and reactive.  Sclerae are   anicteric.  NECK:  Supple.  Trachea midline.  No masses.  LUNGS:  Clear.  HEART:  Has a regular rate and rhythm.  ABDOMEN:  Soft and nontender.  No masses.  Bowel sounds are present.  EXTREMITIES:  She has varicose and reticular veins of bilateral lower   extremities.  She has trace edema of bilateral lower extremities.  Bilateral   feet are pink and warm with good capillary refill.  NEUROLOGIC:  Awake, alert and oriented.  No lateralizing neurologic deficits.    Ultrasound of the veins of the lower extremities showed venous insufficiency of   the right lesser saphenous vein with reflux time of 7564  milliseconds.    IMPRESSION:  1.  Venous insufficiency of the right lesser saphenous vein.  2.  Varicose veins with pain of bilateral lower extremities.    RECOMMENDATIONS:  The patient has venous insufficiency of the right lesser   saphenous vein with very high reflux times.  However, she does not have much   edema and her painful varicose veins are in her thighs.  We talked about   endovenous laser ablation of the right lesser saphenous vein, which is 4 mm in   diameter.  I recommended not doing the endovenous laser ablation at this time,   and to continue wearing compression stockings and elevate the legs as much as   possible.  I did recommend that we proceed with Veinlite-guided sclerotherapy of   her painful varicose veins.  Risks including hyperpigmentation, ulceration, and   DVT, amongst others were discussed.  She voiced understanding and wishes to   proceed.      DARIAN  dd: 04/25/2018 14:28:37 (CDT)  td: 04/26/2018 00:23:18 (CDT)  Doc ID   #[id number]  Job ID #[id number]    CC:

## 2018-04-25 NOTE — LETTER
April 25, 2018      Nithya Liao MD  [address] [facility] [address] [place] [address]           [facility]  [address] [facility] [address] [place] [address]  Phone: [phone]          Patient: Sandhya Madrigal   MR Number: [id number]   YOB: 1945   Date of Visit: 4/25/2018       Dear Dr. Nithya Liao:    Thank you for referring Sandhya Madrigal to me for evaluation. Attached you will find relevant portions of my assessment and plan of care.    If you have questions, please do not hesitate to call me. I look forward to following Sandhya Madrigal along with you.    Sincerely,    Susannah Martinez MD    Enclosure  CC:  No Recipients    If you would like to receive this communication electronically, please contact [email or web address] or [phone] to request more information on [employer] Link access.    For providers and/or their staff who would like to refer a patient to [facility], please contact us through our one-stop-shop provider referral line, [facility], at [phone].    If you feel you have received this communication in error or would no longer like to receive these types of communications, please e-mail [email or web address]

## 2018-05-22 ENCOUNTER — OFFICE VISIT (OUTPATIENT)
Dept: FAMILY MEDICINE | Facility: CLINIC | Age: 73
End: 2018-05-22
Payer: MEDICARE

## 2018-05-22 VITALS
DIASTOLIC BLOOD PRESSURE: 76 MMHG | BODY MASS INDEX: 22.25 KG/M2 | HEIGHT: 67 IN | TEMPERATURE: 98 F | OXYGEN SATURATION: 96 % | HEART RATE: 45 BPM | SYSTOLIC BLOOD PRESSURE: 142 MMHG | WEIGHT: 141.75 LBS

## 2018-05-22 DIAGNOSIS — T63.464A WASP STING, UNDETERMINED INTENT, INITIAL ENCOUNTER: Primary | ICD-10-CM

## 2018-05-22 DIAGNOSIS — L03.113 CELLULITIS OF RIGHT HAND: ICD-10-CM

## 2018-05-22 PROCEDURE — 99213 OFFICE O/P EST LOW 20 MIN: CPT | Mod: S$PBB,,, | Performed by: NURSE PRACTITIONER

## 2018-05-22 PROCEDURE — 99214 OFFICE O/P EST MOD 30 MIN: CPT | Mod: PBBFAC,PO | Performed by: NURSE PRACTITIONER

## 2018-05-22 PROCEDURE — 99999 PR PBB SHADOW E&M-EST. PATIENT-LVL IV: CPT | Mod: PBBFAC,,, | Performed by: NURSE PRACTITIONER

## 2018-05-22 RX ORDER — CLOBETASOL PROPIONATE 0.5 MG/G
CREAM TOPICAL EVERY 4 HOURS PRN
Qty: 60 G | Refills: 0 | Status: SHIPPED | OUTPATIENT
Start: 2018-05-22 | End: 2019-03-20

## 2018-05-22 RX ORDER — DOXYCYCLINE 100 MG/1
100 CAPSULE ORAL EVERY 12 HOURS
Qty: 20 CAPSULE | Refills: 0 | Status: SHIPPED | OUTPATIENT
Start: 2018-05-22 | End: 2018-06-01

## 2018-05-22 RX ORDER — PREDNISONE 10 MG/1
TABLET ORAL
Qty: 12 TABLET | Refills: 0 | Status: SHIPPED | OUTPATIENT
Start: 2018-05-22 | End: 2019-01-18

## 2018-05-22 RX ORDER — CETIRIZINE HYDROCHLORIDE 10 MG/1
10 TABLET ORAL DAILY
Qty: 30 TABLET | Refills: 0 | Status: CANCELLED | COMMUNITY
Start: 2018-05-22 | End: 2018-06-21

## 2018-05-22 NOTE — PROGRESS NOTES
Subjective:       Patient ID: Sandhya Madrigal is a 72 y.o. female.  First time seeing pt in clinic.  Last seen by Dr Liao, PCP on 3/14/2018.     Chief Complaint: Insect Bite (whasp bite on Sun)  Pt reports she was stung by a wasp on Sunday and her right hand is red and swollen. Denies fever.   Insect Bite   This is a new (to right hand) problem. The current episode started in the past 7 days (Sunday late afternoon). The problem occurs constantly. The problem has been gradually worsening. Pertinent negatives include no chest pain, chills, coughing, diaphoresis, fever, sore throat or swollen glands. Treatments tried: cortisone cream.     Vitals:    05/22/18 0856   BP: (!) 142/76   Pulse: (!) 45   Temp: 98.1 °F (36.7 °C)     Review of Systems   Constitutional: Negative for chills, diaphoresis and fever.   HENT: Negative for facial swelling, sore throat and trouble swallowing.    Respiratory: Negative for cough, chest tightness and shortness of breath.    Cardiovascular: Negative for chest pain.   Skin:        Pt was stung by a wasp on Sunday, her hand is now red and swollen.        Objective:      Physical Exam   Constitutional: She is oriented to person, place, and time. She appears well-developed and well-nourished.   HENT:   Head: Normocephalic and atraumatic.   Right Ear: External ear normal.   Left Ear: External ear normal.   Nose: Nose normal.   Mouth/Throat: Oropharynx is clear and moist.   Eyes: Conjunctivae and EOM are normal.   Neck: Normal range of motion.   Cardiovascular: Normal rate, regular rhythm and normal heart sounds.    Pulmonary/Chest: Effort normal and breath sounds normal. No respiratory distress.   Musculoskeletal: Normal range of motion.   Neurological: She is alert and oriented to person, place, and time.   Skin: Skin is warm and dry. Capillary refill takes less than 2 seconds. There is erythema.        Psychiatric: She has a normal mood and affect. Her behavior is normal. Judgment and thought  content normal.   Nursing note and vitals reviewed.          Assessment & Plan:       Wasp sting, undetermined intent, initial encounter  -     clobetasol (TEMOVATE) 0.05 % cream; Apply topically every 4 (four) hours as needed. until the itching subsides.  Dispense: 60 g; Refill: 0  -     doxycycline (VIBRAMYCIN) 100 MG Cap; Take 1 capsule (100 mg total) by mouth every 12 (twelve) hours.  Dispense: 20 capsule; Refill: 0  -     predniSONE (DELTASONE) 10 MG tablet; Take 3 tablets daily for 2 days then take 2 tablets daily for 2 days then 1 tablet daily for 2 days then stop.  Dispense: 12 tablet; Refill: 0    Cellulitis of right hand  -     doxycycline (VIBRAMYCIN) 100 MG Cap; Take 1 capsule (100 mg total) by mouth every 12 (twelve) hours.  Dispense: 20 capsule; Refill: 0    Continue taking Zyrtec OTC as directed.   May take ibuprofen OTC as directed for discomfort.       Follow-up if symptoms worsen or fail to improve.

## 2018-05-23 ENCOUNTER — TELEPHONE (OUTPATIENT)
Dept: VASCULAR SURGERY | Facility: CLINIC | Age: 73
End: 2018-05-23

## 2018-05-23 NOTE — TELEPHONE ENCOUNTER
----- Message from Shirley Cruz sent at 5/23/2018  4:32 PM CDT -----  Type: Needs Medical Advice    Who Called:  Patient  Best Call Back Number: [phone]  Additional Information: Patient has procedure scheduled for 6/30/18 and she just wants to make sure that office has the prior authorization. Please call to advise.

## 2018-05-28 ENCOUNTER — TELEPHONE (OUTPATIENT)
Dept: FAMILY MEDICINE | Facility: CLINIC | Age: 73
End: 2018-05-28

## 2018-05-28 NOTE — TELEPHONE ENCOUNTER
Patient advised that's she has been feeling fine for the last two days and she doesn't feel the need to complete her doxycycline 100 mg. I advised patient that she needed to complete her rx as prescribed. I understanding that she's feeling better, But if she doesn't complete the rx her infection may come back. Patient verbalized and stated that she wanted to speak with a nurse regarding this matter. I advised patient I am a nurse patient verbalized understanding and stated she wanted to speak wit Diamond Ortega about not completing her antibiotic. Please advise

## 2018-05-28 NOTE — TELEPHONE ENCOUNTER
----- Message from Erin Rodarte sent at 5/28/2018 10:37 AM CDT -----  Contact: self   Patient want to know if she can stop taking rx doxycycline (VIBRAMYCIN) 100 MG Cap, it is making her drowsy. Please call back at [phone] (home)

## 2018-05-29 NOTE — TELEPHONE ENCOUNTER
Spoke to pt on the telephone. Encouraged her to complete antibiotic.  Pt verbalized understanding.

## 2018-06-05 ENCOUNTER — INITIAL CONSULT (OUTPATIENT)
Dept: DERMATOLOGY | Facility: CLINIC | Age: 73
End: 2018-06-05
Payer: MEDICARE

## 2018-06-05 VITALS — RESPIRATION RATE: 16 BRPM | BODY MASS INDEX: 22.13 KG/M2 | WEIGHT: 141 LBS | HEIGHT: 67 IN

## 2018-06-05 DIAGNOSIS — Z87.2 HISTORY OF ACTINIC KERATOSES: ICD-10-CM

## 2018-06-05 DIAGNOSIS — L82.1 SEBORRHEIC KERATOSES: ICD-10-CM

## 2018-06-05 DIAGNOSIS — L81.4 LENTIGINES: ICD-10-CM

## 2018-06-05 DIAGNOSIS — Z12.83 SKIN CANCER SCREENING: Primary | ICD-10-CM

## 2018-06-05 PROCEDURE — 99999 PR PBB SHADOW E&M-EST. PATIENT-LVL II: CPT | Mod: PBBFAC,,, | Performed by: DERMATOLOGY

## 2018-06-05 PROCEDURE — 99213 OFFICE O/P EST LOW 20 MIN: CPT | Mod: S$PBB,,, | Performed by: DERMATOLOGY

## 2018-06-05 PROCEDURE — 99212 OFFICE O/P EST SF 10 MIN: CPT | Mod: PBBFAC,PO | Performed by: DERMATOLOGY

## 2018-06-05 NOTE — LETTER
June 5, 2018      Nithya Liao MD  [address] [facility] [address] [place] [address]           [facility]  [address] [facility] [address] [place] [address]  Phone: [phone]  Fax: [phone]          Patient: Sandhya Madrigal   MR Number: [id number]   YOB: 1945   Date of Visit: 6/5/2018       Dear Dr. Nithya Liao:    Thank you for referring Sandhya Madrigal to me for evaluation. Attached you will find relevant portions of my assessment and plan of care.    If you have questions, please do not hesitate to call me. I look forward to following Sandhya Madrigal along with you.    Sincerely,    Nga Aldrich MD    Enclosure  CC:  No Recipients    If you would like to receive this communication electronically, please contact [email or web address] or [phone] to request more information on [employer] Link access.    For providers and/or their staff who would like to refer a patient to [facility], please contact us through our one-stop-shop provider referral line, [facility], at [phone].    If you feel you have received this communication in error or would no longer like to receive these types of communications, please e-mail [email or web address]

## 2018-06-05 NOTE — PROGRESS NOTES
Subjective:       Patient ID:  Sandhya Madrigal is a 72 y.o. female who presents for   Chief Complaint   Patient presents with    Follow-up    Skin Check     Last seen 17   Skin check today   Lesion L clavicle - months ? - raised , dark in color   Lesion below R clavicle raised & dark in color  Lesion Left leg months? 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      Neg family history of skin cancer.  Mother  from throat cancer.      Past Medical History:  No date: Allergy  No date: Squamous cell carcinoma, arm      Comment: right inner arm   No date: Thyroid disease            Review of Systems   Skin: Positive for daily sunscreen use, activity-related sunscreen use and wears hat. Negative for sensitivity to antibiotic ointment and sensitivity to bandage adhesive.   Hematologic/Lymphatic: Bruises/bleeds easily.        Objective:    Physical Exam   Constitutional: She appears well-developed and well-nourished. No distress.   Neurological: She is alert and oriented to person, place, and time. She is not disoriented.   Psychiatric: She has a normal mood and affect.   Skin:   Areas Examined (abnormalities noted in diagram):   Head / Face Inspection Performed  Neck Inspection Performed  Chest / Axilla Inspection Performed  Back Inspection Performed  RUE Inspected  LUE Inspection Performed                   Diagram Legend     Erythematous scaling macule/papule c/w actinic keratosis       Vascular papule c/w angioma      Pigmented verrucoid papule/plaque c/w seborrheic keratosis      Yellow umbilicated papule c/w sebaceous hyperplasia      Irregularly shaped tan macule c/w lentigo     1-2 mm smooth white papules consistent with Milia      Movable subcutaneous cyst with punctum c/w epidermal inclusion cyst      Subcutaneous movable cyst c/w pilar cyst      Firm pink to brown papule c/w dermatofibroma       Pedunculated fleshy papule(s) c/w skin tag(s)      Evenly pigmented macule c/w junctional nevus     Mildly variegated pigmented, slightly irregular-bordered macule c/w mildly atypical nevus      Flesh colored to evenly pigmented papule c/w intradermal nevus       Pink pearly papule/plaque c/w basal cell carcinoma      Erythematous hyperkeratotic cursted plaque c/w SCC      Surgical scar with no sign of skin cancer recurrence      Open and closed comedones      Inflammatory papules and pustules      Verrucoid papule consistent consistent with wart     Erythematous eczematous patches and plaques     Dystrophic onycholytic nail with subungual debris c/w onychomycosis     Umbilicated papule    Erythematous-base heme-crusted tan verrucoid plaque consistent with inflamed seborrheic keratosis     Erythematous Silvery Scaling Plaque c/w Psoriasis     See annotation      Assessment / Plan:        Skin cancer screening  Area(s) of previous NMSC evaluated with no signs of recurrence.    Upper body skin examination performed today including at least 6 points as noted in physical examination. No lesions suspicious for malignancy noted.      Lentigines, trunk and extremities  This is a benign hyperpigmented sun induced lesion. Daily sun protection will reduce the number of new lesions. Treatment of these benign lesions are considered cosmetic.      Seborrheic keratoses, trunk and extremities  These are benign inherited growths without a malignant potential. Reassurance given to patient. No treatment is necessary.       History of actinic keratoses  Area(s) of previous NMSC/AKs evaluated with no signs of recurrence.    Upper body skin examination performed today including at least 6 points as noted in physical examination. No lesions suspicious for malignancy noted.               Follow-up in about 1 year (around 6/5/2019).

## 2018-06-07 NOTE — TELEPHONE ENCOUNTER
Patient states she is now having pain in the right leg, she feels it is from the reflux of the rt lesser saphenous vein, and now wants to discuss doing ablation prior to sclerotherapy.  Appt scheduled.

## 2018-06-13 ENCOUNTER — OFFICE VISIT (OUTPATIENT)
Dept: VASCULAR SURGERY | Facility: CLINIC | Age: 73
End: 2018-06-13
Payer: MEDICARE

## 2018-06-13 VITALS
SYSTOLIC BLOOD PRESSURE: 147 MMHG | BODY MASS INDEX: 22.13 KG/M2 | WEIGHT: 141 LBS | HEART RATE: 55 BPM | DIASTOLIC BLOOD PRESSURE: 81 MMHG | HEIGHT: 67 IN

## 2018-06-13 DIAGNOSIS — I87.2 VENOUS INSUFFICIENCY OF RIGHT LOWER EXTREMITY: Primary | ICD-10-CM

## 2018-06-13 PROCEDURE — 99999 PR PBB SHADOW E&M-EST. PATIENT-LVL III: CPT | Mod: PBBFAC,,, | Performed by: THORACIC SURGERY (CARDIOTHORACIC VASCULAR SURGERY)

## 2018-06-13 PROCEDURE — 99213 OFFICE O/P EST LOW 20 MIN: CPT | Mod: PBBFAC,PO | Performed by: THORACIC SURGERY (CARDIOTHORACIC VASCULAR SURGERY)

## 2018-06-13 PROCEDURE — 99214 OFFICE O/P EST MOD 30 MIN: CPT | Mod: S$PBB,,, | Performed by: THORACIC SURGERY (CARDIOTHORACIC VASCULAR SURGERY)

## 2018-06-13 NOTE — PROGRESS NOTES
OFFICE VISIT NOTE    HISTORY OF PRESENT ILLNESS:  The patient is a 72-year-old female with venous   insufficiency of the right lesser saphenous vein and painful varicose veins of   the right lower extremity.  I had seen her in April and we decided to do   Veinlite-guided sclerotherapy of her varicose and reticular veins of bilateral   lower extremities.  At that time, I did not think that it was necessary to   undergo EVLT of the right lesser saphenous vein.  However, the patient states   that since she saw me, the right calf and leg pain has increased significantly.    Moreover, she is having some edema of the right lower extremity.  Previously,   she had very little edema, but now it has worsened.  Most of her varicose and   reticular veins are in the thighs, but the varicose veins in the legs and calves   have worsened.    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    PAST SURGICAL HISTORY:  Colonoscopy, dental implant surgery, and breast biopsy.    ALLERGIES:  Sulfa, penicillin, and rifampin.    MEDICATIONS:  Zyrtec, Flonase, Synthroid, multivitamins, Zaditor ophthalmic   solution, prednisone,  family history  of breast cancer, melanoma, and alcohol   abuse.    SOCIAL HISTORY:  The patient does not smoke cigarettes.  She drinks alcohol   occasionally.    REVIEW OF SYSTEMS:  She complains of painful varicose veins of bilateral lower   extremities with edema of the right foot, ankle, and distal leg.  This is new   since I last saw her.  Moreover, the varicose veins in the right calf and the   anterior aspect of the right leg have worsened, especially in the calf.  All   other systems are reviewed and are negative.    PHYSICAL EXAMINATION:  VITAL SIGNS:  Blood pressure is 147/81, respiratory rate is 18, heart rate 55,   height 5 feet 7 inches.  GENERAL:  She is  awake and alert, in no apparent distress.  HEENT:  Head is normocephalic.  Pupils are equal, round and reactive.  Sclerae   are anicteric.  NECK:  Supple.  Trachea midline.  No masses.  LUNGS:  Clear.  HEART:  Has a regular rate and rhythm.  ABDOMEN:  Soft and nontender.  No masses.  Bowel sounds are present.  EXTREMITIES:  She has varicose and reticular veins of bilateral lower   extremities.  These are worse in the thighs.  The veins in the right calf and   leg have worsened since I last saw her.  She has 1+ edema of the right lower   extremity.  Bilateral feet are well perfused and pink and warm.  NEUROLOGIC:  Awake, alert, and oriented.  No lateralizing neurologic deficits.    Ultrasound of the veins of the lower extremities showed venous insufficiency of   the right lesser saphenous vein with reflux time of 7564 milliseconds.    IMPRESSION:  1.  Venous insufficiency of the right lesser saphenous vein.  2.  Varicose veins with pain of bilateral lower extremities.  3.  Varicose veins with edema of the right lower extremity.    RECOMMENDATION:  The patient was scheduled for Veinlite-guided sclerotherapy of   veins of bilateral lower extremities.  The patient's symptoms have changed as   have physical findings over the last couple of months despite wearing   compression stockings and elevating the legs.  I think we should first proceed   with endovenous laser ablation of the right lesser saphenous vein and   subsequently do Veinlite-guided sclerotherapy of bilateral lower extremities.    Risks and benefits were discussed.  She voiced understanding and wished to   proceed.      DARIAN  dd: 06/13/2018 17:43:07 (CDT)  td: 06/14/2018 13:29:21 (CDT)  Doc ID   #[id number]  Job ID #[id number]    CC:

## 2018-09-17 ENCOUNTER — TELEPHONE (OUTPATIENT)
Dept: FAMILY MEDICINE | Facility: CLINIC | Age: 73
End: 2018-09-17

## 2018-10-15 ENCOUNTER — TELEPHONE (OUTPATIENT)
Dept: FAMILY MEDICINE | Facility: CLINIC | Age: 73
End: 2018-10-15

## 2018-10-15 ENCOUNTER — IMMUNIZATION (OUTPATIENT)
Dept: FAMILY MEDICINE | Facility: CLINIC | Age: 73
End: 2018-10-15
Payer: MEDICARE

## 2018-10-15 PROCEDURE — 90662 IIV NO PRSV INCREASED AG IM: CPT | Mod: PBBFAC,PO

## 2018-10-15 NOTE — TELEPHONE ENCOUNTER
----- Message from Cole Braun sent at 10/15/2018 11:38 AM CDT -----  Contact: patient  Type: Needs Medical Advice    Who Called:  Patient  Symptoms (please be specific):    How long has patient had these symptoms:    Pharmacy name and phone #:    Best Call Back Number: [phone]  Additional Information: requesting a call back regarding nurse appointment for flu shot,

## 2019-01-02 ENCOUNTER — PES CALL (OUTPATIENT)
Dept: ADMINISTRATIVE | Facility: CLINIC | Age: 74
End: 2019-01-02

## 2019-01-18 ENCOUNTER — OFFICE VISIT (OUTPATIENT)
Dept: FAMILY MEDICINE | Facility: CLINIC | Age: 74
End: 2019-01-18
Payer: MEDICARE

## 2019-01-18 ENCOUNTER — HOSPITAL ENCOUNTER (OUTPATIENT)
Dept: RADIOLOGY | Facility: HOSPITAL | Age: 74
Discharge: HOME | End: 2019-01-18
Attending: NURSE PRACTITIONER
Payer: MEDICARE

## 2019-01-18 VITALS
OXYGEN SATURATION: 96 % | HEART RATE: 85 BPM | BODY MASS INDEX: 22.39 KG/M2 | TEMPERATURE: 99 F | DIASTOLIC BLOOD PRESSURE: 80 MMHG | WEIGHT: 142.63 LBS | SYSTOLIC BLOOD PRESSURE: 136 MMHG | HEIGHT: 67 IN

## 2019-01-18 DIAGNOSIS — J20.9 ACUTE BRONCHITIS, UNSPECIFIED ORGANISM: Primary | ICD-10-CM

## 2019-01-18 DIAGNOSIS — R05.9 COUGH: ICD-10-CM

## 2019-01-18 DIAGNOSIS — R06.2 WHEEZING: ICD-10-CM

## 2019-01-18 PROCEDURE — 71046 X-RAY EXAM CHEST 2 VIEWS: CPT | Mod: TC,FY,PO

## 2019-01-18 PROCEDURE — 71046 X-RAY EXAM CHEST 2 VIEWS: CPT | Mod: 26,,, | Performed by: RADIOLOGY

## 2019-01-18 PROCEDURE — 94640 AIRWAY INHALATION TREATMENT: CPT | Mod: PBBFAC,PO

## 2019-01-18 PROCEDURE — 99999 PR PBB SHADOW E&M-EST. PATIENT-LVL III: ICD-10-PCS | Mod: PBBFAC,,, | Performed by: NURSE PRACTITIONER

## 2019-01-18 PROCEDURE — 99214 PR OFFICE/OUTPT VISIT, EST, LEVL IV, 30-39 MIN: ICD-10-PCS | Mod: S$PBB,,, | Performed by: NURSE PRACTITIONER

## 2019-01-18 PROCEDURE — 71046 XR CHEST PA AND LATERAL: ICD-10-PCS | Mod: 26,,, | Performed by: RADIOLOGY

## 2019-01-18 PROCEDURE — 99214 OFFICE O/P EST MOD 30 MIN: CPT | Mod: S$PBB,,, | Performed by: NURSE PRACTITIONER

## 2019-01-18 PROCEDURE — 99999 PR PBB SHADOW E&M-EST. PATIENT-LVL III: CPT | Mod: PBBFAC,,, | Performed by: NURSE PRACTITIONER

## 2019-01-18 PROCEDURE — 99213 OFFICE O/P EST LOW 20 MIN: CPT | Mod: PBBFAC,25,PO | Performed by: NURSE PRACTITIONER

## 2019-01-18 RX ORDER — GUAIFENESIN 600 MG/1
1200 TABLET, EXTENDED RELEASE ORAL 2 TIMES DAILY
Qty: 30 TABLET | Refills: 0 | Status: SHIPPED | OUTPATIENT
Start: 2019-01-18 | End: 2019-01-29 | Stop reason: ALTCHOICE

## 2019-01-18 RX ORDER — ALBUTEROL SULFATE 90 UG/1
2 AEROSOL, METERED RESPIRATORY (INHALATION) EVERY 6 HOURS PRN
Qty: 1 INHALER | Refills: 0 | Status: SHIPPED | OUTPATIENT
Start: 2019-01-18 | End: 2019-03-20 | Stop reason: SDUPTHER

## 2019-01-18 RX ORDER — METHYLPREDNISOLONE 4 MG/1
TABLET ORAL
Qty: 1 PACKAGE | Refills: 0 | Status: SHIPPED | OUTPATIENT
Start: 2019-01-18 | End: 2019-01-29 | Stop reason: ALTCHOICE

## 2019-01-18 RX ORDER — ALBUTEROL SULFATE 0.83 MG/ML
2.5 SOLUTION RESPIRATORY (INHALATION)
Status: COMPLETED | OUTPATIENT
Start: 2019-01-18 | End: 2019-01-18

## 2019-01-18 RX ADMIN — ALBUTEROL SULFATE 2.5 MG: 2.5 SOLUTION RESPIRATORY (INHALATION) at 08:01

## 2019-01-18 NOTE — PATIENT INSTRUCTIONS
Acute Bronchitis  Your healthcare provider has told you that you have acute bronchitis. Bronchitis is infection or inflammation of the bronchial tubes (airways in the lungs). Normally, air moves easily in and out of the airways. Bronchitis narrows the airways, making it harder for air to flow in and out of the lungs. This causes symptoms such as shortness of breath, coughing up yellow or green mucus, and wheezing. Bronchitis can be acute or chronic. Acute means the condition comes on quickly and goes away in a short time, usually within 3 to 10 days. Chronic means a condition lasts a long time and often comes back.    What causes acute bronchitis?  Acute bronchitis almost always starts as a viral respiratory infection, such as a cold or the flu. Certain factors make it more likely for a cold or flu to turn into bronchitis. These include being very young, being elderly, having a heart or lung problem, or having a weak immune system. Cigarette smoking also makes bronchitis more likely.  When bronchitis develops, the airways become swollen. The airways may also become infected with bacteria. This is known as a secondary infection.  Diagnosing acute bronchitis  Your healthcare provider will examine you and ask about your symptoms and health history. You may also have a sputum culture to test the fluid in your lungs. Chest X-rays may be done to look for infection in the lungs.  Treating acute bronchitis  Bronchitis usually clears up as the cold or flu goes away. You can help feel better faster by doing the following:  · Take medicine as directed. You may be told to take ibuprofen or other over-the-counter medicines. These help relieve inflammation in your bronchial tubes. Your healthcare provider may prescribe an inhaler to help open up the bronchial tubes. Most of the time, acute bronchitis is caused by a viral infection. Antibiotics are usually not prescribed for viral infections.  · Drink plenty of fluids, such as  water, juice, or warm soup. Fluids loosen mucus so that you can cough it up. This helps you breathe more easily. Fluids also prevent dehydration.  · Make sure you get plenty of rest.  · Do not smoke. Do not allow anyone else to smoke in your home.  Recovery and follow-up  Follow up with your doctor as you are told. You will likely feel better in a week or two. But a dry cough can linger beyond that time. Let your doctor know if you still have symptoms (other than a dry cough) after 2 weeks, or if youre prone to getting bronchial infections. Take steps to protect yourself from future infections. These steps include stopping smoking and avoiding tobacco smoke, washing your hands often, and getting a yearly flu shot.  When to call your healthcare provider  Call the healthcare provider if you have any of the following:  · Fever of 100.4°F (38.0°C) or higher, or as advised  · Symptoms that get worse, or new symptoms  · Trouble breathing  · Symptoms that dont start to improve within a week, or within 3 days of taking antibiotics   Date Last Reviewed: 12/1/2016  © 7361-8602 [employer], [employer]. [address] [place] [address], [place] [address]. All rights reserved. This information is not intended as a substitute for professional medical care. Always follow your healthcare professional's instructions.

## 2019-01-18 NOTE — PROGRESS NOTES
Please call the patient and let her know the lungs are clear. She can go ahead and  the steroids and albuterol. If symptoms worsen she should follow up. Thanks.

## 2019-01-18 NOTE — PROGRESS NOTES
Subjective:       Patient ID: Sandhya Madrigal is a 73 y.o. female.    Chief Complaint: Sore Throat (pt c/o burning in throat) and head congestion    Patient who is new to me presents with cough an sore throat.       Cough   This is a new problem. The current episode started in the past 7 days. The problem has been gradually worsening. The cough is productive of sputum. Associated symptoms include a fever (subjective), headaches, nasal congestion, postnasal drip, rhinorrhea, a sore throat and wheezing. Pertinent negatives include no chest pain, chills, ear pain, hemoptysis, myalgias, rash, shortness of breath, sweats or weight loss. Treatments tried: cough medication, delsym,  The treatment provided mild relief. There is no history of asthma, environmental allergies or pneumonia.     Review of Systems   Constitutional: Positive for appetite change, fatigue and fever (subjective). Negative for chills and weight loss.   HENT: Positive for congestion, postnasal drip, rhinorrhea and sore throat. Negative for ear pain, sinus pressure, sinus pain and sneezing.    Respiratory: Positive for cough and wheezing. Negative for hemoptysis, chest tightness and shortness of breath.    Cardiovascular: Negative for chest pain, palpitations and leg swelling.   Gastrointestinal: Negative for abdominal distention, abdominal pain, constipation, diarrhea, nausea and vomiting.   Genitourinary: Negative for decreased urine volume, difficulty urinating, dysuria, frequency and urgency.   Musculoskeletal: Negative for arthralgias, gait problem, joint swelling and myalgias.   Skin: Negative for rash and wound.   Allergic/Immunologic: Negative for environmental allergies.   Neurological: Positive for headaches. Negative for dizziness, light-headedness and numbness.       Objective:      Physical Exam   Constitutional: She is oriented to person, place, and time. She appears well-developed and well-nourished.   HENT:   Head: Normocephalic and  atraumatic.   Right Ear: External ear normal.   Left Ear: External ear normal.   Nose: Nose normal.   Mouth/Throat: Posterior oropharyngeal erythema present.   Eyes: Pupils are equal, round, and reactive to light.   Neck: Normal range of motion.   Cardiovascular: Normal rate, regular rhythm, normal heart sounds and intact distal pulses.   Pulmonary/Chest: Effort normal. She has no decreased breath sounds. She has wheezes in the right upper field, the right middle field, the right lower field, the left upper field, the left middle field and the left lower field. She has no rhonchi. She has no rales.   Wheezing auscultated throughout lungs clear s/p breathing treatment   Abdominal: Soft. Bowel sounds are normal.   Musculoskeletal: Normal range of motion.   Neurological: She is alert and oriented to person, place, and time.   Skin: Skin is warm and dry.   Nursing note and vitals reviewed.      Assessment:       1. Acute bronchitis, unspecified organism    2. Cough    3. Wheezing        Plan:       Acute bronchitis, unspecified organism  -     X-Ray Chest PA And Lateral; Future; Expected date: 01/18/2019  -     albuterol (VENTOLIN HFA) 90 mcg/actuation inhaler; Inhale 2 puffs into the lungs every 6 (six) hours as needed for Wheezing. Rescue  Dispense: 1 Inhaler; Refill: 0  -     methylPREDNISolone (MEDROL DOSEPACK) 4 mg tablet; use as directed  Dispense: 1 Package; Refill: 0  -     guaiFENesin (MUCINEX) 600 mg 12 hr tablet; Take 2 tablets (1,200 mg total) by mouth 2 (two) times daily.  Dispense: 30 tablet; Refill: 0    Cough  -     X-Ray Chest PA And Lateral; Future; Expected date: 01/18/2019  -     albuterol (VENTOLIN HFA) 90 mcg/actuation inhaler; Inhale 2 puffs into the lungs every 6 (six) hours as needed for Wheezing. Rescue  Dispense: 1 Inhaler; Refill: 0  -     methylPREDNISolone (MEDROL DOSEPACK) 4 mg tablet; use as directed  Dispense: 1 Package; Refill: 0  -     guaiFENesin (MUCINEX) 600 mg 12 hr tablet; Take 2  tablets (1,200 mg total) by mouth 2 (two) times daily.  Dispense: 30 tablet; Refill: 0    Wheezing  -     albuterol (VENTOLIN HFA) 90 mcg/actuation inhaler; Inhale 2 puffs into the lungs every 6 (six) hours as needed for Wheezing. Rescue  Dispense: 1 Inhaler; Refill: 0  -     methylPREDNISolone (MEDROL DOSEPACK) 4 mg tablet; use as directed  Dispense: 1 Package; Refill: 0  -     guaiFENesin (MUCINEX) 600 mg 12 hr tablet; Take 2 tablets (1,200 mg total) by mouth 2 (two) times daily.  Dispense: 30 tablet; Refill: 0    Other orders  -     albuterol nebulizer solution 2.5 mg      Patient breath sounds improved after breathing treatment. Chest xray clear for s/s of pneumonia. Advised to do OTCs, increase hydration, rest, and follow up with any new or concerning symptoms. Verbalized understanding.

## 2019-01-29 ENCOUNTER — OFFICE VISIT (OUTPATIENT)
Dept: FAMILY MEDICINE | Facility: CLINIC | Age: 74
End: 2019-01-29
Payer: MEDICARE

## 2019-01-29 VITALS
DIASTOLIC BLOOD PRESSURE: 80 MMHG | SYSTOLIC BLOOD PRESSURE: 130 MMHG | HEART RATE: 68 BPM | WEIGHT: 139.13 LBS | BODY MASS INDEX: 21.84 KG/M2 | HEIGHT: 67 IN

## 2019-01-29 DIAGNOSIS — E03.9 HYPOTHYROIDISM (ACQUIRED): ICD-10-CM

## 2019-01-29 DIAGNOSIS — N18.30 CHRONIC KIDNEY DISEASE, STAGE III (MODERATE): ICD-10-CM

## 2019-01-29 DIAGNOSIS — I83.813 VARICOSE VEINS OF BOTH LOWER EXTREMITIES WITH PAIN: ICD-10-CM

## 2019-01-29 DIAGNOSIS — M85.80 OSTEOPENIA, UNSPECIFIED LOCATION: ICD-10-CM

## 2019-01-29 DIAGNOSIS — Z13.6 ENCOUNTER FOR SCREENING FOR CARDIOVASCULAR DISORDERS: ICD-10-CM

## 2019-01-29 DIAGNOSIS — Z00.00 ENCOUNTER FOR PREVENTIVE HEALTH EXAMINATION: Primary | ICD-10-CM

## 2019-01-29 PROBLEM — Z12.11 COLON CANCER SCREENING: Status: RESOLVED | Noted: 2017-05-12 | Resolved: 2019-01-29

## 2019-01-29 PROBLEM — Z87.828 HISTORY OF EYE TRAUMA: Status: RESOLVED | Noted: 2017-03-14 | Resolved: 2019-01-29

## 2019-01-29 PROCEDURE — 99214 OFFICE O/P EST MOD 30 MIN: CPT | Mod: PBBFAC,PO | Performed by: NURSE PRACTITIONER

## 2019-01-29 PROCEDURE — 99999 PR PBB SHADOW E&M-EST. PATIENT-LVL IV: CPT | Mod: PBBFAC,,, | Performed by: NURSE PRACTITIONER

## 2019-01-29 PROCEDURE — G0439 PPPS, SUBSEQ VISIT: HCPCS | Mod: S$GLB,,, | Performed by: NURSE PRACTITIONER

## 2019-01-29 PROCEDURE — 99999 PR PBB SHADOW E&M-EST. PATIENT-LVL IV: ICD-10-PCS | Mod: PBBFAC,,, | Performed by: NURSE PRACTITIONER

## 2019-01-29 PROCEDURE — G0439 PR MEDICARE ANNUAL WELLNESS SUBSEQUENT VISIT: ICD-10-PCS | Mod: S$GLB,,, | Performed by: NURSE PRACTITIONER

## 2019-01-29 RX ORDER — LEVOTHYROXINE SODIUM 75 UG/1
75 TABLET ORAL
Qty: 90 TABLET | Refills: 0 | Status: SHIPPED | OUTPATIENT
Start: 2019-01-29 | End: 2019-06-06 | Stop reason: SDUPTHER

## 2019-01-29 NOTE — PATIENT INSTRUCTIONS
Counseling and Referral of Other Preventative  (Italic type indicates deductible and co-insurance are waived)    Patient Name: Sandhya Madrigal  Today's Date: 1/29/2019    Health Maintenance       Date Due Completion Date    Mammogram 06/15/2019 6/15/2018    Override on 12/16/2014: Done    Override on 5/6/2014: Done    DEXA SCAN 03/13/2020 3/13/2017    Override on 12/16/2014: Done    Lipid Panel 03/12/2023 3/12/2018    Override on 11/24/2014: Done    Colonoscopy 05/12/2027 5/12/2017    Override on 2/15/2007: Done    TETANUS VACCINE 03/14/2028 3/14/2018        No orders of the defined types were placed in this encounter.    The following information is provided to all patients.  This information is to help you find resources for any of the problems found today that may be affecting your health:                Living healthy guide: www.[facility].[place].[facility]      Understanding Diabetes: www.diabetes.org      Eating healthy: www.cdc.gov/healthyweight      [facility] home safety checklist: www.cdc.gov/steadi/patient.html      Agency on Aging: www.goea.[place]      Alcoholics anonymous (AA): www.aa.org      Physical Activity: www.june.nih.gov/wv4txgs      Tobacco use: www.quitwithusla.org

## 2019-01-29 NOTE — PROGRESS NOTES
"Sandhya Madrigal presented for a  Medicare AWV and comprehensive Health Risk Assessment today. The following components were reviewed and updated:    · Medical history  · Family History  · Social history  · Allergies and Current Medications  · Health Risk Assessment  · Health Maintenance  · Care Team     ** See Completed Assessments for Annual Wellness Visit within the encounter summary.**     The following assessments were completed:  · Living Situation  · CAGE  · Depression Screening  · Timed Get Up and Go  · Whisper Test  · Cognitive Function Screening      · Nutrition Screening  · ADL Screening  · PAQ Screening    Vitals:    01/29/19 1014   BP: 130/80   Pulse: 68   Weight: 63.1 kg (139 lb 1.8 oz)   Height: 5' 7" (1.702 m)     Body mass index is 21.79 kg/m².  Physical Exam   Constitutional: She is oriented to person, place, and time. She appears well-nourished.   Cardiovascular: Normal rate, regular rhythm, normal heart sounds and intact distal pulses.   Neurological: She is alert and oriented to person, place, and time.   Skin: Skin is warm and dry.   Vitals reviewed.        Diagnoses and health risks identified today and associated recommendations/orders:    1. Encounter for preventive health examination  Reviewed and discussed health maintenance.    - levothyroxine (SYNTHROID) 75 MCG tablet; Take 1 tablet (75 mcg total) by mouth before breakfast. LEVOTHYROXINE SODIUM 75 MCG TABS  Dispense: 90 tablet; Refill: 0  - Comprehensive metabolic panel; Future  - Lipid panel; Future  - TSH; Future    2. Encounter for screening for cardiovascular disorders  - Comprehensive metabolic panel; Future  - Lipid panel; Future    3. Hypothyroidism (acquired)  Stable- continue current treatment and follow up routinely with PCP   - levothyroxine (SYNTHROID) 75 MCG tablet; Take 1 tablet (75 mcg total) by mouth before breakfast. LEVOTHYROXINE SODIUM 75 MCG TABS  Dispense: 90 tablet; Refill: 0  - TSH; Future    4. Chronic kidney disease, " stage III (moderate)  Stable- continue current treatment and follow up routinely with PCP   Labs reviewed and repeat labs ordered.   Avoid NSAIDs and increase fluids    5. Osteopenia, unspecified location  Stable- continue current treatment and follow up routinely with PCP     6. Varicose veins of both lower extremities with pain  Compression stockings and elevate extremities while sitting  Follow up with  as discussed    Provided Sandhya with a 5-10 year written screening schedule and personal prevention plan. Recommendations were developed using the USPSTF age appropriate recommendations. Education, counseling, and referrals were provided as needed. After Visit Summary printed and given to patient which includes a list of additional screenings\tests needed.    Sabiha Webb, NP

## 2019-02-19 ENCOUNTER — PATIENT MESSAGE (OUTPATIENT)
Dept: VASCULAR SURGERY | Facility: CLINIC | Age: 74
End: 2019-02-19

## 2019-02-20 ENCOUNTER — PATIENT MESSAGE (OUTPATIENT)
Dept: VASCULAR SURGERY | Facility: CLINIC | Age: 74
End: 2019-02-20

## 2019-02-23 ENCOUNTER — PATIENT MESSAGE (OUTPATIENT)
Dept: VASCULAR SURGERY | Facility: CLINIC | Age: 74
End: 2019-02-23

## 2019-03-01 ENCOUNTER — PATIENT MESSAGE (OUTPATIENT)
Dept: VASCULAR SURGERY | Facility: CLINIC | Age: 74
End: 2019-03-01

## 2019-03-04 ENCOUNTER — PATIENT MESSAGE (OUTPATIENT)
Dept: VASCULAR SURGERY | Facility: CLINIC | Age: 74
End: 2019-03-04

## 2019-03-11 ENCOUNTER — LAB VISIT (OUTPATIENT)
Dept: LAB | Facility: HOSPITAL | Age: 74
End: 2019-03-11
Payer: MEDICARE

## 2019-03-11 DIAGNOSIS — Z13.6 ENCOUNTER FOR SCREENING FOR CARDIOVASCULAR DISORDERS: ICD-10-CM

## 2019-03-11 DIAGNOSIS — Z00.00 ENCOUNTER FOR PREVENTIVE HEALTH EXAMINATION: ICD-10-CM

## 2019-03-11 DIAGNOSIS — E03.9 HYPOTHYROIDISM (ACQUIRED): ICD-10-CM

## 2019-03-11 LAB
ALBUMIN SERPL BCP-MCNC: 3.6 G/DL
ALP SERPL-CCNC: 61 U/L
ALT SERPL W/O P-5'-P-CCNC: 16 U/L
ANION GAP SERPL CALC-SCNC: 8 MMOL/L
AST SERPL-CCNC: 20 U/L
BILIRUB SERPL-MCNC: 0.6 MG/DL
BUN SERPL-MCNC: 19 MG/DL
CALCIUM SERPL-MCNC: 9.3 MG/DL
CHLORIDE SERPL-SCNC: 107 MMOL/L
CHOLEST SERPL-MCNC: 205 MG/DL
CHOLEST/HDLC SERPL: 3.2 {RATIO}
CO2 SERPL-SCNC: 25 MMOL/L
CREAT SERPL-MCNC: 1 MG/DL
EST. GFR  (AFRICAN AMERICAN): >60 ML/MIN/1.73 M^2
EST. GFR  (NON AFRICAN AMERICAN): 56.4 ML/MIN/1.73 M^2
GLUCOSE SERPL-MCNC: 83 MG/DL
HDLC SERPL-MCNC: 65 MG/DL
HDLC SERPL: 31.7 %
LDLC SERPL CALC-MCNC: 123 MG/DL
NONHDLC SERPL-MCNC: 140 MG/DL
POTASSIUM SERPL-SCNC: 4.3 MMOL/L
PROT SERPL-MCNC: 6.3 G/DL
SODIUM SERPL-SCNC: 140 MMOL/L
TRIGL SERPL-MCNC: 85 MG/DL
TSH SERPL DL<=0.005 MIU/L-ACNC: 1.31 UIU/ML

## 2019-03-11 PROCEDURE — 36415 COLL VENOUS BLD VENIPUNCTURE: CPT | Mod: PO

## 2019-03-11 PROCEDURE — 80061 LIPID PANEL: CPT

## 2019-03-11 PROCEDURE — 80053 COMPREHEN METABOLIC PANEL: CPT

## 2019-03-11 PROCEDURE — 84443 ASSAY THYROID STIM HORMONE: CPT

## 2019-03-20 ENCOUNTER — OFFICE VISIT (OUTPATIENT)
Dept: FAMILY MEDICINE | Facility: CLINIC | Age: 74
End: 2019-03-20
Payer: MEDICARE

## 2019-03-20 VITALS
WEIGHT: 141.13 LBS | HEART RATE: 53 BPM | SYSTOLIC BLOOD PRESSURE: 129 MMHG | HEIGHT: 67 IN | DIASTOLIC BLOOD PRESSURE: 70 MMHG | BODY MASS INDEX: 22.15 KG/M2

## 2019-03-20 DIAGNOSIS — R13.10 DYSPHAGIA, UNSPECIFIED TYPE: Primary | ICD-10-CM

## 2019-03-20 DIAGNOSIS — Z86.39 HISTORY OF HYPERPARATHYROIDISM: ICD-10-CM

## 2019-03-20 DIAGNOSIS — J30.81 ALLERGIC RHINITIS DUE TO ANIMAL HAIR AND DANDER: ICD-10-CM

## 2019-03-20 DIAGNOSIS — R05.9 COUGH: ICD-10-CM

## 2019-03-20 DIAGNOSIS — R06.2 WHEEZING: ICD-10-CM

## 2019-03-20 DIAGNOSIS — M21.619 BUNION: ICD-10-CM

## 2019-03-20 DIAGNOSIS — Z12.39 BREAST CANCER SCREENING: ICD-10-CM

## 2019-03-20 DIAGNOSIS — J20.9 ACUTE BRONCHITIS, UNSPECIFIED ORGANISM: ICD-10-CM

## 2019-03-20 PROCEDURE — 99999 PR PBB SHADOW E&M-EST. PATIENT-LVL IV: CPT | Mod: PBBFAC,,, | Performed by: FAMILY MEDICINE

## 2019-03-20 PROCEDURE — 99214 PR OFFICE/OUTPT VISIT, EST, LEVL IV, 30-39 MIN: ICD-10-PCS | Mod: S$PBB,,, | Performed by: FAMILY MEDICINE

## 2019-03-20 PROCEDURE — 99214 OFFICE O/P EST MOD 30 MIN: CPT | Mod: S$PBB,,, | Performed by: FAMILY MEDICINE

## 2019-03-20 PROCEDURE — 99999 PR PBB SHADOW E&M-EST. PATIENT-LVL IV: ICD-10-PCS | Mod: PBBFAC,,, | Performed by: FAMILY MEDICINE

## 2019-03-20 PROCEDURE — 99214 OFFICE O/P EST MOD 30 MIN: CPT | Mod: PBBFAC,PO | Performed by: FAMILY MEDICINE

## 2019-03-20 RX ORDER — ALBUTEROL SULFATE 90 UG/1
2 AEROSOL, METERED RESPIRATORY (INHALATION) EVERY 6 HOURS PRN
Qty: 1 INHALER | Refills: 5 | Status: SHIPPED | OUTPATIENT
Start: 2019-03-20

## 2019-03-20 NOTE — PROGRESS NOTES
"Subjective:       Patient ID: Sandhya Madrigal is a 73 y.o. female.    Chief Complaint: Annual Exam    This pt is new to me.      Review of Systems   Constitutional: Negative for activity change and unexpected weight change.   HENT: Negative for hearing loss, rhinorrhea and trouble swallowing (feels "scratchiness" when she swallow).    Eyes: Negative for discharge and visual disturbance.   Respiratory: Positive for wheezing. Negative for chest tightness.    Cardiovascular: Negative for chest pain and palpitations.   Gastrointestinal: Negative for blood in stool, constipation, diarrhea and vomiting.   Endocrine: Negative for polydipsia and polyuria.   Genitourinary: Negative for difficulty urinating, dysuria, hematuria and menstrual problem.   Musculoskeletal: Negative for arthralgias, joint swelling and neck pain.   Neurological: Negative for weakness and headaches.   Psychiatric/Behavioral: Negative for confusion and dysphoric mood.       Objective:       Vitals:    03/20/19 1016   BP: 129/70   BP Location: Left arm   Patient Position: Sitting   BP Method: Medium (Manual)   Pulse: (!) 53   Weight: 64 kg (141 lb 1.5 oz)   Height: 5' 7" (1.702 m)     Physical Exam   Constitutional: She is oriented to person, place, and time. She appears well-developed and well-nourished.   HENT:   Head: Normocephalic.   Eyes: Conjunctivae and EOM are normal. Pupils are equal, round, and reactive to light.   Neck: Normal range of motion. Neck supple. No thyromegaly present.   Cardiovascular: Normal rate, regular rhythm and normal heart sounds.   Pulmonary/Chest: Effort normal and breath sounds normal.   Abdominal: Soft. Bowel sounds are normal. There is no tenderness.   Musculoskeletal: Normal range of motion. She exhibits no tenderness or deformity.   Lymphadenopathy:     She has no cervical adenopathy.   Neurological: She is alert and oriented to person, place, and time. She displays normal reflexes. No cranial nerve deficit. She " exhibits normal muscle tone. Coordination normal.   Skin: Skin is warm and dry.   Psychiatric: She has a normal mood and affect. Her behavior is normal.       Assessment:       1. Dysphagia, unspecified type    2. History of hyperparathyroidism    3. Acute bronchitis, unspecified organism    4. Cough    5. Wheezing    6. Allergic rhinitis due to animal hair and dander    7. Bunion    8. Breast cancer screening        Plan:       Sandhya was seen today for annual exam.    Diagnoses and all orders for this visit:    Dysphagia, unspecified type  -     Ambulatory consult to ENT    History of hyperparathyroidism  -     PTH, intact; Future    Acute bronchitis, unspecified organism  -     albuterol (VENTOLIN HFA) 90 mcg/actuation inhaler; Inhale 2 puffs into the lungs every 6 (six) hours as needed for Wheezing. Rescue    Cough  -     Ambulatory referral to Allergy  -     albuterol (VENTOLIN HFA) 90 mcg/actuation inhaler; Inhale 2 puffs into the lungs every 6 (six) hours as needed for Wheezing. Rescue    Wheezing  -     beclomethasone (QVAR) 80 mcg/actuation Aero; Inhale 1 puff into the lungs 2 (two) times daily. Controller  -     albuterol (VENTOLIN HFA) 90 mcg/actuation inhaler; Inhale 2 puffs into the lungs every 6 (six) hours as needed for Wheezing. Rescue    Allergic rhinitis due to animal hair and dander  -     Ambulatory referral to Allergy    Bunion  -     Ambulatory consult to Podiatry    Breast cancer screening  -     Mammo Digital Screening Bilateral With CAD; Future      During this visit, I reviewed the pt's history, medications, allergies, and problem list.

## 2019-03-21 ENCOUNTER — TELEPHONE (OUTPATIENT)
Dept: FAMILY MEDICINE | Facility: CLINIC | Age: 74
End: 2019-03-21

## 2019-03-21 DIAGNOSIS — J45.20 MILD INTERMITTENT ASTHMA WITHOUT COMPLICATION: Primary | ICD-10-CM

## 2019-03-21 RX ORDER — FLUTICASONE PROPIONATE 110 UG/1
1 AEROSOL, METERED RESPIRATORY (INHALATION) 2 TIMES DAILY
Qty: 12 G | Refills: 5 | Status: SHIPPED | OUTPATIENT
Start: 2019-03-21 | End: 2019-05-22 | Stop reason: ALTCHOICE

## 2019-03-21 NOTE — TELEPHONE ENCOUNTER
Qvar is not covered by [employer].  Alternatives (all tier 3): Peobably require a PA but are covered.  Flovent HFA 44mcg, 110mcg, 220mcg  Flovent Diskus 50mcg, 100mcg, 250mcg  Arnuity 50mcg, 100mcg, 200mcg  Please advise.

## 2019-03-25 ENCOUNTER — OFFICE VISIT (OUTPATIENT)
Dept: OTOLARYNGOLOGY | Facility: CLINIC | Age: 74
End: 2019-03-25
Payer: MEDICARE

## 2019-03-25 VITALS — BODY MASS INDEX: 22.11 KG/M2 | WEIGHT: 140.88 LBS | HEIGHT: 67 IN

## 2019-03-25 DIAGNOSIS — R09.89 CHRONIC THROAT CLEARING: ICD-10-CM

## 2019-03-25 DIAGNOSIS — R07.0 THROAT DISCOMFORT: ICD-10-CM

## 2019-03-25 DIAGNOSIS — K21.9 LPRD (LARYNGOPHARYNGEAL REFLUX DISEASE): Primary | ICD-10-CM

## 2019-03-25 DIAGNOSIS — J35.8 TONSILLOLITH: ICD-10-CM

## 2019-03-25 DIAGNOSIS — R09.A2 SENSATION OF FOREIGN BODY IN THROAT: ICD-10-CM

## 2019-03-25 DIAGNOSIS — R09.A2 GLOBUS SENSATION: ICD-10-CM

## 2019-03-25 PROCEDURE — 99214 OFFICE O/P EST MOD 30 MIN: CPT | Mod: 25,S$PBB,ICN, | Performed by: NURSE PRACTITIONER

## 2019-03-25 PROCEDURE — 99999 PR PBB SHADOW E&M-EST. PATIENT-LVL IV: ICD-10-PCS | Mod: PBBFAC,,, | Performed by: NURSE PRACTITIONER

## 2019-03-25 PROCEDURE — 99214 PR OFFICE/OUTPT VISIT, EST, LEVL IV, 30-39 MIN: ICD-10-PCS | Mod: 25,S$PBB,ICN, | Performed by: NURSE PRACTITIONER

## 2019-03-25 PROCEDURE — 31575 DIAGNOSTIC LARYNGOSCOPY: CPT | Mod: PBBFAC,PO | Performed by: NURSE PRACTITIONER

## 2019-03-25 PROCEDURE — 31575 PR LARYNGOSCOPY, FLEXIBLE; DIAGNOSTIC: ICD-10-PCS | Mod: S$PBB,ICN,, | Performed by: NURSE PRACTITIONER

## 2019-03-25 PROCEDURE — 99214 OFFICE O/P EST MOD 30 MIN: CPT | Mod: PBBFAC,PO,25 | Performed by: NURSE PRACTITIONER

## 2019-03-25 PROCEDURE — 31575 DIAGNOSTIC LARYNGOSCOPY: CPT | Mod: S$PBB,ICN,, | Performed by: NURSE PRACTITIONER

## 2019-03-25 PROCEDURE — 99999 PR PBB SHADOW E&M-EST. PATIENT-LVL IV: CPT | Mod: PBBFAC,,, | Performed by: NURSE PRACTITIONER

## 2019-03-25 RX ORDER — OMEPRAZOLE 40 MG/1
40 CAPSULE, DELAYED RELEASE ORAL
Qty: 30 CAPSULE | Refills: 11 | Status: SHIPPED | OUTPATIENT
Start: 2019-03-25 | End: 2019-05-22

## 2019-03-25 NOTE — LETTER
March 25, 2019      IVIS Taylor MD  [address] [facility] [address] [place] [address]           [facility]  [address] [facility] [address] [place] [address]  Phone: [phone]  Fax: [phone]          Patient: Sandhya Madrigal   MR Number: [id number]   YOB: 1945   Date of Visit: 3/25/2019       Dear Dr. IVIS Taylor:    Thank you for referring Sandhya Madrigal to me for evaluation. Attached you will find relevant portions of my assessment and plan of care.    If you have questions, please do not hesitate to call me. I look forward to following Sandhya Madrigal along with you.    Sincerely,    Katelyn Patten, ASHLEY    Enclosure  CC:  No Recipients    If you would like to receive this communication electronically, please contact [email or web address] or [phone] to request more information on [employer] Link access.    For providers and/or their staff who would like to refer a patient to [facility], please contact us through our one-stop-shop provider referral line, [facility], at [phone].    If you feel you have received this communication in error or would no longer like to receive these types of communications, please e-mail [email or web address]

## 2019-03-25 NOTE — PATIENT INSTRUCTIONS
"  How Acid Reflux Affects Your Throat    Do you have to clear your throat or cough often? Are you hoarse? Do you have trouble swallowing? If you have these or other throat symptoms, you may have acid reflux. This occurs when stomach acid flows back up and irritates your throat.  Why you have throat symptoms  There are muscles (esophageal sphincters) at both ends of the tube that carries food to your stomach (the esophagus). These muscles relax to let food pass. Then they tighten to keep stomach acid down. When the lower esophageal sphincter (LES) doesnt tighten enough, acid can flow back (reflux) from your stomach into your esophagus. This may cause heartburn. In some cases the upper esophageal sphincter (UES) also doesnt work well. Then acid can travel higher and enter your throat (pharynx). In many cases, this causes throat symptoms.  Common throat symptoms  · Need to clear your throat often  · Feeling like youre choking  · Long-term (chronic) cough  · Hoarseness  · Trouble swallowing  · Feel like you have a lump in your throat  · Sour or acid taste  · Sore throat that keeps coming back     LARYNGOPHARYNGEAL REFLUX  (SILENT OR ATYPICAL REFLUX)    If you have any of the following symptoms you may have laryngopharyngeal reflux (LPR):  hoarseness, thick or too much mucus, chronic throat pain/irritation, chronic throat clearing, chronic cough, especially cough that wake you up from sleep, chronic "postnasal drip" without the need to blow your nose.     Many people with LPR do not have symptoms of heartburn. Compared to the esophagus, the voice box and the back of the throat are significantly more sensitive to the effects of acid on surrounding tissue. Acid passing quickly through the esophagus does not have a chance to irritate the area for too long.  However acid that pools in the throat or voice box can cause prolonged irritation resulting in the symptoms of LPR. In patients known to have LPR, 71% reported " "hoarseness, 51% reported chronic cough, 47% reported sensation of thickness or lump in the back of the throat, 42% reported chronic throat clearing, and 35% reported trouble swallowing.     Another major symptom of LPR is "postnasal drip."  Patients are often told symptoms are due to abnormal nasal drainage or sinus infection; however this is rarely the cause of chronic throat irritation. For post nasal drip to cause the complaints described, signs and symptoms of an active nasal infection should be present.     Treatments for LPR include:  postural changes, weight reduction, diet modification, medication to reduce stomach acid and promote normal motility, and surgery to prevent reflux. Most patients will begin to notice some relief in her symptoms about 2 weeks after starting the medication; however it is generally recommended the medication should be continued for 2 months. If the symptoms completely resolve, the medication can then be tapered.  Some people will remain symptom free while others may have relapses which required treatment again.    Things you can do to prevent reflux include:  Do not smoke.  Smoking will cause reflux.  Avoid tight fitting clothes or belts around the waist.  Avoid vigorous exercise at least 2 hours before bedtime. Avoid eating at least 2 hours prior to bedtime.  In fact avoid eating your largest meal at night.  Weight loss.  For patient's with recent weight gain, shedding a few pounds is all that is required to improve reflux.  Avoid caffeine, cola beverages, citrus beverages, mints, alcoholic beverages, particularly at night, cheese, fried foods, spicy foods, eggs, and chocolate.  Sleep with the head of bed elevated at least 6 inches ("MedCline" wedge pillow).    Recommendations:    Take Nexium or Prilosec (PPI) every morning on an empty stomach (30-60 minutes before eating) 40 MG.   At bedtime take Zantac (H2-blocker) 300 mg.    After 4-8 weeks, with significant symptomatic " improvement, you may begin weaning your reflux medications down:  Nexium or Prilosec 40 mg --> to 20 mg (over-the-counter strength).  Zantac 300 mg --> to 150 mg (over-the-counter stength).  Then continue to wean as symptoms allow.    See a Gastro doctor (GI) for refractory symptoms and continued management.   If not improved in 4 weeks, you are to notify me so that we can proceed with CT scan of neck.

## 2019-03-25 NOTE — PROGRESS NOTES
"Subjective:       Patient ID: Sandhya Madrigal is a 73 y.o. female.    Chief Complaint: Sore Throat (sensation of lump in throat)    HPI   Patient reports foreign body sensation in her left throat, below her tonsil. She has a h/o tonsilloliths, but this is lower. Feels like a "growth." She is concerned because her mother  of throat cancer. Her mother was a chronic tobacco smoker. Patient smoked a little in high school but none is 50-55 years.     Review of Systems   Constitutional: Negative.    HENT: Positive for postnasal drip, rhinorrhea, sneezing and sore throat.         Frequent throat clearing  Sensation of lump or swelling in the back of her throat   Eyes: Negative.    Respiratory: Negative.    Cardiovascular: Negative.    Gastrointestinal: Negative.    Musculoskeletal: Negative.    Skin: Negative.    Neurological: Negative.    Hematological: Negative.    Psychiatric/Behavioral: Negative.        Objective:      Physical Exam   Constitutional: She is oriented to person, place, and time. Vital signs are normal. She appears well-developed and well-nourished. She is cooperative. She does not appear ill. No distress.   HENT:   Head: Normocephalic and atraumatic.   Right Ear: Hearing, tympanic membrane, external ear and ear canal normal. Tympanic membrane is not erythematous. No middle ear effusion.   Left Ear: Hearing, tympanic membrane, external ear and ear canal normal. Tympanic membrane is not erythematous.  No middle ear effusion.   Nose: Nose normal. No mucosal edema or rhinorrhea. Right sinus exhibits no maxillary sinus tenderness and no frontal sinus tenderness. Left sinus exhibits no maxillary sinus tenderness and no frontal sinus tenderness.   Mouth/Throat: Uvula is midline, oropharynx is clear and moist and mucous membranes are normal. Mucous membranes are not pale, not dry and not cyanotic. No oral lesions. No oropharyngeal exudate, posterior oropharyngeal edema or posterior oropharyngeal erythema. "       Eyes: Pupils are equal, round, and reactive to light. Conjunctivae, EOM and lids are normal. Right eye exhibits no discharge. Left eye exhibits no discharge. No scleral icterus.   Neck: Trachea normal and normal range of motion. Neck supple. No tracheal deviation present. No thyroid mass and no thyromegaly present.   Cardiovascular: Normal rate.   Pulmonary/Chest: Effort normal. No stridor. No respiratory distress. She has no wheezes.   Musculoskeletal: Normal range of motion.   Lymphadenopathy:        Head (right side): No submental, no submandibular, no tonsillar, no preauricular and no posterior auricular adenopathy present.        Head (left side): No submental, no submandibular, no tonsillar, no preauricular and no posterior auricular adenopathy present.     She has no cervical adenopathy.        Right cervical: No superficial cervical and no posterior cervical adenopathy present.       Left cervical: No superficial cervical and no posterior cervical adenopathy present.   Neurological: She is alert and oriented to person, place, and time. She has normal strength. Coordination and gait normal.   Skin: Skin is warm, dry and intact. No lesion and no rash noted. She is not diaphoretic. No cyanosis. No pallor.   Psychiatric: She has a normal mood and affect. Her speech is normal and behavior is normal. Judgment and thought content normal. Cognition and memory are normal.   Nursing note and vitals reviewed.      Procedure: Flexible laryngoscopy    In order to fully examine the upper aerodigestive tract, including the larynx, in a patient with a hyperactive gag reflex, and suboptimal visualization with indirect mirror exam,  flexible endoscopy is required.   After explaining the procedure and obtaining verbal consent, a timeout was performed with the patient's participation according to the universal protocol. Both nasal cavities were anesthetized with 4% Xylocaine spray mixed with Chato-Synephrine. The flexible  laryngoscope  was inserted into the nasal cavity and advanced to visualize the nasal cavity, nasopharynx, the posterior oropharynx, hypopharynx, and the endolarynx with the  findings noted. The scope was removed and the procedure terminated. The patient tolerated this procedure well without apparent complication.     OVERALL FINDINGS  Nasopharynx - the torus is clear. There are no lesions of the posterior wall.   Oropharynx - no lesions of the tongue base. There is no obvious fullness or asymmetry.  Hypopharynx - there are no lesions of the pyriform sinuses or postcricoid region   Larynx - there are no lesions of the supraglottic or glottic larynx.  Vocal fold mobility is normal.     SPECIFIC FINDINGS  Adenoid tissue - normal   Nasopharynx & eustachian tube orifices - normal   Posterior pharyngeal wall - normal   Base of tongue - normal   Epiglottis - normal   Valleculae - normal   Pyriform sinuses - normal   False vocal cords - normal   True vocal cords - normal  Arytenoids - normal   Interarytenoid space - edema, erythema   Right BOT    Left BOT    Larynx    Larynx (marked reflux changes)    Assessment:     Left tonsillolith extracted for patient today with stainless steel curet in office    LPRD/GERD  Plan:     Plastic curet given for tonsillolith removal at home    Advised/Cautioned: The results of today's ENT exam and flexible endoscopy were detailed to the patient and her questions were answered. Patient education centered around GERD, known exacerbants and contemporary treatment options. Laryngoscope photos were given to the patient. Handouts given on LPRD and GERD were given to the patient. After review of these, patient elected to be placed on PPI 40 mg QAM on an empty stomach for the next 6-8 weeks, and H2-blocker QHS. I encouraged the patient once she has completed the evening meal to not snack or consume any other food products or caffeinated beverages for at least  minutes before retiring.  Finally, I encouraged the patient to sleep about 30 degrees above horizontal, and this can be facilitated by using 2-3 pillows or a wedge foam product. If the patient is not demonstrably improved in 6-8 weeks, consultation with gastroenterology may be indicated to rule out intrinsic disease in the lower esophagus, stomach, or proximal duodenum.   If not better in 3-4 weeks, will proceed with CT STN as discussed today -- pt to call

## 2019-03-27 ENCOUNTER — PATIENT MESSAGE (OUTPATIENT)
Dept: FAMILY MEDICINE | Facility: CLINIC | Age: 74
End: 2019-03-27

## 2019-03-27 ENCOUNTER — PATIENT MESSAGE (OUTPATIENT)
Dept: OTOLARYNGOLOGY | Facility: CLINIC | Age: 74
End: 2019-03-27

## 2019-03-27 DIAGNOSIS — R79.89 HIGH SERUM PARATHYROID HORMONE (PTH): Primary | ICD-10-CM

## 2019-04-05 ENCOUNTER — PATIENT MESSAGE (OUTPATIENT)
Dept: VASCULAR SURGERY | Facility: CLINIC | Age: 74
End: 2019-04-05

## 2019-04-17 ENCOUNTER — LAB VISIT (OUTPATIENT)
Dept: LAB | Facility: HOSPITAL | Age: 74
End: 2019-04-17
Attending: ALLERGY & IMMUNOLOGY
Payer: MEDICARE

## 2019-04-17 ENCOUNTER — OFFICE VISIT (OUTPATIENT)
Dept: ALLERGY | Facility: CLINIC | Age: 74
End: 2019-04-17
Payer: MEDICARE

## 2019-04-17 VITALS — WEIGHT: 141.31 LBS | BODY MASS INDEX: 22.18 KG/M2 | OXYGEN SATURATION: 98 % | HEIGHT: 67 IN | HEART RATE: 74 BPM

## 2019-04-17 DIAGNOSIS — H10.423 SIMPLE CHRONIC CONJUNCTIVITIS OF BOTH EYES: ICD-10-CM

## 2019-04-17 DIAGNOSIS — J31.0 CHRONIC RHINITIS: Primary | ICD-10-CM

## 2019-04-17 DIAGNOSIS — J31.0 CHRONIC RHINITIS: ICD-10-CM

## 2019-04-17 LAB — IGE SERPL-ACNC: 150 IU/ML (ref 0–100)

## 2019-04-17 PROCEDURE — 99213 OFFICE O/P EST LOW 20 MIN: CPT | Mod: PBBFAC,PO | Performed by: ALLERGY & IMMUNOLOGY

## 2019-04-17 PROCEDURE — 36415 COLL VENOUS BLD VENIPUNCTURE: CPT | Mod: PO

## 2019-04-17 PROCEDURE — 99999 PR PBB SHADOW E&M-EST. PATIENT-LVL III: CPT | Mod: PBBFAC,,, | Performed by: ALLERGY & IMMUNOLOGY

## 2019-04-17 PROCEDURE — 99999 PR PBB SHADOW E&M-EST. PATIENT-LVL III: ICD-10-PCS | Mod: PBBFAC,,, | Performed by: ALLERGY & IMMUNOLOGY

## 2019-04-17 PROCEDURE — 82785 ASSAY OF IGE: CPT

## 2019-04-17 PROCEDURE — 99204 OFFICE O/P NEW MOD 45 MIN: CPT | Mod: S$PBB,,, | Performed by: ALLERGY & IMMUNOLOGY

## 2019-04-17 PROCEDURE — 99204 PR OFFICE/OUTPT VISIT, NEW, LEVL IV, 45-59 MIN: ICD-10-PCS | Mod: S$PBB,,, | Performed by: ALLERGY & IMMUNOLOGY

## 2019-04-17 PROCEDURE — 86003 ALLG SPEC IGE CRUDE XTRC EA: CPT

## 2019-04-17 PROCEDURE — 86003 ALLG SPEC IGE CRUDE XTRC EA: CPT | Mod: 59

## 2019-04-17 NOTE — PROGRESS NOTES
Subjective:       Patient ID: Sandhya Madrigal is a 73 y.o. female.    Chief Complaint:  Allergies (pcp referred for allergies)      74 yo woman presents for new patient evaluation of allergies. She reports she moved here from [place]. She had allergy test many years ago there and had lots of positives. She has constant PND. Then flares with congestion, sneeze, runny nose, red itchy eyes, hoarse voice. She takes zyrtec daily and Flonase 1 SEN daily and uses sinus rinse. At times increase Flonase to 2 SEN daily. In March she was much worse, has gotten better. In January she returned form trip to [place] and was sick and diagnosed with bronchitis. Had wheeze for first time. Sued albuterol for few weeks and resolved. Was prescribed Flovent but never did use. Now no wheeze or SOB or albuterol use. She has no H/o asthma or eczema. She knows she is allergic to cats, will sneeze, runny nose, congestion and SOB. No known food, insect or latex allergy.       Environmental History: see history section for home environment  Review of Systems   Constitutional: Negative for appetite change, chills, fatigue and fever.   HENT: Positive for congestion, postnasal drip, rhinorrhea, sinus pressure and sneezing. Negative for ear discharge, ear pain, facial swelling, nosebleeds, sore throat, trouble swallowing and voice change.    Eyes: Positive for pain, discharge and itching. Negative for redness and visual disturbance.   Respiratory: Positive for cough. Negative for choking, chest tightness, shortness of breath and wheezing.    Cardiovascular: Negative for chest pain, palpitations and leg swelling.   Gastrointestinal: Negative for abdominal distention, abdominal pain, constipation, diarrhea, nausea and vomiting.   Genitourinary: Negative for difficulty urinating.   Musculoskeletal: Negative for arthralgias, gait problem, joint swelling and myalgias.   Skin: Negative for color change and rash.   Neurological: Negative for dizziness,  syncope, weakness, light-headedness and headaches.   Hematological: Negative for adenopathy. Does not bruise/bleed easily.   Psychiatric/Behavioral: Negative for agitation, behavioral problems, confusion and sleep disturbance. The patient is not nervous/anxious.         Objective:      Physical Exam   Constitutional: She is oriented to person, place, and time. She appears well-developed and well-nourished. No distress.   HENT:   Head: Normocephalic and atraumatic.   Right Ear: Hearing, tympanic membrane, external ear and ear canal normal.   Left Ear: Hearing, tympanic membrane, external ear and ear canal normal.   Nose: No mucosal edema, rhinorrhea, sinus tenderness or septal deviation. No epistaxis. Right sinus exhibits no maxillary sinus tenderness and no frontal sinus tenderness. Left sinus exhibits no maxillary sinus tenderness and no frontal sinus tenderness.   Mouth/Throat: Uvula is midline, oropharynx is clear and moist and mucous membranes are normal. No uvula swelling.   Eyes: Conjunctivae are normal. Right eye exhibits no discharge. Left eye exhibits no discharge.   Neck: Normal range of motion. No thyromegaly present.   Cardiovascular: Normal rate, regular rhythm and normal heart sounds.   No murmur heard.  Pulmonary/Chest: Effort normal and breath sounds normal. No respiratory distress. She has no wheezes.   Abdominal: Soft. She exhibits no distension. There is no tenderness.   Musculoskeletal: Normal range of motion. She exhibits no edema or tenderness.   Lymphadenopathy:     She has no cervical adenopathy.   Neurological: She is alert and oriented to person, place, and time.   Skin: Skin is warm and dry. No rash noted. No erythema.   Psychiatric: She has a normal mood and affect. Her behavior is normal. Judgment and thought content normal.   Nursing note and vitals reviewed.      Laboratory:   none performed   Assessment:       1. Chronic rhinitis    2. Simple chronic conjunctivitis of both eyes          Plan:       1. Immunocaps today to identify if allergic vs chronic non allergic  2. Continue fluticasone 1-2 SEN daily and cetirizine 10 mg daily  3. Phone review

## 2019-04-17 NOTE — LETTER
April 17, 2019      IVIS Taylor MD  [address] [facility] [address] [place] [address]           [facility]  [address] [facility] [address] [place] [address]  Phone: [phone]          Patient: Sandhya Madrigal   MR Number: [id number]   YOB: 1945   Date of Visit: 4/17/2019       Dear Dr. IVIS Taylor:    Thank you for referring Sandhya Madrigal to me for evaluation. Attached you will find relevant portions of my assessment and plan of care.    If you have questions, please do not hesitate to call me. I look forward to following Sandhya Madrigal along with you.    Sincerely,    Lu Lewis MD    Enclosure  CC:  No Recipients    If you would like to receive this communication electronically, please contact [email or web address] or [phone] to request more information on [employer] access.    For providers and/or their staff who would like to refer a patient to [facility], please contact us through our one-stop-shop provider referral line, [facility], at [phone].    If you feel you have received this communication in error or would no longer like to receive these types of communications, please e-mail [email or web address]

## 2019-04-19 LAB
A ALTERNATA IGE QN: <0.35 KU/L
A FUMIGATUS IGE QN: <0.35 KU/L
ALLERGEN CHAETOMIUM GLOBOSUM IGE: <0.35 KU/L
ALLERGEN WALNUT TREE IGE: <0.35 KU/L
ALLERGEN WHITE PINE TREE IGE: <0.35 KU/L
BAHIA GRASS IGE QN: 2.63 KU/L
BALD CYPRESS IGE QN: 1.11 KU/L
BERMUDA GRASS IGE QN: 1.48 KU/L
C HERBARUM IGE QN: <0.35 KU/L
C LUNATA IGE QN: <0.35 KU/L
CAT DANDER IGE QN: 1.3 KU/L
CHAETOMIUM GLOB. CLASS: NORMAL
COMMON RAGWEED IGE QN: 7.88 KU/L
COTTONWOOD IGE QN: <0.35 KU/L
D FARINAE IGE QN: <0.35 KU/L
D PTERONYSS IGE QN: <0.35 KU/L
DEPRECATED A ALTERNATA IGE RAST QL: NORMAL
DEPRECATED A FUMIGATUS IGE RAST QL: NORMAL
DEPRECATED BAHIA GRASS IGE RAST QL: ABNORMAL
DEPRECATED BALD CYPRESS IGE RAST QL: ABNORMAL
DEPRECATED BERMUDA GRASS IGE RAST QL: ABNORMAL
DEPRECATED C HERBARUM IGE RAST QL: NORMAL
DEPRECATED C LUNATA IGE RAST QL: NORMAL
DEPRECATED CAT DANDER IGE RAST QL: ABNORMAL
DEPRECATED COMMON RAGWEED IGE RAST QL: ABNORMAL
DEPRECATED COTTONWOOD IGE RAST QL: NORMAL
DEPRECATED D FARINAE IGE RAST QL: NORMAL
DEPRECATED D PTERONYSS IGE RAST QL: NORMAL
DEPRECATED DOG DANDER IGE RAST QL: NORMAL
DEPRECATED ELDER IGE RAST QL: ABNORMAL
DEPRECATED ENGL PLANTAIN IGE RAST QL: NORMAL
DEPRECATED JOHNSON GRASS IGE RAST QL: ABNORMAL
DEPRECATED LONDON PLANE IGE RAST QL: NORMAL
DEPRECATED MUGWORT IGE RAST QL: ABNORMAL
DEPRECATED P NOTATUM IGE RAST QL: NORMAL
DEPRECATED PECAN/HICK TREE IGE RAST QL: ABNORMAL
DEPRECATED ROACH IGE RAST QL: NORMAL
DEPRECATED S ROSTRATA IGE RAST QL: NORMAL
DEPRECATED SALTWORT IGE RAST QL: NORMAL
DEPRECATED SILVER BIRCH IGE RAST QL: NORMAL
DEPRECATED TIMOTHY IGE RAST QL: ABNORMAL
DEPRECATED WEST RAGWEED IGE RAST QL: ABNORMAL
DEPRECATED WHITE OAK IGE RAST QL: NORMAL
DEPRECATED WILLOW IGE RAST QL: NORMAL
DOG DANDER IGE QN: <0.35 KU/L
ELDER IGE QN: 0.68 KU/L
ENGL PLANTAIN IGE QN: <0.35 KU/L
JOHNSON GRASS IGE QN: 2.19 KU/L
LONDON PLANE IGE QN: <0.35 KU/L
MUGWORT IGE QN: 0.92 KU/L
P NOTATUM IGE QN: <0.35 KU/L
PECAN/HICK TREE IGE QN: 1.7 KU/L
ROACH IGE QN: <0.35 KU/L
S ROSTRATA IGE QN: <0.35 KU/L
SALTWORT IGE QN: <0.35 KU/L
SILVER BIRCH IGE QN: <0.35 KU/L
TIMOTHY IGE QN: 2.09 KU/L
WALNUT TREE CLASS: NORMAL
WEST RAGWEED IGE QN: 5.46 KU/L
WHITE OAK IGE QN: <0.35 KU/L
WHITE PINE CLASS: NORMAL
WILLOW IGE QN: <0.35 KU/L

## 2019-04-24 ENCOUNTER — PATIENT MESSAGE (OUTPATIENT)
Dept: ALLERGY | Facility: CLINIC | Age: 74
End: 2019-04-24

## 2019-04-29 ENCOUNTER — PATIENT MESSAGE (OUTPATIENT)
Dept: FAMILY MEDICINE | Facility: CLINIC | Age: 74
End: 2019-04-29

## 2019-05-01 ENCOUNTER — PROCEDURE VISIT (OUTPATIENT)
Dept: VASCULAR SURGERY | Facility: CLINIC | Age: 74
End: 2019-05-01
Payer: MEDICARE

## 2019-05-01 DIAGNOSIS — I83.813 VARICOSE VEINS OF BOTH LOWER EXTREMITIES WITH PAIN: Primary | ICD-10-CM

## 2019-05-01 PROCEDURE — 36471 PR INJECTION THERAPY VEIN,MULT VEINS: ICD-10-PCS | Mod: 50,S$PBB,, | Performed by: THORACIC SURGERY (CARDIOTHORACIC VASCULAR SURGERY)

## 2019-05-01 PROCEDURE — 36471 NJX SCLRSNT MLT INCMPTNT VN: CPT | Mod: 50,S$PBB,, | Performed by: THORACIC SURGERY (CARDIOTHORACIC VASCULAR SURGERY)

## 2019-05-01 PROCEDURE — 36471 NJX SCLRSNT MLT INCMPTNT VN: CPT | Mod: 50,PBBFAC,PO | Performed by: THORACIC SURGERY (CARDIOTHORACIC VASCULAR SURGERY)

## 2019-05-01 NOTE — H&P
The patient is a 72-year-old   female who has some painful varicose veins on bilateral lower extremities that   she has had for several years.  She has worn compression stockings in the past   without much help.  She denies any significant edema.  An ultrasound showed   venous insufficiency of the right lesser saphenous vein.     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     PAST SURGICAL HISTORY:  Colonoscopy, dental implant surgery, breast biopsy.     ALLERGIES:  Sulfa, penicillin, rifampin.     MEDICATIONS:  Zyrtec, Flonase, Synthroid.     FAMILY HISTORY:  Breast cancer, melanoma, alcohol abuse.     SOCIAL HISTORY:  The patient does not smoke and never smoked cigarettes.  She   drinks alcohol occasionally.     REVIEW OF SYSTEMS:  She complains of painful varicose veins of bilateral lower   extremities.  All other systems are reviewed and are negative.     PHYSICAL EXAMINATION:  VITAL SIGNS:  Blood pressure is 144/84, respiratory rate is 18, heart rate 53,   height 5 feet 7 inches, weight 142 pounds.  GENERAL:  She is awake and alert, in no apparent distress.  HEENT:  Normocephalic.  Pupils are equal, round, and reactive.  Sclerae are   anicteric.  NECK:  Supple.  Trachea midline.  No masses.  LUNGS:  Clear.  HEART:  Has a regular rate and rhythm.  ABDOMEN:  Soft and nontender.  No masses.  Bowel sounds are present.  EXTREMITIES:  She has varicose and reticular veins of bilateral lower   extremities.  She has trace edema of bilateral lower extremities.  Bilateral   feet are pink and warm with good capillary refill.  NEUROLOGIC:  Awake, alert and oriented.  No lateralizing neurologic deficits.     Ultrasound of the veins of the lower extremities showed venous insufficiency of   the right lesser saphenous vein with reflux time of 7564 milliseconds.     IMPRESSION:  1.  Venous insufficiency of the right lesser saphenous  vein.  2.  Varicose veins with pain of bilateral lower extremities.     RECOMMENDATIONS:  The patient has venous insufficiency of the right lesser   saphenous vein with very high reflux times.  However, she does not have much   edema and her painful varicose veins are in her thighs.  We talked about   endovenous laser ablation of the right lesser saphenous vein, which is 4 mm in   diameter.  I recommended not doing the endovenous laser ablation at this time,   and to continue wearing compression stockings and elevate the legs as much as   possible.  I did recommend that we proceed with Veinlite-guided sclerotherapy of   her painful varicose veins.  Risks including hyperpigmentation, ulceration, and   DVT, amongst others were discussed.  She voiced understanding and wishes to   proceed. We will go ahead with sclerotherapy today.

## 2019-05-08 NOTE — PROCEDURES
DATE OF PROCEDURE:  05/01/2019    PREOPERATIVE DIAGNOSIS:  Varicose veins with pain and edema of bilateral lower   extremities.    POSTOPERATIVE DIAGNOSIS:  Varicose veins with pain and edema of bilateral lower   extremities.    OPERATION:  Veinlite-guided sclerotherapy of varicose veins of bilateral lower   extremity.    SURGEON:  Vishnu Martinez M.D., F.A.C.S.    PROCEDURE IN DETAIL:  With the patient in the supine position on the procedure   table in the procedure room in the clinic, the anterior, lateral, and medial   aspects of bilateral lower extremities were prepped with alcohol and then   treated with Veinlite-guided sclerotherapy.  The patient was then placed in the   prone position and the posterior aspects of bilateral lower extremities were   treated in a similar fashion.  The sclerotherapy solution used was 0.5%   polidocanol.  A total of 10 mL of this solution was injected into multiple veins   of bilateral lower extremities using a 30-gauge needle attached to a 3 mL   syringe.  Compression dressing was applied to the lower extremity.  The patient   tolerated the procedure and left the procedure room in satisfactory and stable   condition.      DARIAN  dd: 05/01/2019 17:45:31 (CDT)  td: 05/02/2019 02:15:35 (CDT)  Doc ID   #[id number]  Job ID #[id number]    CC:

## 2019-05-22 ENCOUNTER — OFFICE VISIT (OUTPATIENT)
Dept: VASCULAR SURGERY | Facility: CLINIC | Age: 74
End: 2019-05-22
Payer: MEDICARE

## 2019-05-22 VITALS
WEIGHT: 141 LBS | HEART RATE: 55 BPM | HEIGHT: 67 IN | SYSTOLIC BLOOD PRESSURE: 135 MMHG | BODY MASS INDEX: 22.13 KG/M2 | DIASTOLIC BLOOD PRESSURE: 81 MMHG

## 2019-05-22 DIAGNOSIS — I83.813 VARICOSE VEINS OF BOTH LOWER EXTREMITIES WITH PAIN: Primary | ICD-10-CM

## 2019-05-22 PROCEDURE — 99213 PR OFFICE/OUTPT VISIT, EST, LEVL III, 20-29 MIN: ICD-10-PCS | Mod: S$PBB,,, | Performed by: THORACIC SURGERY (CARDIOTHORACIC VASCULAR SURGERY)

## 2019-05-22 PROCEDURE — 99999 PR PBB SHADOW E&M-EST. PATIENT-LVL III: CPT | Mod: PBBFAC,,, | Performed by: THORACIC SURGERY (CARDIOTHORACIC VASCULAR SURGERY)

## 2019-05-22 PROCEDURE — 99999 PR PBB SHADOW E&M-EST. PATIENT-LVL III: ICD-10-PCS | Mod: PBBFAC,,, | Performed by: THORACIC SURGERY (CARDIOTHORACIC VASCULAR SURGERY)

## 2019-05-22 PROCEDURE — 99213 OFFICE O/P EST LOW 20 MIN: CPT | Mod: PBBFAC,PO | Performed by: THORACIC SURGERY (CARDIOTHORACIC VASCULAR SURGERY)

## 2019-05-22 PROCEDURE — 99213 OFFICE O/P EST LOW 20 MIN: CPT | Mod: S$PBB,,, | Performed by: THORACIC SURGERY (CARDIOTHORACIC VASCULAR SURGERY)

## 2019-05-22 NOTE — PROGRESS NOTES
OFFICE VISIT NOTE    Ms. Madrigal underwent Veinlite-guided sclerotherapy about two weeks ago.  She   states that some areas are still somewhat tender.  Overall, some veins have   disappeared already, but others are still present and with thrombus in them.    On physical examination, there are some thrombosed reticular and varicose veins   on bilateral lower extremities.  Some veins are still patent.  Bilateral feet   are pink and warm.  There are no ulcers.  We will reevaluate in three months to   see whether she needs another session of sclerotherapy.      DARIAN  dd: 05/22/2019 18:47:19 (CDT)  td: 05/23/2019 00:19:29 (CDT)  Doc ID   #[id number]  Job ID #[id number]    CC:

## 2019-05-24 ENCOUNTER — PATIENT MESSAGE (OUTPATIENT)
Dept: VASCULAR SURGERY | Facility: CLINIC | Age: 74
End: 2019-05-24

## 2019-06-06 ENCOUNTER — PATIENT MESSAGE (OUTPATIENT)
Dept: FAMILY MEDICINE | Facility: CLINIC | Age: 74
End: 2019-06-06

## 2019-06-06 DIAGNOSIS — E03.9 HYPOTHYROIDISM (ACQUIRED): ICD-10-CM

## 2019-06-06 DIAGNOSIS — Z00.00 ENCOUNTER FOR PREVENTIVE HEALTH EXAMINATION: ICD-10-CM

## 2019-06-06 RX ORDER — LEVOTHYROXINE SODIUM 75 UG/1
75 TABLET ORAL
Qty: 90 TABLET | Refills: 2 | Status: SHIPPED | OUTPATIENT
Start: 2019-06-06 | End: 2020-03-04 | Stop reason: SDUPTHER

## 2019-07-07 ENCOUNTER — PATIENT MESSAGE (OUTPATIENT)
Dept: FAMILY MEDICINE | Facility: CLINIC | Age: 74
End: 2019-07-07

## 2019-08-16 ENCOUNTER — LAB VISIT (OUTPATIENT)
Dept: LAB | Facility: HOSPITAL | Age: 74
End: 2019-08-16
Attending: FAMILY MEDICINE
Payer: MEDICARE

## 2019-08-16 DIAGNOSIS — R79.89 HIGH SERUM PARATHYROID HORMONE (PTH): ICD-10-CM

## 2019-08-16 LAB
CALCIUM SERPL-MCNC: 9.8 MG/DL (ref 8.7–10.5)
PTH-INTACT SERPL-MCNC: 141 PG/ML (ref 9–77)

## 2019-08-16 PROCEDURE — 83970 ASSAY OF PARATHORMONE: CPT

## 2019-08-16 PROCEDURE — 36415 COLL VENOUS BLD VENIPUNCTURE: CPT | Mod: PO

## 2019-08-16 PROCEDURE — 82310 ASSAY OF CALCIUM: CPT

## 2019-08-18 ENCOUNTER — PATIENT MESSAGE (OUTPATIENT)
Dept: FAMILY MEDICINE | Facility: CLINIC | Age: 74
End: 2019-08-18

## 2019-08-20 ENCOUNTER — OFFICE VISIT (OUTPATIENT)
Dept: DERMATOLOGY | Facility: CLINIC | Age: 74
End: 2019-08-20
Payer: MEDICARE

## 2019-08-20 VITALS — RESPIRATION RATE: 18 BRPM | BODY MASS INDEX: 22.15 KG/M2 | HEIGHT: 67 IN | WEIGHT: 141.13 LBS

## 2019-08-20 DIAGNOSIS — L82.1 SEBORRHEIC KERATOSIS: ICD-10-CM

## 2019-08-20 DIAGNOSIS — D48.5 NEOPLASM OF UNCERTAIN BEHAVIOR OF SKIN: ICD-10-CM

## 2019-08-20 DIAGNOSIS — Z12.83 SKIN CANCER SCREENING: ICD-10-CM

## 2019-08-20 DIAGNOSIS — L91.8 SKIN TAG: ICD-10-CM

## 2019-08-20 DIAGNOSIS — Z85.828 PERSONAL HISTORY OF OTHER MALIGNANT NEOPLASM OF SKIN: Primary | ICD-10-CM

## 2019-08-20 DIAGNOSIS — L85.3 XEROSIS CUTIS: ICD-10-CM

## 2019-08-20 PROCEDURE — 88305 TISSUE EXAM BY PATHOLOGIST: CPT | Performed by: PATHOLOGY

## 2019-08-20 PROCEDURE — 88342 IMHCHEM/IMCYTCHM 1ST ANTB: CPT | Mod: 26,,, | Performed by: PATHOLOGY

## 2019-08-20 PROCEDURE — 11102 PR TANGENTIAL BIOPSY, SKIN, SINGLE LESION: ICD-10-PCS | Mod: S$PBB,,, | Performed by: DERMATOLOGY

## 2019-08-20 PROCEDURE — 11102 TANGNTL BX SKIN SINGLE LES: CPT | Mod: S$PBB,,, | Performed by: DERMATOLOGY

## 2019-08-20 PROCEDURE — 88305 TISSUE SPECIMEN TO PATHOLOGY, DERMATOLOGY: ICD-10-PCS | Mod: 26,,, | Performed by: PATHOLOGY

## 2019-08-20 PROCEDURE — 99999 PR PBB SHADOW E&M-EST. PATIENT-LVL III: ICD-10-PCS | Mod: PBBFAC,,, | Performed by: DERMATOLOGY

## 2019-08-20 PROCEDURE — 88342 TISSUE SPECIMEN TO PATHOLOGY, DERMATOLOGY: ICD-10-PCS | Mod: 26,,, | Performed by: PATHOLOGY

## 2019-08-20 PROCEDURE — 99214 OFFICE O/P EST MOD 30 MIN: CPT | Mod: 25,S$PBB,, | Performed by: DERMATOLOGY

## 2019-08-20 PROCEDURE — 88305 TISSUE EXAM BY PATHOLOGIST: CPT | Mod: 26,,, | Performed by: PATHOLOGY

## 2019-08-20 PROCEDURE — 11102 TANGNTL BX SKIN SINGLE LES: CPT | Mod: PBBFAC,PO | Performed by: DERMATOLOGY

## 2019-08-20 PROCEDURE — 99213 OFFICE O/P EST LOW 20 MIN: CPT | Mod: PBBFAC,PO,25 | Performed by: DERMATOLOGY

## 2019-08-20 PROCEDURE — 99214 PR OFFICE/OUTPT VISIT, EST, LEVL IV, 30-39 MIN: ICD-10-PCS | Mod: 25,S$PBB,, | Performed by: DERMATOLOGY

## 2019-08-20 PROCEDURE — 99999 PR PBB SHADOW E&M-EST. PATIENT-LVL III: CPT | Mod: PBBFAC,,, | Performed by: DERMATOLOGY

## 2019-08-20 NOTE — PROGRESS NOTES
Subjective:       Patient ID:  Sandhya Madrigal is a 73 y.o. female who presents for   Chief Complaint   Patient presents with    Skin Check     Patient present for FBSC.     C/o lesions to L clavicle, L mid-leg front, left forearm above wrist, L armpit, R back, R back of forearm, Top R arm, navel area x 1-2 years. The patient denies any change, including change in color, increase in size, or spontaneous bleeding, associated with this lesion.  Not currently treating.    yes Phx of NMSC.  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   no Fhx of melanoma. Family hx of throat cancer.    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 Using amlactin    Review of Systems   Constitutional: Negative for fever, chills, weight loss, weight gain and fatigue.   HENT: Negative for congestion and sore throat.    Respiratory: Negative for cough.    Gastrointestinal: Negative for nausea, vomiting and diarrhea.   Skin: Positive for daily sunscreen use, activity-related sunscreen use and wears hat. Negative for itching, rash, dry skin, sensitivity to antibiotic ointment and sensitivity to bandage adhesive.   Hematologic/Lymphatic: Bruises/bleeds easily.   Allergic/Immunologic: Positive for environmental allergies.        Objective:    Physical Exam   Constitutional: She appears well-developed and well-nourished. No distress.   Eyes: Lids are normal.  No conjunctival no injection.   Neurological: She is alert and oriented to person, place, and time. She is not disoriented.   Psychiatric: She has a normal mood and affect.   Skin:   Areas Examined (abnormalities noted in diagram):   Scalp / Hair Palpated and Inspected  Head / Face Inspection Performed  Neck Inspection Performed  Chest / Axilla Inspection Performed  Abdomen Inspection Performed  Genitals /  Buttocks / Groin Inspection Performed  Back Inspection Performed  RUE Inspected  LUE Inspection Performed  RLE Inspected  LLE Inspection Performed  Nails and Digits Inspection Performed                   Diagram Legend     Erythematous scaling macule/papule c/w actinic keratosis       Vascular papule c/w angioma      Pigmented verrucoid papule/plaque c/w seborrheic keratosis      Yellow umbilicated papule c/w sebaceous hyperplasia      Irregularly shaped tan macule c/w lentigo     1-2 mm smooth white papules consistent with Milia      Movable subcutaneous cyst with punctum c/w epidermal inclusion cyst      Subcutaneous movable cyst c/w pilar cyst      Firm pink to brown papule c/w dermatofibroma      Pedunculated fleshy papule(s) c/w skin tag(s)      Evenly pigmented macule c/w junctional nevus     Mildly variegated pigmented, slightly irregular-bordered macule c/w mildly atypical nevus      Flesh colored to evenly pigmented papule c/w intradermal nevus       Pink pearly papule/plaque c/w basal cell carcinoma      Erythematous hyperkeratotic cursted plaque c/w SCC      Surgical scar with no sign of skin cancer recurrence      Open and closed comedones      Inflammatory papules and pustules      Verrucoid papule consistent consistent with wart     Erythematous eczematous patches and plaques     Dystrophic onycholytic nail with subungual debris c/w onychomycosis     Umbilicated papule    Erythematous-base heme-crusted tan verrucoid plaque consistent with inflamed seborrheic keratosis     Erythematous Silvery Scaling Plaque c/w Psoriasis     See annotation      Assessment / Plan:      Pathology Orders:     Normal Orders This Visit    Tissue Specimen To Pathology, Dermatology     Questions:    Directional Terms:  Other(comment)    Clinical Information:  lentigo r/o atypia    Specific Site:  right forearm        Personal history of other malignant neoplasm of skin  Area(s) of previous NMSC evaluated with no signs of  recurrence.    total body skin examination performed today including at least 12 points as noted in physical examination. Suspicious lesions noted.      Skin cancer screening  Area(s) of previous NMSC evaluated with no signs of recurrence.    total body skin examination performed today including at least 12 points as noted in physical examination. Suspicious lesions noted.    Neoplasm of uncertain behavior of skin  -     Tissue Specimen To Pathology, Dermatology  Shave biopsy procedure note:    Shave biopsy performed after verbal consent including risk of infection, scar, recurrence, need for additional treatment of site. Area prepped with alcohol, anesthetized with approximately 1.0cc of 1% lidocaine with epinephrine. Lesional tissue shaved with razor blade. Hemostasis achieved with application of aluminum chloride followed by hyfrecation. No complications. Dressing applied. Wound care explained.      Skin tag  Abdomen  Reassurance given to patient. No treatment is necessary.   Treatment of benign, asymptomatic lesions may be considered cosmetic.    Seborrheic keratosis  Clavicle  These are benign inherited growths without a malignant potential. Reassurance given to patient. No treatment is necessary.     Xerosis cutis  dermend or amlactin, available otc           Follow up in about 1 year (around 8/20/2020).

## 2019-08-21 ENCOUNTER — OFFICE VISIT (OUTPATIENT)
Dept: VASCULAR SURGERY | Facility: CLINIC | Age: 74
End: 2019-08-21
Payer: MEDICARE

## 2019-08-21 VITALS
HEART RATE: 53 BPM | DIASTOLIC BLOOD PRESSURE: 79 MMHG | WEIGHT: 141 LBS | SYSTOLIC BLOOD PRESSURE: 133 MMHG | HEIGHT: 67 IN | BODY MASS INDEX: 22.13 KG/M2

## 2019-08-21 DIAGNOSIS — I83.813 VARICOSE VEINS OF BOTH LOWER EXTREMITIES WITH PAIN: Primary | ICD-10-CM

## 2019-08-21 PROCEDURE — 99999 PR PBB SHADOW E&M-EST. PATIENT-LVL III: ICD-10-PCS | Mod: PBBFAC,,, | Performed by: THORACIC SURGERY (CARDIOTHORACIC VASCULAR SURGERY)

## 2019-08-21 PROCEDURE — 99213 OFFICE O/P EST LOW 20 MIN: CPT | Mod: S$PBB,,, | Performed by: THORACIC SURGERY (CARDIOTHORACIC VASCULAR SURGERY)

## 2019-08-21 PROCEDURE — 99999 PR PBB SHADOW E&M-EST. PATIENT-LVL III: CPT | Mod: PBBFAC,,, | Performed by: THORACIC SURGERY (CARDIOTHORACIC VASCULAR SURGERY)

## 2019-08-21 PROCEDURE — 99213 OFFICE O/P EST LOW 20 MIN: CPT | Mod: PBBFAC,PO | Performed by: THORACIC SURGERY (CARDIOTHORACIC VASCULAR SURGERY)

## 2019-08-21 PROCEDURE — 99213 PR OFFICE/OUTPT VISIT, EST, LEVL III, 20-29 MIN: ICD-10-PCS | Mod: S$PBB,,, | Performed by: THORACIC SURGERY (CARDIOTHORACIC VASCULAR SURGERY)

## 2019-08-21 NOTE — PROGRESS NOTES
OFFICE VISIT NOTE    Ms. Madrigal underwent Veinlite-guided sclerotherapy of bilateral lower extremity   veins.  She states that the legs are feeling much better than prior to the   procedure.  She does have some indurated areas and some areas are .    PAST MEDICAL HISTORY:  Unchanged.    PHYSICAL EXAMINATION:  VITAL SIGNS:  Blood pressure is 133/79, heart rate is 53, height 5 feet 7   inches, weight 141 pounds, respiratory rate is 18.  GENERAL:  She is awake and alert, in no apparent distress.  HEENT:  Head is normocephalic.  Pupils are equal, round, reactive.  Sclerae are   anicteric.  NECK:  Supple.  Trachea midline.  LUNGS:  Clear.  HEART:  Has a regular rate and rhythm.  ABDOMEN:  Soft and nontender.  EXTREMITIES:  Most of the veins that were treated with Veinlite-guided   sclerotherapy have closed off.  There are some areas that are indurated with   thrombus within them.  She was reassured about this.  Some of the veins did not   close off.    RECOMMENDATIONS:  I think that the patient will need at least one more session   of sclerotherapy, but I would like to wait at least eight more weeks for some of   the veins that have closed off to clear up.  This was discussed with her and   she agrees.      DARIAN  dd: 08/21/2019 16:54:27 (CDT)  td: 08/22/2019 06:50:08 (CDT)  Doc ID   #[id number]  Job ID #[id number]    CC:

## 2019-08-24 ENCOUNTER — PATIENT MESSAGE (OUTPATIENT)
Dept: FAMILY MEDICINE | Facility: CLINIC | Age: 74
End: 2019-08-24

## 2019-08-26 DIAGNOSIS — J30.9 ALLERGIC RHINITIS: ICD-10-CM

## 2019-08-26 RX ORDER — FLUTICASONE PROPIONATE 50 MCG
2 SPRAY, SUSPENSION (ML) NASAL DAILY
Qty: 1 BOTTLE | Refills: 3 | Status: SHIPPED | OUTPATIENT
Start: 2019-08-26 | End: 2020-08-28 | Stop reason: SDUPTHER

## 2019-09-12 ENCOUNTER — OFFICE VISIT (OUTPATIENT)
Dept: FAMILY MEDICINE | Facility: CLINIC | Age: 74
End: 2019-09-12
Payer: MEDICARE

## 2019-09-12 ENCOUNTER — IMMUNIZATION (OUTPATIENT)
Dept: FAMILY MEDICINE | Facility: CLINIC | Age: 74
End: 2019-09-12
Payer: MEDICARE

## 2019-09-12 VITALS
HEIGHT: 67 IN | WEIGHT: 142.19 LBS | SYSTOLIC BLOOD PRESSURE: 133 MMHG | BODY MASS INDEX: 22.32 KG/M2 | DIASTOLIC BLOOD PRESSURE: 68 MMHG | HEART RATE: 60 BPM

## 2019-09-12 DIAGNOSIS — R79.89 ELEVATED PTHRP LEVEL: ICD-10-CM

## 2019-09-12 DIAGNOSIS — R19.4 CHANGE IN BOWEL HABITS: ICD-10-CM

## 2019-09-12 DIAGNOSIS — K40.90 NON-RECURRENT UNILATERAL INGUINAL HERNIA WITHOUT OBSTRUCTION OR GANGRENE: ICD-10-CM

## 2019-09-12 DIAGNOSIS — K21.9 GASTROESOPHAGEAL REFLUX DISEASE, ESOPHAGITIS PRESENCE NOT SPECIFIED: Primary | ICD-10-CM

## 2019-09-12 PROCEDURE — 99999 PR PBB SHADOW E&M-EST. PATIENT-LVL III: CPT | Mod: PBBFAC,,, | Performed by: FAMILY MEDICINE

## 2019-09-12 PROCEDURE — 99213 OFFICE O/P EST LOW 20 MIN: CPT | Mod: PBBFAC,PO | Performed by: FAMILY MEDICINE

## 2019-09-12 PROCEDURE — 99214 OFFICE O/P EST MOD 30 MIN: CPT | Mod: S$PBB,,, | Performed by: FAMILY MEDICINE

## 2019-09-12 PROCEDURE — 99214 PR OFFICE/OUTPT VISIT, EST, LEVL IV, 30-39 MIN: ICD-10-PCS | Mod: S$PBB,,, | Performed by: FAMILY MEDICINE

## 2019-09-12 PROCEDURE — 90662 IIV NO PRSV INCREASED AG IM: CPT | Mod: PBBFAC,PO

## 2019-09-12 PROCEDURE — 99999 PR PBB SHADOW E&M-EST. PATIENT-LVL III: ICD-10-PCS | Mod: PBBFAC,,, | Performed by: FAMILY MEDICINE

## 2019-09-12 NOTE — PROGRESS NOTES
"Subjective:       Patient ID: Sandhya Madrigal is a 73 y.o. female.    Chief Complaint: Follow-up (6 month~pt c/o swelling groin area )    Pt is known to me.  The pt feels she has some swelling of her right groin.  She has no pain--she just thinks it looks different from the other side.  The pt reports that she stopped her GERD medications because they made her feel sick.  She still feels some burning in her throat.  She has had some vomiting--3 episodes over the last several weeks.  She occasionally has urgent bowel movements.  The pt recently had a skin biopsy per Dr. Aldrich.  Path report shows solar lentigo.    Review of Systems   Constitutional: Negative for activity change and unexpected weight change.   HENT: Negative for hearing loss, rhinorrhea and trouble swallowing.    Eyes: Positive for visual disturbance. Negative for discharge.   Respiratory: Negative for chest tightness and wheezing.    Cardiovascular: Negative for chest pain and palpitations.   Gastrointestinal: Positive for diarrhea and vomiting. Negative for abdominal pain, blood in stool and constipation.        GERD   Endocrine: Negative for polydipsia and polyuria.   Genitourinary: Negative for difficulty urinating, dysuria, hematuria and menstrual problem.   Musculoskeletal: Negative for arthralgias, joint swelling and neck pain.   Neurological: Negative for weakness and headaches.   Psychiatric/Behavioral: Negative for confusion and dysphoric mood.       Objective:       Vitals:    09/12/19 1101   BP: 133/68   BP Location: Right arm   Patient Position: Sitting   BP Method: Medium (Manual)   Pulse: 60   Weight: 64.5 kg (142 lb 3.2 oz)   Height: 5' 7" (1.702 m)     Physical Exam   Constitutional: She is oriented to person, place, and time. She appears well-developed and well-nourished.   HENT:   Head: Normocephalic.   Eyes: Pupils are equal, round, and reactive to light. Conjunctivae and EOM are normal.   Neck: Normal range of motion. Neck " supple. No thyromegaly present.   Cardiovascular: Normal rate, regular rhythm and normal heart sounds.   Pulmonary/Chest: Effort normal and breath sounds normal.   Abdominal: Soft. Bowel sounds are normal. There is no tenderness. A hernia (right inguinal nernia) is present.   Musculoskeletal: Normal range of motion. She exhibits no tenderness or deformity.   Lymphadenopathy:     She has no cervical adenopathy.   Neurological: She is alert and oriented to person, place, and time. She displays normal reflexes. No cranial nerve deficit. She exhibits normal muscle tone. Coordination normal.   Skin: Skin is warm and dry.   Psychiatric: She has a normal mood and affect. Her behavior is normal.       Assessment:       1. Gastroesophageal reflux disease, esophagitis presence not specified    2. Change in bowel habits    3. Non-recurrent unilateral inguinal hernia without obstruction or gangrene    4. Elevated PTHrP level        Plan:       Sandhya was seen today for follow-up.    Diagnoses and all orders for this visit:    Gastroesophageal reflux disease, esophagitis presence not specified  -     Ambulatory consult to Gastroenterology    Change in bowel habits  -     Ambulatory consult to Gastroenterology    Non-recurrent unilateral inguinal hernia without obstruction or gangrene  -     Ambulatory consult to General Surgery    Elevated PTHrP level  -     PTH, intact; Future  -     Calcium; Future      During this visit, I reviewed the pt's history, medications, allergies, and problem list.

## 2019-09-13 ENCOUNTER — PATIENT MESSAGE (OUTPATIENT)
Dept: SURGERY | Facility: CLINIC | Age: 74
End: 2019-09-13

## 2019-09-26 ENCOUNTER — OFFICE VISIT (OUTPATIENT)
Dept: SURGERY | Facility: CLINIC | Age: 74
End: 2019-09-26
Payer: MEDICARE

## 2019-09-26 VITALS
TEMPERATURE: 99 F | WEIGHT: 144.38 LBS | DIASTOLIC BLOOD PRESSURE: 82 MMHG | HEART RATE: 58 BPM | BODY MASS INDEX: 24.06 KG/M2 | HEIGHT: 65 IN | SYSTOLIC BLOOD PRESSURE: 142 MMHG

## 2019-09-26 DIAGNOSIS — K40.90 NON-RECURRENT UNILATERAL INGUINAL HERNIA WITHOUT OBSTRUCTION OR GANGRENE: Primary | ICD-10-CM

## 2019-09-26 PROCEDURE — 99999 PR PBB SHADOW E&M-EST. PATIENT-LVL III: ICD-10-PCS | Mod: PBBFAC,,, | Performed by: SURGERY

## 2019-09-26 PROCEDURE — 99999 PR PBB SHADOW E&M-EST. PATIENT-LVL III: CPT | Mod: PBBFAC,,, | Performed by: SURGERY

## 2019-09-26 PROCEDURE — 99213 OFFICE O/P EST LOW 20 MIN: CPT | Mod: PBBFAC,PO | Performed by: SURGERY

## 2019-09-26 PROCEDURE — 99204 OFFICE O/P NEW MOD 45 MIN: CPT | Mod: S$PBB,,, | Performed by: SURGERY

## 2019-09-26 PROCEDURE — 99204 PR OFFICE/OUTPT VISIT, NEW, LEVL IV, 45-59 MIN: ICD-10-PCS | Mod: S$PBB,,, | Performed by: SURGERY

## 2019-09-26 NOTE — LETTER
September 27, 2019      IVIS Taylor MD  [address] [facility] [address]  [facility] [address]           [facility]  [address] [place] [address]  Phone: [phone]          Patient: Sandhay Madrigal   MR Number: [id number]   YOB: 1945   Date of Visit: 9/26/2019       Dear Dr. IVIS Taylor:    Thank you for referring Sandhya Madrigal to me for evaluation. Attached you will find relevant portions of my assessment and plan of care.    If you have questions, please do not hesitate to call me. I look forward to following Sandhya Madrigal along with you.    Sincerely,    Kee Willson MD    Enclosure  CC:  No Recipients    If you would like to receive this communication electronically, please contact [email or web address] or [phone] to request more information on [employer] Link access.    For providers and/or their staff who would like to refer a patient to [facility], please contact us through our one-stop-shop provider referral line, [facility], at [phone].    If you feel you have received this communication in error or would no longer like to receive these types of communications, please e-mail [email or web address]

## 2019-09-27 NOTE — PROGRESS NOTES
Subjective:       Patient ID: Sandhya Madrigal is a 73 y.o. female.    Chief Complaint: Consult ([place])    HPI  Pleasant 74 yo F referred layla silvestre in consultation for evaluation of inguinal hernia. Pt notes that she has noticed a slight bulge in her R groin.  She denies any pain or discomfort from this.  Notes that she can push it easily.  She does states that she has limited her activity some out of fear.  Pt is otherwise without complaint.  She lives an active lifestyle.  No significant PMhx no significant PShx.     Review of Systems   Constitutional: Negative for activity change, appetite change, fever and unexpected weight change.   HENT: Negative for congestion and hearing loss.    Respiratory: Negative for chest tightness, shortness of breath and wheezing.    Cardiovascular: Negative for chest pain.   Gastrointestinal: Negative for abdominal distention, abdominal pain, constipation, diarrhea, nausea and vomiting.   Genitourinary: Negative for difficulty urinating, dysuria and frequency.   Skin: Negative for color change and wound.   Neurological: Negative for dizziness.   Hematological: Negative for adenopathy. Does not bruise/bleed easily.   Psychiatric/Behavioral: Negative for agitation and decreased concentration.       Objective:      Physical Exam   Constitutional: She is oriented to person, place, and time. She appears well-developed and well-nourished.   HENT:   Head: Normocephalic and atraumatic.   Eyes: Pupils are equal, round, and reactive to light. Right eye exhibits no discharge. Left eye exhibits no discharge. No scleral icterus.   Neck: Normal range of motion. Neck supple. No tracheal deviation present. No thyromegaly present.   Cardiovascular: Normal rate, regular rhythm and normal heart sounds. Exam reveals no gallop.   No murmur heard.  Pulmonary/Chest: Effort normal and breath sounds normal. She exhibits no tenderness.   Abdominal: Soft. Bowel sounds are normal. She exhibits no distension, no  abdominal bruit, no pulsatile midline mass and no mass. There is no hepatosplenomegaly. There is no tenderness. There is no rigidity, no rebound, no guarding, no tenderness at McBurney's point and negative Lebron's sign. A hernia is present. Hernia confirmed positive in the right inguinal area. Hernia confirmed negative in the ventral area.       Genitourinary: Rectum normal.   Musculoskeletal: Normal range of motion. She exhibits no edema, tenderness or deformity.   Lymphadenopathy:     She has no cervical adenopathy.   Neurological: She is alert and oriented to person, place, and time.   Skin: Skin is warm. No rash noted. No erythema. No pallor.   Psychiatric: She has a normal mood and affect. Her behavior is normal.   Vitals reviewed.      Assessment:     Inguinal hernia  No diagnosis found.    Plan:       D/w pt.  Her hernia is very easily reducible.  D/w her that if asymptomatic and not associated with pain would recommend observation.  Assured pt that I encourage continuing all activities and if those activities lead to pain would consider surgical repair.  She prefers conservative approach at present.  She will f/u with me prn

## 2019-10-03 ENCOUNTER — TELEPHONE (OUTPATIENT)
Dept: VASCULAR SURGERY | Facility: CLINIC | Age: 74
End: 2019-10-03

## 2019-10-03 NOTE — TELEPHONE ENCOUNTER
----- Message from Rossy Rader sent at 10/3/2019 12:19 PM CDT -----  Contact: self    Pt cancelled procedure... Needs to be rescheduled .. Could not come in the original day,, please call.. Thanks

## 2019-10-03 NOTE — TELEPHONE ENCOUNTER
Pt requested to cancel procedure and wishes to be scheduled in next year. Will keep her chart available to call when new dates are available. Pt verbalized understanding.

## 2019-11-12 ENCOUNTER — OFFICE VISIT (OUTPATIENT)
Dept: GASTROENTEROLOGY | Facility: CLINIC | Age: 74
End: 2019-11-12
Payer: MEDICARE

## 2019-11-12 VITALS
SYSTOLIC BLOOD PRESSURE: 158 MMHG | RESPIRATION RATE: 22 BRPM | DIASTOLIC BLOOD PRESSURE: 68 MMHG | HEART RATE: 57 BPM | WEIGHT: 147.06 LBS | HEIGHT: 65 IN | BODY MASS INDEX: 24.5 KG/M2

## 2019-11-12 DIAGNOSIS — K21.9 GASTROESOPHAGEAL REFLUX DISEASE, ESOPHAGITIS PRESENCE NOT SPECIFIED: Primary | ICD-10-CM

## 2019-11-12 PROCEDURE — 99999 PR PBB SHADOW E&M-EST. PATIENT-LVL IV: ICD-10-PCS | Mod: PBBFAC,,, | Performed by: INTERNAL MEDICINE

## 2019-11-12 PROCEDURE — 99213 PR OFFICE/OUTPT VISIT, EST, LEVL III, 20-29 MIN: ICD-10-PCS | Mod: S$PBB,,, | Performed by: INTERNAL MEDICINE

## 2019-11-12 PROCEDURE — 99213 OFFICE O/P EST LOW 20 MIN: CPT | Mod: S$PBB,,, | Performed by: INTERNAL MEDICINE

## 2019-11-12 PROCEDURE — 99214 OFFICE O/P EST MOD 30 MIN: CPT | Mod: PBBFAC,PO | Performed by: INTERNAL MEDICINE

## 2019-11-12 PROCEDURE — 99999 PR PBB SHADOW E&M-EST. PATIENT-LVL IV: CPT | Mod: PBBFAC,,, | Performed by: INTERNAL MEDICINE

## 2019-11-12 NOTE — LETTER
November 12, 2019      IVIS Taylor MD  [address] [facility] [address] [place] [address]           [facility]  [address] [place] [address]  Phone: [phone]          Patient: Sandhya Madrigal   MR Number: [id number]   YOB: 1945   Date of Visit: 11/12/2019       Dear Dr. IVIS Taylor:    Thank you for referring Sandhya Madrigal to me for evaluation. Attached you will find relevant portions of my assessment and plan of care.    If you have questions, please do not hesitate to call me. I look forward to following Sandhya Madrigal along with you.    Sincerely,    Andrea Jacobs Jr., MD    Enclosure  CC:  No Recipients    If you would like to receive this communication electronically, please contact [email or web address] or [phone] to request more information on [employer] Link access.    For providers and/or their staff who would like to refer a patient to [facility], please contact us through our one-stop-shop provider referral line, [facility], at [phone].    If you feel you have received this communication in error or would no longer like to receive these types of communications, please e-mail [email or web address]

## 2019-11-12 NOTE — PROGRESS NOTES
"Subjective:       Patient ID: Sandhya Madrigal is a 74 y.o. female.    Chief Complaint: No chief complaint on file.    Ms. Madrigal presents today to discuss her possible reflux.  See Dr. Taylor's note from Sept.  And see ENT's note from March.  As per rthose notes, she had a sensation in her throat, and Ms Patten removed the tonsillolith and started feeling better.  She tried the meds for about 2-3 days, but didn't like the way they made her feel.  She saw Dr Taylor in Sept, and was recommended to see us.  She has never had an EGD.  Also of note, she's seems very anxious, very type A.      See Dr. Taylor's recent OV note 2019:  Pt is known to me.  The pt feels she has some swelling of her right groin.  She has no pain--she just thinks it looks different from the other side.  The pt reports that she stopped her GERD medications because they made her feel sick.  She still feels some burning in her throat.  She has had some vomiting--3 episodes over the last several weeks.  She occasionally has urgent bowel movements.  The pt recently had a skin biopsy per Dr. Aldrich.  Path report shows solar lentigo.  Plan:    Sandhya was seen today for follow-up.   Diagnoses and all orders for this visit:   Gastroesophageal reflux disease, esophagitis presence not specified  -     Ambulatory consult to Gastroenterology    See ENT note of 3/25/2019  Chief Complaint: Sore Throat (sensation of lump in throat)   HPI   Patient reports foreign body sensation in her left throat, below her tonsil. She has a h/o tonsilloliths, but this is lower. Feels like a "growth." She is concerned because her mother  of throat cancer. Her mother was a chronic tobacco smoker. Patient smoked a little in high school but none is 50-55 years.   SPECIFIC FINDINGS  Adenoid tissue - normal   Nasopharynx & eustachian tube orifices - normal   Posterior pharyngeal wall - normal   Base of tongue - normal   Epiglottis - normal   Valleculae - normal   Pyriform sinuses " - normal   False vocal cords - normal   True vocal cords - normal  Arytenoids - normal   Interarytenoid space - edema, erythema  Assessment:    Left tonsillolith extracted for patient today with stainless steel curet in office    LPRD/GERD  Plan:    Plastic curet given Sandhya Liang   to Katelyn Patten, NP            3/27/19 2:16 PM   Katelyn Patten, I completely agree that the large tonsil stone you removed likely was my problem, even though it was at tonsil level and my discomfort so clearly seemed to me to be at the base of my throat.  I do not now have the sense of any throat obstruction and am very grateful for your professionalism and help.  I will take your advise on diet, meal size, raising head, and I understand that a follow up with a GI specialist is advisable for some later time.  Because I am so much better now (including being off the 2 meds), I do not anticipate need for a near-term CT scan but I appreciate knowing it is a medical option as needed.  Thanks again for helping me.  Your work has made a great difference in my level of physical comfort and peace of mind.  Great job.  Regards, Sandhya Madrigal    removal at home    Advised/Cautioned: The results of today's ENT exam and flexible endoscopy were detailed to the patient and her questions were answered. Patient education centered around GERD, known exacerbants and contemporary treatment options. Laryngoscope photos were given to the patient. Handouts given on LPRD and GERD were given to the patient. After review of these, patient elected to be placed on PPI 40 mg QAM on an empty stomach for the next 6-8 weeks, and H2-blocker QHS. I encouraged the patient once she has completed the evening meal to not snack or consume any other food products or caffeinated beverages for at least  minutes before retiring. Finally, I encouraged the patient to sleep about 30 degrees above horizontal, and this can be facilitated by using 2-3 pillows or a wedge foam  product. If the patient is not demonstrably improved in 6-8 weeks, consultation with gastroenterology may be indicated to rule out intrinsic disease in the lower esophagus, stomach, or proximal duodenum.   If not better in 3-4 weeks, will proceed with CT STN as discussed today -- pt to call     Sandhya Madrigal  to Katelyn Patten NP       3/27/19 8:17 AM   I have taken your prescribed Zantac on 3/25 and 3/26, each at  8pm.   I have also taken 1 prescribed Prilosec on 3/26 at 8am.  I want to not take either of these going forward, now that I have read more about the possible problems they can cause.  More pressing for me is that I woke up at 4am today with a headache and stomach ache, and I have a little tightness in my chest.  I also feel tired.  It is not like me to have these kind of problems.  I did see your photos where you advise I have acid reflux at my larynx.  I am wondering if it could possibly be that instead of acid reflux it is instead the nose spray (Fluticasone Propionate 50mcg) that I use 1x (and sometimes 2x) every day that could be the irritant.  If I do NOT continue to use Zantac and Prilosec, would we follow up on my throat condition at some later time?     It is usual for me to follow doctor directions as advised, but I do not feel good from these drugs.  Thank you.  Sandhya Patten, ASHLEY  to Sandhya Madrigal       3/27/19 10:22 AM   Ms. Madrigal,   I removed a very large tonsil stone from the left side, which hopefully has improved the sensation you were experiencing.   The thickening around the back of your voice box is caused by acid reflux. You can treat this with elimination of exacerbating items from your diet, smaller meals, raising the head of your bed, etc., but ultimately should follow-up with a GI specialist.   Lastly, if this sensation persists, despite all the measures discussed in your After-Visit Summary, we can offer you a CT scan of your entire neck. This is a  high-definition picture of everything in your neck/throat area to ensure nothing worrisome is going on.   Let me know if it persists so I can put the order in for the CT scan.   Thank you,   Sandhya Ruano  to Katelyn Patten, ASHLEY       3/27/19 2:16 PM   Katelyn Patten, I completely agree that the large tonsil stone you removed likely was my problem, even though it was at tonsil level and my discomfort so clearly seemed to me to be at the base of my throat.  I do not now have the sense of any throat obstruction and am very grateful for your professionalism and help.  I will take your advise on diet, meal size, raising head, and I understand that a follow up with a GI specialist is advisable for some later time.  Because I am so much better now (including being off the 2 meds), I do not anticipate need for a near-term CT scan but I appreciate knowing it is a medical option as needed.  Thanks again for helping me.  Your work has made a great difference in my level of physical comfort and peace of mind.  Great job.  Regards, Sandhya Madrigal             Review of Systems   Constitutional: Negative for appetite change, fever and unexpected weight change.   HENT: Negative.  Negative for mouth sores, sore throat and trouble swallowing.    Eyes: Negative.    Respiratory: Negative.  Negative for cough and choking.    Cardiovascular: Negative.  Negative for chest pain and leg swelling.   Gastrointestinal: Negative for abdominal distention, abdominal pain, anal bleeding, blood in stool, constipation, diarrhea, nausea and vomiting.   Genitourinary: Negative.    Musculoskeletal: Negative.    Skin: Negative.  Negative for color change and rash.   Neurological: Negative.    Hematological: Negative for adenopathy.   Psychiatric/Behavioral: The patient is nervous/anxious and is hyperactive.        Objective:      Physical Exam   Constitutional: She is oriented to person, place, and time. She appears well-developed and  "well-nourished.   HENT:   Mouth/Throat: Oropharynx is clear and moist. No oropharyngeal exudate.   Eyes: No scleral icterus.   Neck: No tracheal deviation present. No thyromegaly present.   Cardiovascular: Normal rate, regular rhythm and normal heart sounds.   Pulmonary/Chest: Effort normal and breath sounds normal.   Abdominal: Soft. Bowel sounds are normal. She exhibits no distension and no mass. There is no tenderness. There is no guarding.   Flat, with normal bowel sounds.  Soft.  Nontender.  No masses, no organomegaly.   Lymphadenopathy:     She has no cervical adenopathy.   Neurological: She is alert and oriented to person, place, and time.   Skin: Skin is warm and dry. No rash noted.   Psychiatric: She has a normal mood and affect.   Nursing note and vitals reviewed.      Assessment:     Gastroesophageal reflux disease, esophagitis presence not specified          Symptoms do suggest GERD, but not too severe.  She wants to try something "PRN"    Plan:        1. Will have her try Pepcid Complete, which she is to chew, 1 or 2, q 6 hrs as needed.   2. Will see how she does with the upcoming holidays.   3. If no better or worsens, would next pursue EGD.   "

## 2019-12-12 ENCOUNTER — LAB VISIT (OUTPATIENT)
Dept: LAB | Facility: HOSPITAL | Age: 74
End: 2019-12-12
Attending: FAMILY MEDICINE
Payer: MEDICARE

## 2019-12-12 DIAGNOSIS — R79.89 ELEVATED PTHRP LEVEL: ICD-10-CM

## 2019-12-12 LAB
CALCIUM SERPL-MCNC: 9.3 MG/DL (ref 8.7–10.5)
PTH-INTACT SERPL-MCNC: 99 PG/ML (ref 9–77)

## 2019-12-12 PROCEDURE — 36415 COLL VENOUS BLD VENIPUNCTURE: CPT | Mod: PO

## 2019-12-12 PROCEDURE — 82310 ASSAY OF CALCIUM: CPT

## 2019-12-12 PROCEDURE — 83970 ASSAY OF PARATHORMONE: CPT

## 2019-12-20 ENCOUNTER — PATIENT MESSAGE (OUTPATIENT)
Dept: FAMILY MEDICINE | Facility: CLINIC | Age: 74
End: 2019-12-20

## 2020-01-08 ENCOUNTER — PES CALL (OUTPATIENT)
Dept: ADMINISTRATIVE | Facility: CLINIC | Age: 75
End: 2020-01-08

## 2020-01-31 ENCOUNTER — PATIENT MESSAGE (OUTPATIENT)
Dept: VASCULAR SURGERY | Facility: CLINIC | Age: 75
End: 2020-01-31

## 2020-02-03 ENCOUNTER — OFFICE VISIT (OUTPATIENT)
Dept: FAMILY MEDICINE | Facility: CLINIC | Age: 75
End: 2020-02-03
Payer: MEDICARE

## 2020-02-03 ENCOUNTER — PES CALL (OUTPATIENT)
Dept: ADMINISTRATIVE | Facility: CLINIC | Age: 75
End: 2020-02-03

## 2020-02-03 ENCOUNTER — PATIENT MESSAGE (OUTPATIENT)
Dept: VASCULAR SURGERY | Facility: CLINIC | Age: 75
End: 2020-02-03

## 2020-02-03 ENCOUNTER — IMMUNIZATION (OUTPATIENT)
Dept: PHARMACY | Facility: CLINIC | Age: 75
End: 2020-02-03
Payer: MEDICARE

## 2020-02-03 VITALS
SYSTOLIC BLOOD PRESSURE: 134 MMHG | HEIGHT: 65 IN | OXYGEN SATURATION: 98 % | BODY MASS INDEX: 24.28 KG/M2 | WEIGHT: 145.75 LBS | DIASTOLIC BLOOD PRESSURE: 74 MMHG | HEART RATE: 56 BPM

## 2020-02-03 DIAGNOSIS — Z00.00 ENCOUNTER FOR PREVENTIVE HEALTH EXAMINATION: Primary | ICD-10-CM

## 2020-02-03 DIAGNOSIS — E03.9 HYPOTHYROIDISM (ACQUIRED): ICD-10-CM

## 2020-02-03 DIAGNOSIS — Z78.0 POSTMENOPAUSAL: ICD-10-CM

## 2020-02-03 DIAGNOSIS — M21.611 BUNION, RIGHT FOOT: ICD-10-CM

## 2020-02-03 DIAGNOSIS — M85.80 OSTEOPENIA, UNSPECIFIED LOCATION: ICD-10-CM

## 2020-02-03 DIAGNOSIS — N18.30 CHRONIC KIDNEY DISEASE, STAGE III (MODERATE): ICD-10-CM

## 2020-02-03 DIAGNOSIS — I83.813 VARICOSE VEINS OF BOTH LOWER EXTREMITIES WITH PAIN: ICD-10-CM

## 2020-02-03 PROCEDURE — G0439 PPPS, SUBSEQ VISIT: HCPCS | Mod: S$GLB,,, | Performed by: NURSE PRACTITIONER

## 2020-02-03 PROCEDURE — G0439 PR MEDICARE ANNUAL WELLNESS SUBSEQUENT VISIT: ICD-10-PCS | Mod: S$GLB,,, | Performed by: NURSE PRACTITIONER

## 2020-02-03 PROCEDURE — 99999 PR PBB SHADOW E&M-EST. PATIENT-LVL V: CPT | Mod: PBBFAC,,, | Performed by: NURSE PRACTITIONER

## 2020-02-03 PROCEDURE — 99215 OFFICE O/P EST HI 40 MIN: CPT | Mod: PBBFAC,PO | Performed by: NURSE PRACTITIONER

## 2020-02-03 PROCEDURE — 99999 PR PBB SHADOW E&M-EST. PATIENT-LVL V: ICD-10-PCS | Mod: PBBFAC,,, | Performed by: NURSE PRACTITIONER

## 2020-02-03 NOTE — PATIENT INSTRUCTIONS
Counseling and Referral of Other Preventative  (Italic type indicates deductible and co-insurance are waived)    Patient Name: Sandhya Madrigal  Today's Date: 2/3/2020    Health Maintenance       Date Due Completion Date    Shingles Vaccine (2 of 3) 10/13/2009 8/18/2009    DEXA SCAN 03/13/2020 3/13/2017    Override on 12/16/2014: Done    Mammogram 06/17/2020 6/17/2019    Override on 12/16/2014: Done    Override on 5/6/2014: Done    Lipid Panel 03/11/2024 3/11/2019    Override on 11/24/2014: Done    Colonoscopy 05/12/2025 5/12/2017    Override on 2/15/2007: Done    TETANUS VACCINE 03/14/2028 3/14/2018        No orders of the defined types were placed in this encounter.    The following information is provided to all patients.  This information is to help you find resources for any of the problems found today that may be affecting your health:                Living healthy guide: www.[facility].[place].[facility]      Understanding Diabetes: www.diabetes.org      Eating healthy: www.cdc.gov/healthyweight      [facility] home safety checklist: www.cdc.gov/steadi/patient.html      Agency on Aging: www.goea.[place].gov      Alcoholics anonymous (AA): www.aa.org      Physical Activity: www.june.nih.gov/dl4brrx      Tobacco use: www.quitwithusla.org

## 2020-02-03 NOTE — PROGRESS NOTES
"Sandhya Madrigal presented for a  Medicare AWV and comprehensive Health Risk Assessment today. The following components were reviewed and updated:    · Medical history  · Family History  · Social history  · Allergies and Current Medications  · Health Risk Assessment  · Health Maintenance  · Care Team     ** See Completed Assessments for Annual Wellness Visit within the encounter summary.**     The following assessments were completed:  · Living Situation  · CAGE  · Depression Screening  · Timed Get Up and Go  · Whisper Test  · Cognitive Function Screening      · Nutrition Screening  · ADL Screening  · PAQ Screening    Vitals:    02/03/20 1200   BP: 134/74   BP Location: Left arm   Patient Position: Sitting   BP Method: Medium (Manual)   Pulse: (!) 56   SpO2: 98%   Weight: 66.1 kg (145 lb 11.6 oz)   Height: 5' 4.5" (1.638 m)     Body mass index is 24.63 kg/m².  Physical Exam   Constitutional: She is oriented to person, place, and time. She appears well-nourished.   Cardiovascular: Normal rate, regular rhythm, normal heart sounds and intact distal pulses.   Pulmonary/Chest: Effort normal and breath sounds normal.   Neurological: She is alert and oriented to person, place, and time.   Skin: Skin is warm and dry.   Vitals reviewed.      Diagnoses and health risks identified today and associated recommendations/orders:    1. Encounter for preventive health examination  Reviewed and discussed health maintenance.    - DXA Bone Density Spine And Hip; Future    2. Varicose veins of both lower extremities with pain  Stable- continue current treatment and follow up routinely with PCP     3. Chronic kidney disease, stage III (moderate)  Stable- continue current treatment and follow up routinely with PCP   Labs reviewed. Avoid NSAIDs and increase fluids    4. Osteopenia, unspecified location  - DXA Bone Density Spine And Hip; Future    5. Postmenopausal  - DXA Bone Density Spine And Hip; Future    6. Bunion, right foot  - Ambulatory " referral to Podiatry    7. Hypothyroidism (acquired)  Stable- continue current treatment and follow up routinely with PCP     Provided Sandhya with a 5-10 year written screening schedule and personal prevention plan. Recommendations were developed using the USPSTF age appropriate recommendations. Education, counseling, and referrals were provided as needed. After Visit Summary printed and given to patient which includes a list of additional screenings\tests needed.    Sabiha Webb NP

## 2020-02-12 ENCOUNTER — PROCEDURE VISIT (OUTPATIENT)
Dept: VASCULAR SURGERY | Facility: CLINIC | Age: 75
End: 2020-02-12
Payer: MEDICARE

## 2020-02-12 VITALS
HEIGHT: 65 IN | SYSTOLIC BLOOD PRESSURE: 136 MMHG | DIASTOLIC BLOOD PRESSURE: 78 MMHG | BODY MASS INDEX: 24.28 KG/M2 | WEIGHT: 145.75 LBS | HEART RATE: 68 BPM

## 2020-02-12 DIAGNOSIS — I83.813 VARICOSE VEINS OF BOTH LOWER EXTREMITIES WITH PAIN: Primary | ICD-10-CM

## 2020-02-12 PROCEDURE — 36471 NJX SCLRSNT MLT INCMPTNT VN: CPT | Mod: 50,S$PBB,, | Performed by: THORACIC SURGERY (CARDIOTHORACIC VASCULAR SURGERY)

## 2020-02-12 PROCEDURE — 36471 NJX SCLRSNT MLT INCMPTNT VN: CPT | Mod: 50,PBBFAC,PO | Performed by: THORACIC SURGERY (CARDIOTHORACIC VASCULAR SURGERY)

## 2020-02-12 PROCEDURE — 36471 PR INJECTION THERAPY VEIN,MULT VEINS: ICD-10-PCS | Mod: 50,S$PBB,, | Performed by: THORACIC SURGERY (CARDIOTHORACIC VASCULAR SURGERY)

## 2020-02-12 NOTE — PROCEDURES
Date of service:  02/12/2020    Preoperative diagnosis:  Varicose veins with pain and edema bilateral lower extremities    Postoperative diagnosis:  Varicose veins with pain and edema bilateral lower extremities    Operation:  Vein light guided sclerotherapy of multiple veins of bilateral lower extremities    Surgeon:  Vishnu Martinez MD, FACS    Procedure in detail:  With the patient in the supine position on the procedure table in the procedure room in the clinic, the anterior, lateral, and medial aspects of bilateral lower extremity were prepped with alcohol. These regions were then treated with vein light guided sclerotherapy.  The patient was then placed in the prone position in the posterior aspect of the lower extremities were treated in a similar fashion.  The sclerotherapy solution used was 0.5% Polidocanol.  A total of 12 cc of this solution was injected into multiple veins of bilateral lower extremities using a 32 gauge needle attached to a 3 cc syringe.  Compression stockings were then applied to the lower extremities.  The patient tolerated the procedure and left the procedure room in satisfactory and stable condition.      Vishnu Martinez  2/12/2020

## 2020-02-12 NOTE — H&P
The patient is a 72-year-old   female who has some painful varicose veins on bilateral lower extremities that   she has had for several years.  She has worn compression stockings in the past   without much help.  She denies any significant edema. She underwent 1 session of sclerotherapy but she still has some varicose and reticular veins that are patent and she still complaining of pain of bilateral lower extremities.     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     PAST SURGICAL HISTORY:  Colonoscopy, dental implant surgery, breast biopsy     ALLERGIES:  Sulfa, penicillin, rifampin.     MEDICATIONS:  Zyrtec, Flonase, Synthroid.     FAMILY HISTORY:  Breast cancer, melanoma, alcohol abuse.     SOCIAL HISTORY:  The patient does not smoke and never smoked cigarettes.  She   drinks alcohol occasionally.     REVIEW OF SYSTEMS:  She complains of painful varicose veins of bilateral lower   extremities.  All other systems are reviewed and are negative.     PHYSICAL EXAMINATION:  VITAL SIGNS:  Blood pressure is 144/84, respiratory rate is 18, heart rate 53,   height 5 feet 7 inches, weight 142 pounds.  GENERAL:  She is awake and alert, in no apparent distress.  HEENT:  Normocephalic.  Pupils are equal, round, and reactive.  Sclerae are   anicteric.  NECK:  Supple.  Trachea midline.  No masses.  LUNGS:  Clear.  HEART:  Has a regular rate and rhythm.  ABDOMEN:  Soft and nontender.  No masses.  Bowel sounds are present.  EXTREMITIES:  She has varicose and reticular veins of bilateral lower   extremities.  She has trace edema of bilateral lower extremities.  Bilateral   feet are pink and warm with good capillary refill.  NEUROLOGIC:  Awake, alert and oriented.  No lateralizing neurologic deficits.        IMPRESSION:  1.  Varicose veins with pain and edema of bilateral lower extremities.     RECOMMENDATIONS:  We will go ahead with  another session of sclerotherapy today.

## 2020-02-12 NOTE — PROGRESS NOTES
"Patient Name: Sandhya Madrigal     Date: 02/12/2020    Patient Verification: Two stated identifiers    Laser Safety Checklist: N/A    Procedure: Sclerotherapy         Pre-op Vitals:  BP: 136 / 78  HR:68 bpm Height: 5'4.5" Weight: 146 lbs       [x] Allergies Reviewed  [x] Medications Reviewed  [x] Consent Reviewed & Signed       Time Out:     Correct patient: Yes       Correct procedure: Yes     Correct Position: Yes   Correct Laterality: Yes  Bilateral  Time completed: 0912    Procedure Start Time: 0950    Position of Patient::  Supine & Prone    Treatment Solutions:      Asclera: Yes; 0.5 %; 12 total cc   (Solution of 2cc of 1% Asclera,  2cc of Sterile Water)      Ultrasound Guided Sclerotherapy: No     Vein Light Guided Sclerotherapy: Yes       Procedure End Time: 1020    Post-procedural Education Given: Yes    LOC at Discharge: ALERT & ORIENTED    Discharge Time: 1025    MD: Susannah Martinez  NURSE: Afia Perera LPN  CST: NATALIA To    "

## 2020-03-04 DIAGNOSIS — Z00.00 ENCOUNTER FOR PREVENTIVE HEALTH EXAMINATION: ICD-10-CM

## 2020-03-04 DIAGNOSIS — E03.9 HYPOTHYROIDISM (ACQUIRED): ICD-10-CM

## 2020-03-05 ENCOUNTER — OFFICE VISIT (OUTPATIENT)
Dept: VASCULAR SURGERY | Facility: CLINIC | Age: 75
End: 2020-03-05
Payer: MEDICARE

## 2020-03-05 VITALS
DIASTOLIC BLOOD PRESSURE: 78 MMHG | HEART RATE: 59 BPM | SYSTOLIC BLOOD PRESSURE: 145 MMHG | WEIGHT: 143.31 LBS | HEIGHT: 65 IN | BODY MASS INDEX: 23.88 KG/M2

## 2020-03-05 DIAGNOSIS — I83.813 VARICOSE VEINS OF BOTH LOWER EXTREMITIES WITH PAIN: Primary | ICD-10-CM

## 2020-03-05 PROCEDURE — 99213 OFFICE O/P EST LOW 20 MIN: CPT | Mod: S$PBB,,, | Performed by: THORACIC SURGERY (CARDIOTHORACIC VASCULAR SURGERY)

## 2020-03-05 PROCEDURE — 99213 PR OFFICE/OUTPT VISIT, EST, LEVL III, 20-29 MIN: ICD-10-PCS | Mod: S$PBB,,, | Performed by: THORACIC SURGERY (CARDIOTHORACIC VASCULAR SURGERY)

## 2020-03-05 PROCEDURE — 99999 PR PBB SHADOW E&M-EST. PATIENT-LVL III: ICD-10-PCS | Mod: PBBFAC,,, | Performed by: THORACIC SURGERY (CARDIOTHORACIC VASCULAR SURGERY)

## 2020-03-05 PROCEDURE — 99213 OFFICE O/P EST LOW 20 MIN: CPT | Mod: PBBFAC,PO | Performed by: THORACIC SURGERY (CARDIOTHORACIC VASCULAR SURGERY)

## 2020-03-05 PROCEDURE — 99999 PR PBB SHADOW E&M-EST. PATIENT-LVL III: CPT | Mod: PBBFAC,,, | Performed by: THORACIC SURGERY (CARDIOTHORACIC VASCULAR SURGERY)

## 2020-03-05 NOTE — PROGRESS NOTES
The patient is a 74-year-old female me low with varicose veins causing pain and edema bilateral lower extremities.  She underwent vein light guided sclerotherapy of bilateral lower extremity veins several weeks ago.  She states that her legs are feeling better but she still has some discomfort.  On physical examination, a lot of the veins that were treated are going to or improved.  Some of them have thrombosed.  There is mild hyperpigmentation overlying some of the veins.  We will reassess in 3 months and decide whether she needs another session of sclerotherapy.

## 2020-03-05 NOTE — PROGRESS NOTES
Refill Authorization Note     is requesting a refill authorization.    Brief assessment and rationale for refill: APPROVE:  Needs Labs     Medication-related problems identified: Requires labs    Medication Therapy Plan: NTBO (TSH); approve 3 more    Medication reconciliation completed: No                         Comments:   Requested Prescriptions   Pending Prescriptions Disp Refills    levothyroxine (SYNTHROID) 75 MCG tablet 90 tablet 0     Sig: Take 1 tablet (75 mcg total) by mouth before breakfast. LEVOTHYROXINE SODIUM 75 MCG TABS       Endocrinology:  Hypothyroid Agents Failed - 3/4/2020  9:11 AM        Failed - Manual Review: FT4 is not required if last TSH is WNL.        Failed - T4 free within 1080 days     Free T4   Date Value Ref Range Status   2017 1.30 0.78 - 2.19 ng/dL Final     Comment:     Performance of this assay has not been established with    specimens.  When interpretating FT4 results, note the potential   effects of certain drugs on the free-hormone equilibrium. Thyroid   hormone autoantibodies in serum or plasma samples may cause   interference in immunoassays  WARNING: Heterophilic antibodies in the serum or plasma of   certain individuals are known to cause interference with   immunoassays. These antibodies may be present in blood samples   from individuals regularly exposed to animals or who have been   treated with animal serum products.      2016 1.21 0.78 - 2.19 ng/dL Final     Comment:     Performance of this assay has not been established with    specimens.  When interpretating FT4 results, note the potential   effects of certain drugs on the free-hormone equilibrium. Thyroid   hormone autoantibodies in serum or plasma samples may cause   interference in immunoassays  WARNING: Heterophilic antibodies in the serum or plasma of   certain individuals are known to cause interference with   immunoassays. These antibodies may be present in blood samples    from individuals regularly exposed to animals or who have been   treated with animal serum products.                 Passed - Patient is at least 18 years old        Passed - Office visit in past 12 months or future 90 days.     Recent Outpatient Visits            Today Varicose veins of both lower extremities with pain    [facility] Susannah Martinez MD    1 month ago Encounter for preventive health examination    [facility] Sabiha Wbeb NP    3 months ago Gastroesophageal reflux disease, esophagitis presence not specified    [facility] Andrea Jacobs Jr., MD    5 months ago Non-recurrent unilateral inguinal hernia without obstruction or gangrene    [facility] Kee Willson MD    5 months ago Gastroesophageal reflux disease, esophagitis presence not specified    [facility] IVIS Taylor MD          Future Appointments              In 4 [facility]    In 1 week [facility] Taylor MD [facility]    In 1 week [facility] DEXA1 [facility]    In 3 months [facility] MAMMO1 [facility]                Passed - TSH in normal range and within 360 days     TSH   Date Value Ref Range Status   03/11/2019 1.311 0.400 - 4.000 uIU/mL Final   03/12/2018 2.213 0.400 - 4.000 uIU/mL Final   01/13/2017 2.110 0.400 - 4.000 uIU/mL Final     Comment:     Warning:  Heterophilic antibodies in serum or plasma of   certain individuals are known to cause interference with   immunoassays. These antibodies may be present in blood samples   from individuals regularly exposed to animal or who have been   treated with animal products.                  Appointments  past 12m or future 3m with PCP    Date Provider   Last Visit   9/12/2019 IVIS Taylor MD   Next Visit   3/13/2020 IVIS Taylor MD   .  ED visits in past 90 days: 0       Note composed:4:15  PM 03/05/2020

## 2020-03-06 ENCOUNTER — PATIENT MESSAGE (OUTPATIENT)
Dept: VASCULAR SURGERY | Facility: CLINIC | Age: 75
End: 2020-03-06

## 2020-03-06 RX ORDER — LEVOTHYROXINE SODIUM 75 UG/1
75 TABLET ORAL
Qty: 90 TABLET | Refills: 0 | Status: SHIPPED | OUTPATIENT
Start: 2020-03-06 | End: 2020-05-14 | Stop reason: SDUPTHER

## 2020-03-10 ENCOUNTER — PATIENT MESSAGE (OUTPATIENT)
Dept: FAMILY MEDICINE | Facility: CLINIC | Age: 75
End: 2020-03-10

## 2020-05-12 ENCOUNTER — IMMUNIZATION (OUTPATIENT)
Dept: PHARMACY | Facility: CLINIC | Age: 75
End: 2020-05-12
Payer: MEDICARE

## 2020-05-14 DIAGNOSIS — E03.9 HYPOTHYROIDISM (ACQUIRED): ICD-10-CM

## 2020-05-15 RX ORDER — LEVOTHYROXINE SODIUM 75 UG/1
75 TABLET ORAL
Qty: 90 TABLET | Refills: 0 | Status: SHIPPED | OUTPATIENT
Start: 2020-05-15 | End: 2020-05-23 | Stop reason: SDUPTHER

## 2020-05-15 NOTE — PROGRESS NOTES
Refill Authorization Note    is requesting a refill authorization.    Brief assessment and rationale for refill: APPROVE: NEEDS LABS     Medication-related problems identified: Requires labs    Medication Therapy Plan: NTBS(TSH); APRROVE 3 MORE MONTHS    Medication reconciliation completed: No                         Comments:      Requested Prescriptions   Pending Prescriptions Disp Refills    levothyroxine (SYNTHROID) 75 MCG tablet 90 tablet 0     Sig: Take 1 tablet (75 mcg total) by mouth before breakfast. LEVOTHYROXINE SODIUM 75 MCG TABS       Endocrinology:  Hypothyroid Agents Failed - 2020 11:20 AM        Failed - Manual Review: FT4 is not required if last TSH is WNL.        Failed - TSH in normal range and within 360 days     TSH   Date Value Ref Range Status   2019 1.311 0.400 - 4.000 uIU/mL Final   2018 2.213 0.400 - 4.000 uIU/mL Final   2017 2.110 0.400 - 4.000 uIU/mL Final     Comment:     Warning:  Heterophilic antibodies in serum or plasma of   certain individuals are known to cause interference with   immunoassays. These antibodies may be present in blood samples   from individuals regularly exposed to animal or who have been   treated with animal products.                 Failed - T4 free within 1080 days     Free T4   Date Value Ref Range Status   2017 1.30 0.78 - 2.19 ng/dL Final     Comment:     Performance of this assay has not been established with    specimens.  When interpretating FT4 results, note the potential   effects of certain drugs on the free-hormone equilibrium. Thyroid   hormone autoantibodies in serum or plasma samples may cause   interference in immunoassays  WARNING: Heterophilic antibodies in the serum or plasma of   certain individuals are known to cause interference with   immunoassays. These antibodies may be present in blood samples   from individuals regularly exposed to animals or who have been   treated with animal serum  products.      2016 1.21 0.78 - 2.19 ng/dL Final     Comment:     Performance of this assay has not been established with    specimens.  When interpretating FT4 results, note the potential   effects of certain drugs on the free-hormone equilibrium. Thyroid   hormone autoantibodies in serum or plasma samples may cause   interference in immunoassays  WARNING: Heterophilic antibodies in the serum or plasma of   certain individuals are known to cause interference with   immunoassays. These antibodies may be present in blood samples   from individuals regularly exposed to animals or who have been   treated with animal serum products.                 Passed - Patient is at least 18 years old        Passed - Office visit in past 12 months or future 90 days.     Recent Outpatient Visits            2 months ago Varicose veins of both lower extremities with pain    [facility] Susannah Martinez MD    3 months ago Encounter for preventive health examination    [facility] Sabiha Webb NP    6 months ago Gastroesophageal reflux disease, esophagitis presence not specified    [facility] Andrea Jacobs Jr., MD    7 months ago Non-recurrent unilateral inguinal hernia without obstruction or gangrene    [facility] Kee Willson MD    8 months ago Gastroesophageal reflux disease, esophagitis presence not specified    [facility] IVIS Taylor MD          Future Appointments              In 1 month [id number]                 Appointments  past 12m or future 3m with PCP    Date Provider   Last Visit   2019 IVIS Taylor MD   Next Visit   Visit date not found IVIS Taylor MD   ED visits in past 90 days: 0     Note composed:4:29 PM 05/15/2020

## 2020-05-15 NOTE — TELEPHONE ENCOUNTER
Please schedule patient for Labs (TSH)    Please also check with your provider if any further labs need to be added and scheduled together.    Thanks!  [facility]     Note composed: 05/15/2020 4:29 PM

## 2020-05-23 ENCOUNTER — PATIENT MESSAGE (OUTPATIENT)
Dept: FAMILY MEDICINE | Facility: CLINIC | Age: 75
End: 2020-05-23

## 2020-05-23 DIAGNOSIS — E03.9 HYPOTHYROIDISM (ACQUIRED): ICD-10-CM

## 2020-05-25 RX ORDER — LEVOTHYROXINE SODIUM 75 UG/1
75 TABLET ORAL
Qty: 90 TABLET | Refills: 0 | Status: SHIPPED | OUTPATIENT
Start: 2020-05-25 | End: 2020-08-11 | Stop reason: SDUPTHER

## 2020-08-11 ENCOUNTER — OFFICE VISIT (OUTPATIENT)
Dept: FAMILY MEDICINE | Facility: CLINIC | Age: 75
End: 2020-08-11
Payer: MEDICARE

## 2020-08-11 VITALS
HEIGHT: 65 IN | WEIGHT: 142.63 LBS | SYSTOLIC BLOOD PRESSURE: 124 MMHG | BODY MASS INDEX: 23.76 KG/M2 | TEMPERATURE: 98 F | DIASTOLIC BLOOD PRESSURE: 78 MMHG | HEART RATE: 60 BPM

## 2020-08-11 DIAGNOSIS — R19.03 RIGHT LOWER QUADRANT ABDOMINAL SWELLING, MASS AND LUMP: ICD-10-CM

## 2020-08-11 DIAGNOSIS — N39.0 URINARY TRACT INFECTION WITHOUT HEMATURIA, SITE UNSPECIFIED: Primary | ICD-10-CM

## 2020-08-11 DIAGNOSIS — E03.9 HYPOTHYROIDISM (ACQUIRED): ICD-10-CM

## 2020-08-11 LAB
BACTERIA #/AREA URNS HPF: ABNORMAL /HPF
BILIRUB UR QL STRIP: ABNORMAL
CLARITY UR: ABNORMAL
COLOR UR: ABNORMAL
GLUCOSE UR QL STRIP: NEGATIVE
HGB UR QL STRIP: ABNORMAL
HYALINE CASTS #/AREA URNS LPF: 0 /LPF
KETONES UR QL STRIP: NEGATIVE
LEUKOCYTE ESTERASE UR QL STRIP: ABNORMAL
MICROSCOPIC COMMENT: ABNORMAL
NITRITE UR QL STRIP: NEGATIVE
PH UR STRIP: 6 [PH] (ref 5–8)
PROT UR QL STRIP: ABNORMAL
RBC #/AREA URNS HPF: ABNORMAL /HPF (ref 0–4)
SP GR UR STRIP: 1.02 (ref 1–1.03)
URN SPEC COLLECT METH UR: ABNORMAL
WBC #/AREA URNS HPF: 75 /HPF (ref 0–5)

## 2020-08-11 PROCEDURE — 99214 OFFICE O/P EST MOD 30 MIN: CPT | Mod: S$PBB,,, | Performed by: NURSE PRACTITIONER

## 2020-08-11 PROCEDURE — 81000 URINALYSIS NONAUTO W/SCOPE: CPT | Mod: PO

## 2020-08-11 PROCEDURE — 99214 PR OFFICE/OUTPT VISIT, EST, LEVL IV, 30-39 MIN: ICD-10-PCS | Mod: S$PBB,,, | Performed by: NURSE PRACTITIONER

## 2020-08-11 PROCEDURE — 99213 OFFICE O/P EST LOW 20 MIN: CPT | Mod: PBBFAC,PO | Performed by: NURSE PRACTITIONER

## 2020-08-11 PROCEDURE — 99999 PR PBB SHADOW E&M-EST. PATIENT-LVL III: CPT | Mod: PBBFAC,,, | Performed by: NURSE PRACTITIONER

## 2020-08-11 PROCEDURE — 99999 PR PBB SHADOW E&M-EST. PATIENT-LVL III: ICD-10-PCS | Mod: PBBFAC,,, | Performed by: NURSE PRACTITIONER

## 2020-08-11 RX ORDER — LEVOTHYROXINE SODIUM 75 UG/1
75 TABLET ORAL
Qty: 90 TABLET | Refills: 3 | Status: SHIPPED | OUTPATIENT
Start: 2020-08-11 | End: 2021-08-04 | Stop reason: SDUPTHER

## 2020-08-11 RX ORDER — CIPROFLOXACIN 500 MG/1
500 TABLET ORAL 2 TIMES DAILY
Qty: 14 TABLET | Refills: 0 | Status: SHIPPED | OUTPATIENT
Start: 2020-08-11 | End: 2020-08-18

## 2020-08-11 RX ORDER — LEVOTHYROXINE SODIUM 75 UG/1
75 TABLET ORAL
Qty: 90 TABLET | Refills: 3 | Status: SHIPPED | OUTPATIENT
Start: 2020-08-11 | End: 2020-08-11 | Stop reason: SDUPTHER

## 2020-08-11 NOTE — PROGRESS NOTES
Subjective:       Patient ID: Sandhya Mdarigal is a 74 y.o. female.    Chief Complaint: Urinary Tract Infection    Patient has had urinary tract symptoms atrted yesterday slight, worse today. Last UTi was in 10/2017, treated with Cipro.  She does have an area in her right lower abdomen/groin that has felt strange to her concerned about a hernia, bulges at times, she saw Dr clancy in September and he advises it was liekly a hernia but as it was not causing any issues it would not be advisable to proceed, particulalry since she does not want the mesh surgery. She is requesting we do evaluation to see if she does indeed have a hernia.   She has hypothyroidism, due for tsh, needs refill of levothyroxine.     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 by her hx.  5/12/2017: COLONOSCOPY; N/A      Comment:  Procedure: COLONOSCOPY;  Surgeon: Andrea Jacobs Jr., MD;  Location: [facility];  Service: Endoscopy;                 Laterality: N/A;  No date: DENTAL SURGERY      Comment:  4 dental implants    Review of patient's family history indicates:  Problem: Cancer      Relation: Mother          Age of Onset: (Not Specified)          Comment: melanoma  Problem: Arthritis      Relation: Mother          Age of Onset: (Not Specified)  Problem: Breast cancer      Relation: Mother          Age of Onset: 60  Problem: Alcohol abuse      Relation: Father          Age of Onset: (Not Specified)  Problem: Breast cancer      Relation: Maternal Grandmother          Age of Onset: (Not Specified)          Comment: 50's  Problem: Melanoma      Relation: Neg Hx          Age of Onset: (Not Specified)  Problem: Psoriasis      Relation: Neg Hx          Age of  Onset: (Not Specified)  Problem: Lupus      Relation: Neg Hx          Age of Onset: (Not Specified)  Problem: Eczema      Relation: Neg Hx          Age of Onset: (Not Specified)  Problem: Allergic rhinitis      Relation: Neg Hx          Age of Onset: (Not Specified)  Problem: Allergies      Relation: Neg Hx          Age of Onset: (Not Specified)  Problem: Angioedema      Relation: Neg Hx          Age of Onset: (Not Specified)  Problem: Asthma      Relation: Neg Hx          Age of Onset: (Not Specified)  Problem: Atopy      Relation: Neg Hx          Age of Onset: (Not Specified)  Problem: Immunodeficiency      Relation: Neg Hx          Age of Onset: (Not Specified)  Problem: Rhinitis      Relation: Neg Hx          Age of Onset: (Not Specified)  Problem: Urticaria      Relation: Neg Hx          Age of Onset: (Not Specified)      Social History    Socioeconomic History      Marital status: Single      Spouse name: Not on file      Number of children: 0      Years of education: Not on file      Highest education level: Not on file    Occupational History      Not on file    Social Needs      Financial resource strain: Not on file      Food insecurity        Worry: Not on file        Inability: Not on file      Transportation needs        Medical: Not on file        Non-medical: Not on file    Tobacco Use      Smoking status: Never Smoker      Smokeless tobacco: Never Used    Substance and Sexual Activity      Alcohol use: Yes        Alcohol/week: 0.0 standard drinks        Comment: 1 beer a night      Drug use: No      Sexual activity: Yes        Partners: Male    Lifestyle      Physical activity        Days per week: Not on file        Minutes per session: Not on file      Stress: Not on file    Relationships      Social connections        Talks on phone: Not on file        Gets together: Not on file        Attends [employer] service: Not on file        Active member of club or organization: Not on file        Attends  meetings of clubs or organizations: Not on file        Relationship status: Not on file    Other Topics      Concerns:        Are you pregnant or think you may be?: Not Asked        Breast-feeding: Not Asked    Social History Narrative      Not on file      Current Outpatient Medications:  albuterol (VENTOLIN HFA) 90 mcg/actuation inhaler, Inhale 2 puffs into the lungs every 6 (six) hours as needed for Wheezing. Rescue, Disp: 1 Inhaler, Rfl: 5  carboxymethylcellulose (REFRESH PLUS) 0.5 % Dpet, 1 drop 3 (three) times daily as needed., Disp: , Rfl:   cetirizine (ZYRTEC) 10 MG tablet, CETIRIZINE HCL 10 MG TABS, Disp: , Rfl:   famotidine-calcium carbonate-magnesium hydroxide (PEPCID COMPLETE) chewable tablet, Take 1 tablet by mouth daily as needed., Disp: , Rfl:   fluticasone propionate (FLONASE) 50 mcg/actuation nasal spray, 2 sprays (100 mcg total) by Each Nostril route once daily. FLUTICASONE PROPIONATE 50 MCG/ACT SUSP, Disp: 1 Bottle, Rfl: 3  ketotifen (ZADITOR) 0.025 % (0.035 %) ophthalmic solution, Place 1 drop into both eyes 2 (two) times daily as needed., Disp: , Rfl:   levothyroxine (SYNTHROID) 75 MCG tablet, Take 1 tablet (75 mcg total) by mouth before breakfast. LEVOTHYROXINE SODIUM 75 MCG TABS, Disp: 90 tablet, Rfl: 0  MULTIVIT-MIN/IRON/FOLIC/LUTEIN (CENTRUM SILVER WOMEN ORAL), Take 1 tablet by mouth once daily., Disp: , Rfl:   vit C,D-Lm-putpb-lutein-zeaxan (PRESERVISION AREDS 2) [id number] mg-unit-mg-mg Cap, Take 2 capsules by mouth once daily., Disp: , Rfl:     No current facility-administered medications for this visit.       Review of patient's allergies indicates:   -- Sulfa (sulfonamide antibiotics)     --  rash   -- Pcn (penicillins) -- Rash   -- Rifampin -- Rash    Review of Systems   Constitutional: Negative for chills and fever.   HENT: Negative.    Respiratory: Negative.  Negative for cough and shortness of breath.    Cardiovascular: Negative.    Gastrointestinal: Negative.  Negative for  abdominal pain.   Genitourinary: Positive for dysuria and urgency.   Musculoskeletal: Negative for arthralgias and back pain.   Neurological: Negative.          Objective:      Physical Exam  Cardiovascular:      Rate and Rhythm: Normal rate.   Pulmonary:      Effort: Pulmonary effort is normal.   Abdominal:      Tenderness: There is no right CVA tenderness or left CVA tenderness.       Neurological:      General: No focal deficit present.      Mental Status: She is alert and oriented to person, place, and time.         Assessment:       1. Urinary tract infection without hematuria, site unspecified    2. Right lower quadrant abdominal swelling, mass and lump    3. Hypothyroidism (acquired)        Plan:       1. Urinary tract infection without hematuria, site unspecified  Increase fluids, avoid caffeine, follow up if not resolving.   - Urinalysis  - ciprofloxacin HCl (CIPRO) 500 MG tablet; Take 1 tablet (500 mg total) by mouth 2 (two) times daily. for 7 days  Dispense: 14 tablet; Refill: 0    2. Right lower quadrant abdominal swelling, mass and lump    - US Abdomen Limited_Hernia; Future    3. Hypothyroidism (acquired)  Refill current medication. Schedule tsh.  - levothyroxine (SYNTHROID) 75 MCG tablet; Take 1 tablet (75 mcg total) by mouth before breakfast. LEVOTHYROXINE SODIUM 75 MCG TABS  Dispense: 90 tablet; Refill: 3

## 2020-08-19 ENCOUNTER — HOSPITAL ENCOUNTER (OUTPATIENT)
Dept: RADIOLOGY | Facility: HOSPITAL | Age: 75
Discharge: HOME | End: 2020-08-19
Attending: NURSE PRACTITIONER
Payer: MEDICARE

## 2020-08-19 DIAGNOSIS — R19.03 RIGHT LOWER QUADRANT ABDOMINAL SWELLING, MASS AND LUMP: ICD-10-CM

## 2020-08-19 PROCEDURE — 76705 ECHO EXAM OF ABDOMEN: CPT | Mod: TC,PO

## 2020-08-19 PROCEDURE — 76705 ECHO EXAM OF ABDOMEN: CPT | Mod: 26,,, | Performed by: RADIOLOGY

## 2020-08-19 PROCEDURE — 76705 US ABDOMEN LIMITED_HERNIA: ICD-10-PCS | Mod: 26,,, | Performed by: RADIOLOGY

## 2020-08-28 ENCOUNTER — TELEPHONE (OUTPATIENT)
Dept: FAMILY MEDICINE | Facility: CLINIC | Age: 75
End: 2020-08-28

## 2020-08-28 DIAGNOSIS — J30.9 ALLERGIC RHINITIS: ICD-10-CM

## 2020-08-28 RX ORDER — FLUTICASONE PROPIONATE 50 MCG
2 SPRAY, SUSPENSION (ML) NASAL DAILY
Qty: 48 G | Refills: 0 | Status: SHIPPED | OUTPATIENT
Start: 2020-08-28 | End: 2021-02-26 | Stop reason: SDUPTHER

## 2020-08-28 NOTE — TELEPHONE ENCOUNTER
Care Due:                  Date            Visit Type   Department     Provider  --------------------------------------------------------------------------------                                ESTABLISHED                              PATIENT      [facility] FAMILY  Last Visit: 09-      [facility]        DOC HERNANDEZ  Next Visit: None Scheduled  None         None Found                                                            Last  Test          Frequency    Reason                     Performed    Due Date  --------------------------------------------------------------------------------    Office Visit  12 months..  albuterol................  09- 09-    Powered by [employer]. Reference number: [id number]. 8/28/2020 7:38:38 AM CDT

## 2020-08-29 NOTE — PROGRESS NOTES
Provider Staff:     Please schedule patient for Annual    Please also check with your provider if any further labs need to be added and scheduled together.    Thanks!  [facility]     Appointments  past 12m or future 3m with PCP    Date Provider   Last Visit   9/12/2019 IVIS Taylor MD   Next Visit   Visit date not found IVIS Taylor MD     Note composed:10:50 PM 08/28/2020

## 2020-08-29 NOTE — PROGRESS NOTES
Refill Authorization Note     is requesting a refill authorization.    Brief assessment and rationale for refill: APPROVE: prr               Medication reconciliation completed: No                         Comments:   Automatic Epic Protocol Generated Data:    Requested Prescriptions   Signed Prescriptions Disp Refills    fluticasone propionate (FLONASE) 50 mcg/actuation nasal spray 48 g 0     Si sprays (100 mcg total) by Each Nostril route once daily. FLUTICASONE PROPIONATE 50 MCG/ACT SUSP       Ear, Nose, and Throat: Nasal Preparations - Corticosteroids Passed - 2020  7:38 AM        Passed - Patient is at least 18 years old        Passed - An appropriate indication is on the problem list     Allergic Rhinitis  Sinusitis  Seasonal Allergies              Passed - Office visit in past 12 months or future 90 days.     Recent Outpatient Visits            2 weeks ago Urinary tract infection without hematuria, site unspecified    [facility] Irene Zayas NP    5 months ago Varicose veins of both lower extremities with pain    [facility] Susannah Martinez MD    6 months ago Encounter for preventive health examination    [facility] Sabiha Webb NP    9 months ago Gastroesophageal reflux disease, esophagitis presence not specified    [facility] Andrea Jacobs Jr., MD    11 months ago Non-recurrent unilateral inguinal hernia without obstruction or gangrene    [facility] Kee Willson MD                          Appointments  past 12m or future 3m with PCP    Date Provider   Last Visit   2019 IVIS Taylor MD   Next Visit   Visit date not found IVIS Taylor MD   ED visits in past 90 days: 0     Note composed:10:48 PM 2020

## 2020-09-30 ENCOUNTER — IMMUNIZATION (OUTPATIENT)
Dept: FAMILY MEDICINE | Facility: CLINIC | Age: 75
End: 2020-09-30
Payer: MEDICARE

## 2020-09-30 PROCEDURE — 90694 VACC AIIV4 NO PRSRV 0.5ML IM: CPT | Mod: PBBFAC,PO

## 2020-09-30 PROCEDURE — G0008 ADMIN INFLUENZA VIRUS VAC: HCPCS | Mod: PBBFAC,PO

## 2020-10-06 ENCOUNTER — LAB VISIT (OUTPATIENT)
Dept: LAB | Facility: HOSPITAL | Age: 75
End: 2020-10-06
Attending: FAMILY MEDICINE
Payer: MEDICARE

## 2020-10-06 ENCOUNTER — OFFICE VISIT (OUTPATIENT)
Dept: FAMILY MEDICINE | Facility: CLINIC | Age: 75
End: 2020-10-06
Payer: MEDICARE

## 2020-10-06 VITALS
WEIGHT: 145.06 LBS | DIASTOLIC BLOOD PRESSURE: 80 MMHG | SYSTOLIC BLOOD PRESSURE: 148 MMHG | OXYGEN SATURATION: 95 % | BODY MASS INDEX: 24.17 KG/M2 | TEMPERATURE: 98 F | HEART RATE: 79 BPM | HEIGHT: 65 IN

## 2020-10-06 DIAGNOSIS — R79.89 ELEVATED PTHRP LEVEL: ICD-10-CM

## 2020-10-06 DIAGNOSIS — E03.9 HYPOTHYROIDISM (ACQUIRED): ICD-10-CM

## 2020-10-06 DIAGNOSIS — Z78.0 POST-MENOPAUSAL: ICD-10-CM

## 2020-10-06 DIAGNOSIS — R09.89 BRUIT OF RIGHT CAROTID ARTERY: Primary | ICD-10-CM

## 2020-10-06 DIAGNOSIS — M85.80 OSTEOPENIA, UNSPECIFIED LOCATION: ICD-10-CM

## 2020-10-06 LAB
ANION GAP SERPL CALC-SCNC: 8 MMOL/L (ref 8–16)
BUN SERPL-MCNC: 18 MG/DL (ref 8–23)
CALCIUM SERPL-MCNC: 8.9 MG/DL (ref 8.7–10.5)
CHLORIDE SERPL-SCNC: 107 MMOL/L (ref 95–110)
CO2 SERPL-SCNC: 26 MMOL/L (ref 23–29)
CREAT SERPL-MCNC: 1 MG/DL (ref 0.5–1.4)
EST. GFR  (AFRICAN AMERICAN): >60 ML/MIN/1.73 M^2
EST. GFR  (NON AFRICAN AMERICAN): 56 ML/MIN/1.73 M^2
GLUCOSE SERPL-MCNC: 86 MG/DL (ref 70–110)
POTASSIUM SERPL-SCNC: 4.9 MMOL/L (ref 3.5–5.1)
PTH-INTACT SERPL-MCNC: 115 PG/ML (ref 9–77)
SODIUM SERPL-SCNC: 141 MMOL/L (ref 136–145)

## 2020-10-06 PROCEDURE — 36415 COLL VENOUS BLD VENIPUNCTURE: CPT | Mod: PO

## 2020-10-06 PROCEDURE — 99999 PR PBB SHADOW E&M-EST. PATIENT-LVL IV: CPT | Mod: PBBFAC,,, | Performed by: FAMILY MEDICINE

## 2020-10-06 PROCEDURE — 83970 ASSAY OF PARATHORMONE: CPT

## 2020-10-06 PROCEDURE — 99214 PR OFFICE/OUTPT VISIT, EST, LEVL IV, 30-39 MIN: ICD-10-PCS | Mod: S$PBB,,, | Performed by: FAMILY MEDICINE

## 2020-10-06 PROCEDURE — 99999 PR PBB SHADOW E&M-EST. PATIENT-LVL IV: ICD-10-PCS | Mod: PBBFAC,,, | Performed by: FAMILY MEDICINE

## 2020-10-06 PROCEDURE — 80048 BASIC METABOLIC PNL TOTAL CA: CPT

## 2020-10-06 PROCEDURE — 99214 OFFICE O/P EST MOD 30 MIN: CPT | Mod: S$PBB,,, | Performed by: FAMILY MEDICINE

## 2020-10-06 PROCEDURE — 99214 OFFICE O/P EST MOD 30 MIN: CPT | Mod: PBBFAC,PO | Performed by: FAMILY MEDICINE

## 2020-10-06 NOTE — PROGRESS NOTES
"Subjective:       Patient ID: Sandhya Madrigal is a 74 y.o. female.    Chief Complaint: Annual Exam    Pt is known to me.  Pt is here for followup of chronic medical issues.  The pt has been doing well.  She rides her bike outdoors twice a day for about 30 minutes. She had a fall off her bike 2 days--her ankle sprain was mild and is resolving.  She continues to be compliant with her thyroid medication.    Review of Systems   Constitutional: Negative for activity change and unexpected weight change.   HENT: Negative for hearing loss, rhinorrhea and trouble swallowing.    Eyes: Positive for visual disturbance (sees her eye doctor every 6 months for cataracts). Negative for discharge.   Respiratory: Negative for chest tightness and wheezing.    Cardiovascular: Negative for chest pain and palpitations.   Gastrointestinal: Negative for abdominal pain, blood in stool and constipation.        GERD   Endocrine: Negative for polydipsia and polyuria.   Genitourinary: Negative for difficulty urinating, dysuria, hematuria and menstrual problem.   Musculoskeletal: Negative for arthralgias, joint swelling and neck pain.   Neurological: Negative for weakness and headaches.   Psychiatric/Behavioral: Negative for confusion, dysphoric mood and sleep disturbance.       Objective:       Vitals:    10/06/20 1106   BP: (!) 142/62   BP Location: Right arm   Patient Position: Sitting   Pulse: 79   Temp: 98.4 °F (36.9 °C)   TempSrc: Oral   SpO2: 95%   Weight: 65.8 kg (145 lb 1 oz)   Height: 5' 5" (1.651 m)     Physical Exam  Constitutional:       Appearance: She is well-developed.   HENT:      Head: Normocephalic.   Eyes:      Conjunctiva/sclera: Conjunctivae normal.      Pupils: Pupils are equal, round, and reactive to light.   Neck:      Musculoskeletal: Normal range of motion and neck supple.      Thyroid: No thyromegaly.      Vascular: Carotid bruit (right) present.   Cardiovascular:      Rate and Rhythm: Normal rate and regular rhythm.    "   Heart sounds: Normal heart sounds.   Pulmonary:      Effort: Pulmonary effort is normal.      Breath sounds: Normal breath sounds.   Abdominal:      General: Bowel sounds are normal.      Palpations: Abdomen is soft.      Tenderness: There is no abdominal tenderness.   Musculoskeletal: Normal range of motion.         General: No tenderness or deformity.   Lymphadenopathy:      Cervical: No cervical adenopathy.   Skin:     General: Skin is warm and dry.   Neurological:      Mental Status: She is alert and oriented to person, place, and time.      Cranial Nerves: No cranial nerve deficit.      Motor: No abnormal muscle tone.      Coordination: Coordination normal.      Deep Tendon Reflexes: Reflexes normal.   Psychiatric:         Behavior: Behavior normal.         Assessment:       1. Bruit of right carotid artery    2. Hypothyroidism (acquired)    3. Osteopenia, unspecified location    4. Elevated PTHrP level    5. Post-menopausal        Plan:       Sandhya was seen today for annual exam.    Diagnoses and all orders for this visit:    Bruit of right carotid artery  -     US Carotid Bilateral; Future    Hypothyroidism (acquired)    Osteopenia, unspecified location  -     DXA Bone Density Spine And Hip; Future    Elevated PTHrP level  -     Basic Metabolic Panel; Future  -     PTH, intact; Future    Post-menopausal  -     DXA Bone Density Spine And Hip; Future      During this visit, I reviewed the pt's history, medications, allergies, and problem list.

## 2020-10-07 DIAGNOSIS — M25.571 RIGHT ANKLE PAIN, UNSPECIFIED CHRONICITY: Primary | ICD-10-CM

## 2020-10-08 ENCOUNTER — HOSPITAL ENCOUNTER (OUTPATIENT)
Dept: RADIOLOGY | Facility: HOSPITAL | Age: 75
Discharge: HOME | End: 2020-10-08
Attending: ORTHOPAEDIC SURGERY
Payer: MEDICARE

## 2020-10-08 ENCOUNTER — OFFICE VISIT (OUTPATIENT)
Dept: ORTHOPEDICS | Facility: CLINIC | Age: 75
End: 2020-10-08
Payer: MEDICARE

## 2020-10-08 VITALS — HEIGHT: 65 IN | WEIGHT: 145.06 LBS | BODY MASS INDEX: 24.17 KG/M2

## 2020-10-08 DIAGNOSIS — M79.671 RIGHT FOOT PAIN: ICD-10-CM

## 2020-10-08 DIAGNOSIS — M25.571 RIGHT ANKLE PAIN, UNSPECIFIED CHRONICITY: ICD-10-CM

## 2020-10-08 DIAGNOSIS — M20.41 HAMMERTOE OF SECOND TOE OF RIGHT FOOT: ICD-10-CM

## 2020-10-08 DIAGNOSIS — M21.611 HALLUX VALGUS WITH BUNIONS OF RIGHT FOOT: ICD-10-CM

## 2020-10-08 DIAGNOSIS — M25.571 RIGHT ANKLE PAIN, UNSPECIFIED CHRONICITY: Primary | ICD-10-CM

## 2020-10-08 DIAGNOSIS — L84 CORN OF TOE: ICD-10-CM

## 2020-10-08 DIAGNOSIS — E55.9 VITAMIN D DEFICIENCY: ICD-10-CM

## 2020-10-08 DIAGNOSIS — M20.11 HALLUX VALGUS WITH BUNIONS OF RIGHT FOOT: ICD-10-CM

## 2020-10-08 PROCEDURE — 73630 X-RAY EXAM OF FOOT: CPT | Mod: TC,PO,RT

## 2020-10-08 PROCEDURE — 99999 PR PBB SHADOW E&M-EST. PATIENT-LVL III: CPT | Mod: PBBFAC,,, | Performed by: ORTHOPAEDIC SURGERY

## 2020-10-08 PROCEDURE — 99204 OFFICE O/P NEW MOD 45 MIN: CPT | Mod: S$PBB,,, | Performed by: ORTHOPAEDIC SURGERY

## 2020-10-08 PROCEDURE — 73630 X-RAY EXAM OF FOOT: CPT | Mod: 26,RT,, | Performed by: RADIOLOGY

## 2020-10-08 PROCEDURE — 99204 PR OFFICE/OUTPT VISIT, NEW, LEVL IV, 45-59 MIN: ICD-10-PCS | Mod: S$PBB,,, | Performed by: ORTHOPAEDIC SURGERY

## 2020-10-08 PROCEDURE — 99213 OFFICE O/P EST LOW 20 MIN: CPT | Mod: PBBFAC,25,PN | Performed by: ORTHOPAEDIC SURGERY

## 2020-10-08 PROCEDURE — 73630 XR FOOT COMPLETE 3 VIEW RIGHT: ICD-10-PCS | Mod: 26,RT,, | Performed by: RADIOLOGY

## 2020-10-08 PROCEDURE — 99999 PR PBB SHADOW E&M-EST. PATIENT-LVL III: ICD-10-PCS | Mod: PBBFAC,,, | Performed by: ORTHOPAEDIC SURGERY

## 2020-10-08 NOTE — LETTER
October 13, 2020      IVIS Taylor MD  [address] [facility] [address] [place] [address]           [facility] [place]  [address] [place] [address]  Phone: [phone]          Patient: Sandhya Madrigal   MR Number: [id number]   YOB: 1945   Date of Visit: 10/8/2020       Dear Dr. IVIS Taylor:    Thank you for referring Sandhya Madrigal to me for evaluation. Attached you will find relevant portions of my assessment and plan of care.    If you have questions, please do not hesitate to call me. I look forward to following Sandhya Madrigal along with you.    Sincerely,    Zafar Saldivar MD    Enclosure  CC:  No Recipients    If you would like to receive this communication electronically, please contact [email or web address] or [phone] to request more information on [employer] access.    For providers and/or their staff who would like to refer a patient to [facility], please contact us through our one-stop-shop provider referral line, [facility], at [phone].    If you feel you have received this communication in error or would no longer like to receive these types of communications, please e-mail [email or web address]

## 2020-10-08 NOTE — NURSING
Instructed patient on preop instructions. Patient to stop taking any blood thinning medications at least seven days prior to scheduled surgery. Patient to not take any NSAIDS at least 7 days prior to surgery and will resume when Dr. Saldivar instructs them to as it can delay bone healing. Nothing to eat/drink after midnight the night prior to surgery.   Patient instructed on postop care. Instructions included to remain non weight bearing until instructed otherwise by Dr. Saldivar. Post op dressing can not get wet. If it gets wet patient to call office immediately or go to [facility] ER to have it replaced. Patient to keep affected limb elevated higher than the heart at all times after surgery to decrease swelling. Further instructions given to patient on preop/postop instruction handout. No NSAID form given to patient. Handout on knee scooter given to patient as well. Patient verbalized understanding.

## 2020-10-13 PROBLEM — L84 CORN OF TOE: Status: ACTIVE | Noted: 2020-10-13

## 2020-10-13 PROBLEM — M20.41 HAMMERTOE OF SECOND TOE OF RIGHT FOOT: Status: ACTIVE | Noted: 2020-10-13

## 2020-10-13 PROBLEM — M21.611 HALLUX VALGUS WITH BUNIONS OF RIGHT FOOT: Status: ACTIVE | Noted: 2020-10-13

## 2020-10-13 PROBLEM — M20.11 HALLUX VALGUS WITH BUNIONS OF RIGHT FOOT: Status: ACTIVE | Noted: 2020-10-13

## 2020-10-13 NOTE — PROGRESS NOTES
"HPI: Sandhya Madrigal is a 74 y.o. female who was referred to me by Dr. Asha Taylor and was seen in consultation today for chronic right foot pain.  No history of injury or trauma. She did have an ankle injury on 10/4 which has now resolved. She is here for evaluation of her bunion which has been worsening since 2015. Pt denies weakness, numbness, and tingling. She rates her pain as 0/10 today. The pain is worse with closed toe shoe wear.    She has tried shoe modification without relief.     PAST MEDICAL/SURGICAL/FAMILY/SOCIAL/ HISTORY: REVIEWED    ALLERGIES/MEDICATIONS: REVIEWED       Review of Systems:     Constitution: Negative.   HEENT: Negative.   Eyes: Negative.   Cardiovascular: Negative.   Respiratory: Negative.   Endocrine: Negative.   Hematologic/Lymphatic: Negative.   Skin: Negative.   Musculoskeletal: See HPI  Gastrointestinal: Negative.   Genitourinary: Negative.   Neurological: Negative.   Psychiatric/Behavioral: Negative.   Allergic/Immunologic: Negative.       PHYSICAL EXAM:  There were no vitals filed for this visit.  Ht Readings from Last 1 Encounters:   10/08/20 5' 5" (1.651 m)     Wt Readings from Last 1 Encounters:   10/08/20 65.8 kg (145 lb 1 oz)     GENERAL: Well developed, well nourished, no acute distress.  SKIN: Skin is intact. No atrophy, abrasions or lesions are noted.   Neurological: Normal mental status. Appropriate and conversant. Alert and oriented x 3.  GAIT: Walks with non-antalgic gait.    Right lower extremity compared with LLE:  2+ dorsalis pedis pulse.  Capillary refill < 3 seconds.  Normal range of motion tibiotalar and subtalar joints. 5/5 strength EHL, FHL, tibialis anterior, gastrocsoleus, tibialis posterior and peroneals. Sensation to light touch intact sural, saphenous, superficial peroneal and deep peroneal nerves. No lymphadenopathy, no masses or tumors palpated.  Severe hallux valgus with 2nd toe hammertoe with crossing of the toe over the hallux,  tenderness to palpation " under 2nd metatarsal head, 4th toe hard corn lateral PIPJ that is tender to palpation. Dystrophic 5th toenail.     XRAYS:   3 views of right foot obtained and reviewed today reveal severe hallux valgus with ARTHUR 20 and HVA 40      ASSESSMENT:        Encounter Diagnosis   Name Primary?    Right ankle pain, unspecified chronicity Yes     Right hallux valgus with bunion, 2nd toe hammertoe, 4th toe corn    PLAN:   I spent 45 minutes in consulation with the patient today. More than half the time was spent counseling the patient on her condition. We discussed surgical tx for the patient's condition at length and in detail including post-operative course, complications, outcomes and prognosis using diagrams and models where appropriate. Informed consent was obtained, patient understands risks and benefits of procedure.   I recommend right hallux valgus correction with bunionectomy, distal soft tissue release, 2nd toe hammertoe correction with EDB tendon transfer, partial condylectomy 4th toe to treat corn. We also discussed possibly removing the 5th toenail but she will think about that as it will most likely not grow back.   To OR 11/18/20.

## 2020-10-15 ENCOUNTER — HOSPITAL ENCOUNTER (OUTPATIENT)
Dept: RADIOLOGY | Facility: HOSPITAL | Age: 75
Discharge: HOME | End: 2020-10-15
Attending: FAMILY MEDICINE
Payer: MEDICARE

## 2020-10-15 DIAGNOSIS — M85.80 OSTEOPENIA, UNSPECIFIED LOCATION: ICD-10-CM

## 2020-10-15 DIAGNOSIS — Z78.0 POST-MENOPAUSAL: ICD-10-CM

## 2020-10-15 DIAGNOSIS — R09.89 BRUIT OF RIGHT CAROTID ARTERY: ICD-10-CM

## 2020-10-15 PROCEDURE — 93880 EXTRACRANIAL BILAT STUDY: CPT | Mod: TC,PO

## 2020-10-15 PROCEDURE — 77080 DXA BONE DENSITY AXIAL: CPT | Mod: 26,,, | Performed by: RADIOLOGY

## 2020-10-15 PROCEDURE — 77080 DEXA BONE DENSITY SPINE HIP: ICD-10-PCS | Mod: 26,,, | Performed by: RADIOLOGY

## 2020-10-15 PROCEDURE — 93880 EXTRACRANIAL BILAT STUDY: CPT | Mod: 26,,, | Performed by: RADIOLOGY

## 2020-10-15 PROCEDURE — 77080 DXA BONE DENSITY AXIAL: CPT | Mod: TC,PO

## 2020-10-15 PROCEDURE — 93880 US CAROTID BILATERAL: ICD-10-PCS | Mod: 26,,, | Performed by: RADIOLOGY

## 2020-10-23 ENCOUNTER — TELEPHONE (OUTPATIENT)
Dept: ORTHOPEDICS | Facility: CLINIC | Age: 75
End: 2020-10-23

## 2020-10-23 PROBLEM — M25.571 RIGHT ANKLE PAIN: Status: ACTIVE | Noted: 2020-10-23

## 2020-10-23 RX ORDER — SODIUM CHLORIDE 9 MG/ML
INJECTION, SOLUTION INTRAVENOUS CONTINUOUS
Status: CANCELLED | OUTPATIENT
Start: 2020-10-23

## 2020-10-23 RX ORDER — MUPIROCIN 20 MG/G
OINTMENT TOPICAL
Status: CANCELLED | OUTPATIENT
Start: 2020-10-23

## 2020-10-23 NOTE — TELEPHONE ENCOUNTER
Left message on voicemail for pt to call back Monday. Need to have answer the screening sheet for anesthesia. Thanks, Chika

## 2020-11-02 ENCOUNTER — TELEPHONE (OUTPATIENT)
Dept: ORTHOPEDICS | Facility: CLINIC | Age: 75
End: 2020-11-02

## 2020-11-02 NOTE — TELEPHONE ENCOUNTER
Called pt back. We already spoke about the anesthesia questionnaire last week. Pt also states that she does not want to have the toenail removed on her small toe. Thanks, Chika

## 2020-11-02 NOTE — TELEPHONE ENCOUNTER
----- Message from Richa Phelps MA sent at 11/2/2020  2:30 PM CST -----  Contact: pt  Upcoming surgery information   Anesthesia   Call back

## 2020-11-11 ENCOUNTER — TELEPHONE (OUTPATIENT)
Dept: ORTHOPEDICS | Facility: CLINIC | Age: 75
End: 2020-11-11

## 2020-11-11 NOTE — TELEPHONE ENCOUNTER
Called pt back. She wanted to know if Dr. Saldivar orders home health after surgery? Advised pt that Dr. Saldivar usually does not order home health for the type of surgery pt is having. Pt states that she may need some assistance after surgery. She will contact her PCP for advice. Thanks, Chika

## 2020-11-11 NOTE — TELEPHONE ENCOUNTER
----- Message from Richa Phelps MA sent at 11/11/2020  3:28 PM CST -----  Contact: pt  Pt has questions on [facility]    Call back  [phone]

## 2020-11-14 ENCOUNTER — PATIENT MESSAGE (OUTPATIENT)
Dept: SURGERY | Facility: HOSPITAL | Age: 75
End: 2020-11-14

## 2020-11-15 ENCOUNTER — LAB VISIT (OUTPATIENT)
Dept: FAMILY MEDICINE | Facility: CLINIC | Age: 75
End: 2020-11-15
Payer: MEDICARE

## 2020-11-15 DIAGNOSIS — M25.571 RIGHT ANKLE PAIN, UNSPECIFIED CHRONICITY: ICD-10-CM

## 2020-11-15 PROCEDURE — U0003 INFECTIOUS AGENT DETECTION BY NUCLEIC ACID (DNA OR RNA); SEVERE ACUTE RESPIRATORY SYNDROME CORONAVIRUS 2 (SARS-COV-2) (CORONAVIRUS DISEASE [COVID-19]), AMPLIFIED PROBE TECHNIQUE, MAKING USE OF HIGH THROUGHPUT TECHNOLOGIES AS DESCRIBED BY CMS-2020-01-R: HCPCS

## 2020-11-16 LAB — SARS-COV-2 RNA RESP QL NAA+PROBE: NOT DETECTED

## 2020-11-17 ENCOUNTER — ANESTHESIA EVENT (OUTPATIENT)
Dept: SURGERY | Facility: HOSPITAL | Age: 75
End: 2020-11-17
Payer: MEDICARE

## 2020-11-18 ENCOUNTER — HOSPITAL ENCOUNTER (OUTPATIENT)
Facility: HOSPITAL | Age: 75
Discharge: HOME | End: 2020-11-18
Attending: ORTHOPAEDIC SURGERY | Admitting: ORTHOPAEDIC SURGERY
Payer: MEDICARE

## 2020-11-18 ENCOUNTER — PATIENT MESSAGE (OUTPATIENT)
Dept: ORTHOPEDICS | Facility: CLINIC | Age: 75
End: 2020-11-18

## 2020-11-18 ENCOUNTER — ANESTHESIA (OUTPATIENT)
Dept: SURGERY | Facility: HOSPITAL | Age: 75
End: 2020-11-18
Payer: MEDICARE

## 2020-11-18 ENCOUNTER — HOSPITAL ENCOUNTER (OUTPATIENT)
Dept: RADIOLOGY | Facility: HOSPITAL | Age: 75
Discharge: HOME | End: 2020-11-18
Attending: ORTHOPAEDIC SURGERY | Admitting: ORTHOPAEDIC SURGERY
Payer: MEDICARE

## 2020-11-18 DIAGNOSIS — L84 CORN OF TOE: ICD-10-CM

## 2020-11-18 DIAGNOSIS — M25.571 RIGHT ANKLE PAIN, UNSPECIFIED CHRONICITY: ICD-10-CM

## 2020-11-18 DIAGNOSIS — M20.41 HAMMERTOE OF SECOND TOE OF RIGHT FOOT: ICD-10-CM

## 2020-11-18 DIAGNOSIS — E55.9 VITAMIN D DEFICIENCY: ICD-10-CM

## 2020-11-18 DIAGNOSIS — M21.611 HALLUX VALGUS WITH BUNIONS OF RIGHT FOOT: Primary | ICD-10-CM

## 2020-11-18 DIAGNOSIS — M20.11 HALLUX VALGUS WITH BUNIONS OF RIGHT FOOT: Primary | ICD-10-CM

## 2020-11-18 LAB — 25(OH)D3+25(OH)D2 SERPL-MCNC: 45 NG/ML (ref 30–96)

## 2020-11-18 PROCEDURE — D9220A PRA ANESTHESIA: Mod: ANES,,, | Performed by: ANESTHESIOLOGY

## 2020-11-18 PROCEDURE — 28645 REPAIR TOE DISLOCATION: CPT | Mod: 59,51,T6, | Performed by: ORTHOPAEDIC SURGERY

## 2020-11-18 PROCEDURE — 64445 NJX AA&/STRD SCIATIC NRV IMG: CPT | Mod: 59,PO | Performed by: ANESTHESIOLOGY

## 2020-11-18 PROCEDURE — D9220A PRA ANESTHESIA: Mod: CRNA,,, | Performed by: NURSE ANESTHETIST, CERTIFIED REGISTERED

## 2020-11-18 PROCEDURE — 27201423 OPTIME MED/SURG SUP & DEVICES STERILE SUPPLY: Mod: PO | Performed by: ORTHOPAEDIC SURGERY

## 2020-11-18 PROCEDURE — D9220A PRA ANESTHESIA: ICD-10-PCS | Mod: ANES,,, | Performed by: ANESTHESIOLOGY

## 2020-11-18 PROCEDURE — 25000003 PHARM REV CODE 250: Mod: PO | Performed by: ANESTHESIOLOGY

## 2020-11-18 PROCEDURE — 37000008 HC ANESTHESIA 1ST 15 MINUTES: Mod: PO | Performed by: ORTHOPAEDIC SURGERY

## 2020-11-18 PROCEDURE — 28292 PR CORRECT BUNION, INCL W/RES OF PROX PHALANX BASE, ANY METHOD: ICD-10-PCS | Mod: 51,RT,, | Performed by: ORTHOPAEDIC SURGERY

## 2020-11-18 PROCEDURE — 36000709 HC OR TIME LEV III EA ADD 15 MIN: Mod: PO | Performed by: ORTHOPAEDIC SURGERY

## 2020-11-18 PROCEDURE — 63600175 PHARM REV CODE 636 W HCPCS: Mod: PO | Performed by: ANESTHESIOLOGY

## 2020-11-18 PROCEDURE — 36000708 HC OR TIME LEV III 1ST 15 MIN: Mod: PO | Performed by: ORTHOPAEDIC SURGERY

## 2020-11-18 PROCEDURE — 64445 PR NERVE BLOCK INJ, ANES/STEROID, SCIATIC, INCL IMAG GUIDANCE: ICD-10-PCS | Mod: 59,RT,, | Performed by: ANESTHESIOLOGY

## 2020-11-18 PROCEDURE — 28645 PR OPEN TX METATARSOPHALANGEAL JOINT DISLOCATION: ICD-10-PCS | Mod: 59,51,T6, | Performed by: ORTHOPAEDIC SURGERY

## 2020-11-18 PROCEDURE — 76942 PR U/S GUIDANCE FOR NEEDLE GUIDANCE: ICD-10-PCS | Mod: 26,,, | Performed by: ANESTHESIOLOGY

## 2020-11-18 PROCEDURE — 76942 ECHO GUIDE FOR BIOPSY: CPT | Mod: PO | Performed by: ANESTHESIOLOGY

## 2020-11-18 PROCEDURE — 71000033 HC RECOVERY, INTIAL HOUR: Mod: PO | Performed by: ORTHOPAEDIC SURGERY

## 2020-11-18 PROCEDURE — 28292 COR HLX VLGS RSC PRX PHLX BS: CPT | Mod: 51,RT,, | Performed by: ORTHOPAEDIC SURGERY

## 2020-11-18 PROCEDURE — 64445 NJX AA&/STRD SCIATIC NRV IMG: CPT | Mod: 59,RT,, | Performed by: ANESTHESIOLOGY

## 2020-11-18 PROCEDURE — 71000039 HC RECOVERY, EACH ADD'L HOUR: Mod: PO | Performed by: ORTHOPAEDIC SURGERY

## 2020-11-18 PROCEDURE — 28285 REPAIR OF HAMMERTOE: CPT | Mod: 59,51,T6, | Performed by: ORTHOPAEDIC SURGERY

## 2020-11-18 PROCEDURE — 25000003 PHARM REV CODE 250: Mod: PO | Performed by: NURSE ANESTHETIST, CERTIFIED REGISTERED

## 2020-11-18 PROCEDURE — 28740 PR FUSION FOOT BONE,MIDTARSAL,1 JT: ICD-10-PCS | Mod: RT,,, | Performed by: ORTHOPAEDIC SURGERY

## 2020-11-18 PROCEDURE — 28285 PR REPAIR OF HAMMERTOE,ONE: ICD-10-PCS | Mod: 59,51,T6, | Performed by: ORTHOPAEDIC SURGERY

## 2020-11-18 PROCEDURE — 36415 COLL VENOUS BLD VENIPUNCTURE: CPT | Mod: PO

## 2020-11-18 PROCEDURE — C9290 INJ, BUPIVACAINE LIPOSOME: HCPCS | Mod: PO | Performed by: ANESTHESIOLOGY

## 2020-11-18 PROCEDURE — 37000009 HC ANESTHESIA EA ADD 15 MINS: Mod: PO | Performed by: ORTHOPAEDIC SURGERY

## 2020-11-18 PROCEDURE — 76000 FLUOROSCOPY <1 HR PHYS/QHP: CPT | Mod: TC,PO,59

## 2020-11-18 PROCEDURE — 63600175 PHARM REV CODE 636 W HCPCS: Mod: PO | Performed by: NURSE ANESTHETIST, CERTIFIED REGISTERED

## 2020-11-18 PROCEDURE — C1769 GUIDE WIRE: HCPCS | Mod: PO | Performed by: ORTHOPAEDIC SURGERY

## 2020-11-18 PROCEDURE — 28153: ICD-10-PCS | Mod: 59,51,T8, | Performed by: ORTHOPAEDIC SURGERY

## 2020-11-18 PROCEDURE — C1713 ANCHOR/SCREW BN/BN,TIS/BN: HCPCS | Mod: PO | Performed by: ORTHOPAEDIC SURGERY

## 2020-11-18 PROCEDURE — C1776 JOINT DEVICE (IMPLANTABLE): HCPCS | Mod: PO | Performed by: ORTHOPAEDIC SURGERY

## 2020-11-18 PROCEDURE — 71000015 HC POSTOP RECOV 1ST HR: Mod: PO | Performed by: ORTHOPAEDIC SURGERY

## 2020-11-18 PROCEDURE — 76942 ECHO GUIDE FOR BIOPSY: CPT | Mod: 26,,, | Performed by: ANESTHESIOLOGY

## 2020-11-18 PROCEDURE — D9220A PRA ANESTHESIA: ICD-10-PCS | Mod: CRNA,,, | Performed by: NURSE ANESTHETIST, CERTIFIED REGISTERED

## 2020-11-18 PROCEDURE — 28153 PARTIAL REMOVAL OF TOE: CPT | Mod: 59,51,T8, | Performed by: ORTHOPAEDIC SURGERY

## 2020-11-18 PROCEDURE — 28740 FUSION OF FOOT BONES: CPT | Mod: RT,,, | Performed by: ORTHOPAEDIC SURGERY

## 2020-11-18 PROCEDURE — 82306 VITAMIN D 25 HYDROXY: CPT

## 2020-11-18 PROCEDURE — 25000003 PHARM REV CODE 250: Mod: PO | Performed by: ORTHOPAEDIC SURGERY

## 2020-11-18 DEVICE — TOE HAMMERTOE MEDIUM: Type: IMPLANTABLE DEVICE | Site: TOE | Status: FUNCTIONAL

## 2020-11-18 DEVICE — STAPLE BONE 20X20X20 EASYCLIP: Type: IMPLANTABLE DEVICE | Site: TOE | Status: FUNCTIONAL

## 2020-11-18 RX ORDER — BUPIVACAINE HYDROCHLORIDE 5 MG/ML
INJECTION, SOLUTION EPIDURAL; INTRACAUDAL
Status: COMPLETED | OUTPATIENT
Start: 2020-11-18 | End: 2020-11-18

## 2020-11-18 RX ORDER — KETAMINE HYDROCHLORIDE 100 MG/ML
INJECTION, SOLUTION INTRAMUSCULAR; INTRAVENOUS
Status: DISCONTINUED | OUTPATIENT
Start: 2020-11-18 | End: 2020-11-18

## 2020-11-18 RX ORDER — EPHEDRINE SULFATE 50 MG/ML
INJECTION, SOLUTION INTRAVENOUS
Status: DISCONTINUED | OUTPATIENT
Start: 2020-11-18 | End: 2020-11-18

## 2020-11-18 RX ORDER — LIDOCAINE HYDROCHLORIDE 10 MG/ML
1 INJECTION, SOLUTION EPIDURAL; INFILTRATION; INTRACAUDAL; PERINEURAL ONCE
Status: ACTIVE | OUTPATIENT
Start: 2020-11-18

## 2020-11-18 RX ORDER — PROPOFOL 10 MG/ML
VIAL (ML) INTRAVENOUS
Status: DISCONTINUED | OUTPATIENT
Start: 2020-11-18 | End: 2020-11-18

## 2020-11-18 RX ORDER — DEXAMETHASONE SODIUM PHOSPHATE 4 MG/ML
INJECTION, SOLUTION INTRA-ARTICULAR; INTRALESIONAL; INTRAMUSCULAR; INTRAVENOUS; SOFT TISSUE
Status: DISCONTINUED | OUTPATIENT
Start: 2020-11-18 | End: 2020-11-18

## 2020-11-18 RX ORDER — ONDANSETRON 4 MG/1
8 TABLET, ORALLY DISINTEGRATING ORAL EVERY 8 HOURS PRN
Qty: 6 TABLET | Refills: 1 | Status: SHIPPED | OUTPATIENT
Start: 2020-11-18 | End: 2021-02-23

## 2020-11-18 RX ORDER — OXYCODONE HYDROCHLORIDE 5 MG/1
5 TABLET ORAL ONCE AS NEEDED
Status: DISCONTINUED | OUTPATIENT
Start: 2020-11-18 | End: 2020-11-18 | Stop reason: HOSPADM

## 2020-11-18 RX ORDER — CLINDAMYCIN PHOSPHATE 900 MG/50ML
900 INJECTION, SOLUTION INTRAVENOUS
Status: COMPLETED | OUTPATIENT
Start: 2020-11-18 | End: 2020-11-18

## 2020-11-18 RX ORDER — FENTANYL CITRATE 50 UG/ML
25 INJECTION, SOLUTION INTRAMUSCULAR; INTRAVENOUS EVERY 5 MIN PRN
Status: DISCONTINUED | OUTPATIENT
Start: 2020-11-18 | End: 2020-11-18 | Stop reason: HOSPADM

## 2020-11-18 RX ORDER — ROCURONIUM BROMIDE 10 MG/ML
INJECTION, SOLUTION INTRAVENOUS
Status: DISCONTINUED | OUTPATIENT
Start: 2020-11-18 | End: 2020-11-18

## 2020-11-18 RX ORDER — SODIUM CHLORIDE 0.9 % (FLUSH) 0.9 %
3 SYRINGE (ML) INJECTION
Status: DISCONTINUED | OUTPATIENT
Start: 2020-11-18 | End: 2020-11-18 | Stop reason: HOSPADM

## 2020-11-18 RX ORDER — NEOSTIGMINE METHYLSULFATE 1 MG/ML
INJECTION, SOLUTION INTRAVENOUS
Status: DISCONTINUED | OUTPATIENT
Start: 2020-11-18 | End: 2020-11-18

## 2020-11-18 RX ORDER — FENTANYL CITRATE 50 UG/ML
INJECTION, SOLUTION INTRAMUSCULAR; INTRAVENOUS
Status: DISCONTINUED | OUTPATIENT
Start: 2020-11-18 | End: 2020-11-18

## 2020-11-18 RX ORDER — SODIUM CHLORIDE, SODIUM LACTATE, POTASSIUM CHLORIDE, CALCIUM CHLORIDE 600; 310; 30; 20 MG/100ML; MG/100ML; MG/100ML; MG/100ML
INJECTION, SOLUTION INTRAVENOUS CONTINUOUS
Status: DISPENSED | OUTPATIENT
Start: 2020-11-18

## 2020-11-18 RX ORDER — OXYCODONE AND ACETAMINOPHEN 10; 325 MG/1; MG/1
1 TABLET ORAL EVERY 6 HOURS PRN
Qty: 24 TABLET | Refills: 0 | Status: SHIPPED | OUTPATIENT
Start: 2020-11-18 | End: 2021-02-23

## 2020-11-18 RX ORDER — ACETAMINOPHEN 10 MG/ML
INJECTION, SOLUTION INTRAVENOUS
Status: DISCONTINUED | OUTPATIENT
Start: 2020-11-18 | End: 2020-11-18

## 2020-11-18 RX ORDER — FENTANYL CITRATE 50 UG/ML
50 INJECTION, SOLUTION INTRAMUSCULAR; INTRAVENOUS ONCE
Status: COMPLETED | OUTPATIENT
Start: 2020-11-18 | End: 2020-11-18

## 2020-11-18 RX ORDER — SODIUM CHLORIDE 9 MG/ML
INJECTION, SOLUTION INTRAVENOUS CONTINUOUS
Status: DISCONTINUED | OUTPATIENT
Start: 2020-11-18 | End: 2020-11-18

## 2020-11-18 RX ORDER — MUPIROCIN 20 MG/G
OINTMENT TOPICAL
Status: DISCONTINUED | OUTPATIENT
Start: 2020-11-18 | End: 2020-11-18 | Stop reason: HOSPADM

## 2020-11-18 RX ORDER — ONDANSETRON 2 MG/ML
INJECTION INTRAMUSCULAR; INTRAVENOUS
Status: DISCONTINUED | OUTPATIENT
Start: 2020-11-18 | End: 2020-11-18

## 2020-11-18 RX ORDER — LIDOCAINE HCL/PF 100 MG/5ML
SYRINGE (ML) INTRAVENOUS
Status: DISCONTINUED | OUTPATIENT
Start: 2020-11-18 | End: 2020-11-18

## 2020-11-18 RX ORDER — MIDAZOLAM HYDROCHLORIDE 1 MG/ML
2 INJECTION INTRAMUSCULAR; INTRAVENOUS ONCE
Status: COMPLETED | OUTPATIENT
Start: 2020-11-18 | End: 2020-11-18

## 2020-11-18 RX ADMIN — MUPIROCIN: 20 OINTMENT TOPICAL at 06:11

## 2020-11-18 RX ADMIN — ONDANSETRON 4 MG: 2 INJECTION, SOLUTION INTRAMUSCULAR; INTRAVENOUS at 08:11

## 2020-11-18 RX ADMIN — FENTANYL CITRATE 50 MCG: 50 INJECTION, SOLUTION INTRAMUSCULAR; INTRAVENOUS at 06:11

## 2020-11-18 RX ADMIN — DEXAMETHASONE SODIUM PHOSPHATE 4 MG: 4 INJECTION, SOLUTION INTRAMUSCULAR; INTRAVENOUS at 07:11

## 2020-11-18 RX ADMIN — CLINDAMYCIN IN 5 PERCENT DEXTROSE 900 MG: 18 INJECTION, SOLUTION INTRAVENOUS at 06:11

## 2020-11-18 RX ADMIN — NEOSTIGMINE METHYLSULFATE 4 MG: 1 INJECTION INTRAVENOUS at 08:11

## 2020-11-18 RX ADMIN — MIDAZOLAM 2 MG: 1 INJECTION INTRAMUSCULAR; INTRAVENOUS at 06:11

## 2020-11-18 RX ADMIN — ACETAMINOPHEN 1000 MG: 10 INJECTION, SOLUTION INTRAVENOUS at 07:11

## 2020-11-18 RX ADMIN — SODIUM CHLORIDE, SODIUM LACTATE, POTASSIUM CHLORIDE, AND CALCIUM CHLORIDE: .6; .31; .03; .02 INJECTION, SOLUTION INTRAVENOUS at 08:11

## 2020-11-18 RX ADMIN — FENTANYL CITRATE 50 MCG: 50 INJECTION, SOLUTION INTRAMUSCULAR; INTRAVENOUS at 07:11

## 2020-11-18 RX ADMIN — ROCURONIUM BROMIDE 5 MG: 10 INJECTION, SOLUTION INTRAVENOUS at 08:11

## 2020-11-18 RX ADMIN — ROCURONIUM BROMIDE 5 MG: 10 INJECTION, SOLUTION INTRAVENOUS at 07:11

## 2020-11-18 RX ADMIN — BUPIVACAINE 20 ML: 13.3 INJECTION, SUSPENSION, LIPOSOMAL INFILTRATION at 06:11

## 2020-11-18 RX ADMIN — KETAMINE HYDROCHLORIDE 25 MG: 100 INJECTION, SOLUTION, CONCENTRATE INTRAMUSCULAR; INTRAVENOUS at 07:11

## 2020-11-18 RX ADMIN — PROPOFOL 175 MG: 10 INJECTION, EMULSION INTRAVENOUS at 07:11

## 2020-11-18 RX ADMIN — EPHEDRINE SULFATE 5 MG: 50 INJECTION INTRAVENOUS at 07:11

## 2020-11-18 RX ADMIN — BUPIVACAINE HYDROCHLORIDE 10 ML: 5 INJECTION, SOLUTION EPIDURAL; INTRACAUDAL; PERINEURAL at 07:11

## 2020-11-18 RX ADMIN — SODIUM CHLORIDE, SODIUM LACTATE, POTASSIUM CHLORIDE, AND CALCIUM CHLORIDE: .6; .31; .03; .02 INJECTION, SOLUTION INTRAVENOUS at 06:11

## 2020-11-18 RX ADMIN — ROCURONIUM BROMIDE 40 MG: 10 INJECTION, SOLUTION INTRAVENOUS at 07:11

## 2020-11-18 RX ADMIN — LIDOCAINE HYDROCHLORIDE 100 MG: 20 INJECTION PARENTERAL at 07:11

## 2020-11-18 NOTE — H&P
"HPI: Sandhya Madrigal is a 74 y.o. female who was referred to me by Dr. Asha Taylor and was seen in consultation today for chronic right foot pain.  No history of injury or trauma. She did have an ankle injury on 10/4 which has now resolved. She is here for evaluation of her bunion which has been worsening since 2015. Pt denies weakness, numbness, and tingling. She rates her pain as 0/10 today. The pain is worse with closed toe shoe wear.    She has tried shoe modification without relief.      PAST MEDICAL/SURGICAL/FAMILY/SOCIAL/ HISTORY: REVIEWED    ALLERGIES/MEDICATIONS: REVIEWED         Review of Systems:     Constitution: Negative.   HEENT: Negative.   Eyes: Negative.   Cardiovascular: Negative.   Respiratory: Negative.   Endocrine: Negative.   Hematologic/Lymphatic: Negative.   Skin: Negative.   Musculoskeletal: See HPI  Gastrointestinal: Negative.   Genitourinary: Negative.   Neurological: Negative.   Psychiatric/Behavioral: Negative.   Allergic/Immunologic: Negative.         PHYSICAL EXAM:  There were no vitals filed for this visit.      Ht Readings from Last 1 Encounters:   10/08/20 5' 5" (1.651 m)          Wt Readings from Last 1 Encounters:   10/08/20 65.8 kg (145 lb 1 oz)      GENERAL: Well developed, well nourished, no acute distress.  SKIN: Skin is intact. No atrophy, abrasions or lesions are noted.   Neurological: Normal mental status. Appropriate and conversant. Alert and oriented x 3.  GAIT: Walks with non-antalgic gait.     Right lower extremity compared with LLE:  2+ dorsalis pedis pulse.  Capillary refill < 3 seconds.  Normal range of motion tibiotalar and subtalar joints. 5/5 strength EHL, FHL, tibialis anterior, gastrocsoleus, tibialis posterior and peroneals. Sensation to light touch intact sural, saphenous, superficial peroneal and deep peroneal nerves. No lymphadenopathy, no masses or tumors palpated.  Severe hallux valgus with 2nd toe hammertoe with crossing of the toe over the hallux,  " tenderness to palpation under 2nd metatarsal head, 4th toe hard corn lateral PIPJ that is tender to palpation. Dystrophic 5th toenail.      XRAYS:   3 views of right foot obtained and reviewed today reveal severe hallux valgus with ARTHUR 20 and HVA 40        ASSESSMENT:           Encounter Diagnosis   Name Primary?    Right ankle pain, unspecified chronicity Yes      Right hallux valgus with bunion, 2nd toe hammertoe, 4th toe corn    PLAN:   I spent 45 minutes in consulation with the patient today. More than half the time was spent counseling the patient on her condition. We discussed surgical tx for the patient's condition at length and in detail including post-operative course, complications, outcomes and prognosis using diagrams and models where appropriate. Informed consent was obtained, patient understands risks and benefits of procedure.   I recommend right hallux valgus correction with 1st  tarsometatarsal joint Lapidus fusion, bunionectomy, distal soft tissue release, 2nd toe hammertoe correction with EDB tendon transfer, partial condylectomy 4th toe to treat corn. To OR 11/18/20.

## 2020-11-18 NOTE — ANESTHESIA PROCEDURE NOTES
Intubation  Performed by: Evelyn Rouse CRNA  Authorized by: Adolfo Gross MD     Intubation:     Induction:  Intravenous    Intubated:  Postinduction    Mask Ventilation:  Easy mask    Attempts:  1    Attempted By:  CRNA    Blade:  Suazo 2    Laryngeal View Grade: Grade I - full view of chords      Difficult Airway Encountered?: No      Complications:  None    Airway Device:  Oral endotracheal tube    Airway Device Size:  7.0    Style/Cuff Inflation:  Cuffed    Secured at:  The lips    Placement Verified By:  Capnometry    Complicating Factors:  None

## 2020-11-18 NOTE — ANESTHESIA POSTPROCEDURE EVALUATION
Anesthesia Post Evaluation    Patient: Sandhya Madrigal    Procedure(s) Performed: Procedure(s) (LRB):  CORRECTION, HAMMER TOE-SECOND TOE (Right)  BUNIONECTOMY (Right)  BUNIONECTOMY, LAPIDUS (Right)  OSTECTOMY, METATARSAL BONE, HEAD (Right)  CONDYLECTOMY (Right)    Final Anesthesia Type: general    Patient location during evaluation: PACU  Patient participation: Yes- Able to Participate  Level of consciousness: awake and alert and oriented  Post-procedure vital signs: reviewed and stable  Pain management: adequate  Airway patency: patent  THU mitigation strategies: Use of major conduction anesthesia (spinal/epidural) or peripheral nerve block, Multimodal analgesia, Extubation while patient is awake and Verification of full reversal of neuromuscular block  PONV status at discharge: No PONV  Anesthetic complications: no      Cardiovascular status: blood pressure returned to baseline  Respiratory status: unassisted, spontaneous ventilation and room air  Hydration status: euvolemic  Follow-up not needed.          Vitals Value Taken Time   /62 11/18/20 1030   Temp 36.3 °C (97.3 °F) 11/18/20 0910   Pulse 61 11/18/20 1030   Resp 20 11/18/20 1030   SpO2 98 % 11/18/20 1030         Event Time   Out of Recovery 10:15:00         Pain/Emreald Score: Pain Rating Prior to Med Admin: 2 (11/18/2020  6:55 AM)  Emerald Score: 10 (11/18/2020 10:40 AM)

## 2020-11-18 NOTE — BRIEF OP NOTE
OPERATIVE NOTE      DATE: 11/18/2020    TIME: 8:48 AM      PATIENT NAME: Sandhya Madrigal      PRE-OPERATIVE DIAGNOSIS: 1) Right hallux valgus with bunion 2) Right 2nd toe hammertoe 3) Right 4th toe corn 4) Right 2nd  metatarsalphalangeal joint dislocation     POST-OPERATIVE DIAGNOSIS:  1) Right hallux valgus with bunion 2) Right 2nd toe hammertoe 3) Right 4th toe corn 4) Right 2nd  metatarsalphalangeal joint dislocation     PROCEDURE: 1) Right bunionectomy and modified Cedeno distal soft tissue release 2) Right [id number] tarsometatarsal joint arthrodesis 3) Right 4th toe condylectomy proximal and middle phalanges 4) Open reduction internal fixation Right 2nd metatarsalphalangeal joint dislocation 5) Right 2nd toe hammertoe correction     SURGEON: Zafar Saldivar MD     ANESTHESIA TYPE: GETA      SPECIMENS SENT: NONE      COMPLICATIONS: NONE      BLOOD LOSS: 10 cc      ASSISTANT: KYLE Holloway

## 2020-11-18 NOTE — OR NURSING
Right popliteal single shot block complete per Dr. Gross with Anesthesia. Dr. Knowles at bedside to assist Dr. Gross. Pt tolerated well. See Anesthesia record for procedural notes. See flowsheet and MAR for vitals and medications. Monitors in place, alarms audible. VSS. etCO2 monitoring in place. Resp even, unlabored. Skin warm, dry. Pt in NAD. Report given to TOM Britt RN and MESSI Rouse CRNA. Pt to OR with surgery team.

## 2020-11-18 NOTE — ANESTHESIA PREPROCEDURE EVALUATION
11/18/2020  Sandhya Madrigal is a 75 y.o., female.    Anesthesia Evaluation          Review of Systems  Anesthesia Hx:  No problems with previous Anesthesia   Social:  Non-Smoker    Cardiovascular:   Exercise tolerance: good    Pulmonary:  Pulmonary Normal    Renal/:   Chronic Renal Disease, CRI    Hepatic/GI:  Hepatic/GI Normal    Neurological:  Neurology Normal    Endocrine:   Hypothyroidism        Physical Exam  General:  Well nourished    Airway/Jaw/Neck:  Airway Findings: Mouth Opening: Normal Tongue: Normal  General Airway Assessment: Adult  Mallampati: II  TM Distance: Normal, at least 6 cm       Chest/Lungs:  Chest/Lungs Clear    Heart/Vascular:  Heart Findings: Normal            Anesthesia Plan  Type of Anesthesia, risks & benefits discussed:  Anesthesia Type:  general  Patient's Preference:   Intra-op Monitoring Plan: standard ASA monitors  Intra-op Monitoring Plan Comments:   Post Op Pain Control Plan: multimodal analgesia, IV/PO Opioids PRN and peripheral nerve block  Post Op Pain Control Plan Comments:   Induction:   IV  Beta Blocker:  Patient is not currently on a Beta-Blocker (No further documentation required).       Informed Consent: Patient understands risks and agrees with Anesthesia plan.  Questions answered. Anesthesia consent signed with patient.  ASA Score: 2     Day of Surgery Review of History & Physical:    H&P update referred to the surgeon.     Anesthesia Plan Notes: I have personally evaluated the patient and discussed risk/benefits/alternatives of general anesthesia.        Ready For Surgery From Anesthesia Perspective.

## 2020-11-18 NOTE — TRANSFER OF CARE
"Anesthesia Transfer of Care Note    Patient: Sandhya Madrigal    Procedure(s) Performed: Procedure(s) (LRB):  CORRECTION, HAMMER TOE-SECOND TOE (Right)  BUNIONECTOMY (Right)  BUNIONECTOMY, LAPIDUS (Right)  OSTECTOMY, METATARSAL BONE, HEAD (Right)  CONDYLECTOMY (Right)    Patient location: PACU    Anesthesia Type: general and regional    Transport from OR: Transported from OR on room air with adequate spontaneous ventilation    Post pain: adequate analgesia    Post assessment: no apparent anesthetic complications and tolerated procedure well    Post vital signs: stable    Level of consciousness: awake    Nausea/Vomiting: no nausea/vomiting    Complications: none    Transfer of care protocol was followed      Last vitals:   Visit Vitals  BP (!) 158/66 (BP Location: Left arm, Patient Position: Lying)   Pulse (!) 54   Temp 36.6 °C (97.9 °F) (Skin)   Resp 17   Ht 5' 5.5" (1.664 m)   Wt 65.8 kg (145 lb)   SpO2 100%   Breastfeeding No   BMI 23.76 kg/m²     "

## 2020-11-18 NOTE — OP NOTE
OPERATIVE NOTE      DATE: 11/18/2020    TIME: 8:48 AM      PATIENT NAME: Sandhya Madrigal      PRE-OPERATIVE DIAGNOSIS: 1) Right hallux valgus with bunion 2) Right 2nd toe hammertoe 3) Right 4th toe corn 4) Right 2nd  metatarsalphalangeal joint dislocation     POST-OPERATIVE DIAGNOSIS:  1) Right hallux valgus with bunion 2) Right 2nd toe hammertoe 3) Right 4th toe corn 4) Right 2nd  metatarsalphalangeal joint dislocation     PROCEDURE: 1) Right bunionectomy and modified Cedeno distal soft tissue release 2) Right [id number] tarsometatarsal joint arthrodesis 3) Right 4th toe condylectomy proximal and middle phalanges 4) Open reduction internal fixation Right 2nd metatarsalphalangeal joint dislocation 5) Right 2nd toe hammertoe correction     SURGEON: Zafar Saldivar MD     ANESTHESIA TYPE: GETA      SPECIMENS SENT: NONE      COMPLICATIONS: NONE      BLOOD LOSS: 10 cc      ASSISTANT: KYLE Holloway       Procedure in detail:  After appropriate informed consent was obtained the patient was taken to the OR and placed in the supine position  on the operating table. The Right lower extremity was prepped and draped in the usual sterile fashion. The tourniquet was raised to 300 mmHg after esmarch exsanguination of the limb.     First an approximately 2.5-cm incision was made longitudinally using a #15 blade overlying the second webspace on the dorsum of the foot.  Seine rake retractors were placed and Littler scissors were used to dissect down to the level of the adductor tendon which was released using sharp dissection. The sesamoid was also identified and released from the surrounding tissue.  The lateral capsule of the first metatarsalphalangeal joint was also released in order to complete the distal soft tissue release. Adequate release was confirmed with squeeze test.       Attention was then turned to the medial aspect of the great toe where a 12 cm dorsomedial incision was made inline with the previous old incision  using a #15 blade.  The capsule of the 1st metatarsalphalangeal joint was incised in a longitudinal fashion and retracted using seine rake.  The medial eminence was excised using a sagittal saw.  There was noted to be some early degenerative changes on the medial aspect of the head of the 1st metatarsal.  Next the 1st tarsometatarsal joint was dissected out and joint distractor was applied. The 1st tarsometatarsal joint was decorticated using sagittal saw, osteotomes, curettes, and mei.  A belcher clamp was placed around the 2nd metatarsal neck and into the distal aspect of the 1st metatarsal and used to reduce and correct the deformity.      Next a 20 mm [place] staple was placed at the dorsal lateral aspect of the 1st tarsometatarsal joint.  Next a [place] 2-hole Lapidus staple plate was placed on the dorsomedial aspect of the 1st tarsometatarsal joint.  Next the plate was aligned with joint and then two olive wires were placed to hold the plate in position and one 3.5 mm non-locking screw was placed distally and then the compression staple was pre-drilled and a 14 mm compression staple was placed across the joint through the plate with excellent compression of the 1st tarsometatarsal joint. One additional 3.5 mm non-locking screw was placed distally in order to allow for continuous compression of the joint. AP/Lateral/Oblique views of the foot under fluoroscopy showed good alignment and good position of the staples, plate, and screws.     Next a separate 5 cm incision was made overlying the dorsum of the 2nd toe and 2nd webspace and littler scissors were used to dissect down to the level of the 2nd  metatarsalphalangeal joint. The extensor digitorum brevis tendon was released. The extensor digitorum longus tendon was lengthened in a Z-type fashion. The contracted medial soft tissue was released and capsulotomy of the 2nd metatarsalphalangeal joint was performed.  Proximal interphalangeal joint resection  arthroplasty was performed using a sagittal saw.  Next a medium Swati toe tac was placed into the PIPJ after prepping the joint. Next the 2nd  metatarsalphalangeal joint was reduced and then the  proximal interphalangeal joint was pinned from the tip of the 2nd toe across the 2nd metatarsalphalangeal joint using a 0.045 K-wire in a retrograde fashion in order to maintain reduction of the joints. There was good alignment of the joints and good position of wire on AP/LAT/Oblique views of the foot under fluoroscopy.  The K-wire was cut and jurgan's ball was placed.    Next a separate 1.5 cm longitudinal incision was made on the on the plantar aspect of the 2nd toe. Dissection was carried down to the flexor digitorum longus tendon which was split longitudinally using a #15 blade and then transected as far distally as possible to complete the flexor tenotomy.  The two ends were each tagged using 3.0 monocryl suture.  Two mosquito hemostats were used to pass the ends of the suture through from plantar to dorsal for the flexor to extensor tendon transfer. The two ends of the flexor digitorum longus tendon were sutured to the extensor digitorum longus tendon using 3.0 monocryl.  The ends of the extensor digitorum longus tendon were re-approximated using 3.0 moncryl in an interrupted fashion.    Next a separate 2 cm incision was made on the lateral aspect of the 4th toe and partial condylectomy of the head of the proximal phalanx, base of middle phalanx and base of distal phalanx was performed in order to treat the painful corn of the 4th toe.     The incisions were irrigated with normal saline. The tourniquet was let down, adequate hemostasis was achieved using bovie cautery. The medial capsulorraphy was performed using 0 monocryl  interrupted sutures which were passed through two small drill holes in the distal 1st metatarsal neck and then passed through the capsule which was then tied down to the bone with the toe in  neutral alignment correcting his pronation of the hallux as well.   The deep layer of the incisions were re-approximated using 2.0-monocyrl in an interrupted fashion. The subcutaneous layers was re-approximated using 3.0-monocyrl in an interrupted fashion. The skin was re-approximated using 3.0 nylon in a running fashion.  Sterile dressing using xeroform, bacitracin, 4x8s, cast padding, posterior splint and stirrups were placed. Patient tolerated the procedure well without complications.  I was present and scrubbed for the entire case.

## 2020-11-18 NOTE — DISCHARGE INSTRUCTIONS
Post Operative Instructions    Cast:     You will have a splint on the leg following surgery    The splint will remain in place for  2 weeks    You will be non-weightbearing in the splint and cast for 6-8 weeks    Wound:    The surgical incision has been closed with sutures    Do not get the dressing/splint wet and do not remove the dressing/splint for 2 weeks. When showering place a bag over the dressing and secure with tape or rubberband to your leg to avoid the cast and wound getting wet and still put the leg out of the shower      Do not remove the dressing until your 2 week appointment - the first dressing change will occur at your 2 week appointment    Stitches will be removed at your 2 week appointment if the incision is healed    A cast will be applied at your 2 week visit    Once the cast is removed at 6-8 weeks you will begin Weight bearing as tolerated in a CAM Boot    You can shower and wash the foot once you are in the boot    You do not have to sleep in the boot    Do not immerse the foot in water (bath, hot tub, pool, lake, pond, river, ocean) for 4 weeks    You will apply scar cream and pain cream together to the incisions once in the boot    Weight Bearing:    You will be non-weight bearing for the first two weeks. You will be given crutches and knee scooter or wheelchair     You will be non-weightbearing for 6-8 weeks depending on how your bones are healing.     After 6-8 weeks you may begin fully walking on the foot if the bones are healed    Medications:    You will be given a prescription for pain medication     Pain medication should be used regularly for the first 24-48 hours, when required for the first 1 to 2 weeks, followed by Regular Tylenol     Driving:     For right foot surgery you are not permitted to drive for 8-10 weeks    For left foot surgery, please contact your insurance company to see if you are permitted to drive    Driving is not permitted while on narcotics    Work:    Two  weeks off work is recommended for initial recovery    If you are able to get to work safely, and will be seated with the foot elevated for the majority of the day, you may return to work a couple days after surgery. This is assuming you are not taking narcotic pain mediation.    From 2-6 weeks sedentary duties is recommended    By 10-12 weeks you can slowly return to your normal duties    If your job is physically demanding, return to full duties is usually possible around 12 weeks post operatively    Follow Up:    You will have your first appointment 2 weeks after surgery in the Clinic    You will have follow up appointments every 2 weeks until you get into the boot    Recovery:    It is normal to experience mild to moderate pain, numbness, or tingling for the first 2 weeks following surgery    Please come to the emergency department if you are suffering from severe pain    You will get back to most of your activities by 3-6 months    Swelling often remains for 6-12 months    You are expected to experience a maximal improvement from surgery in 9-12 months    Physiotherapy:    Formal physiotherapy is necessary and will usually be twice a week for 6-8 weeks    Do not remove your dressing or let anyone else including a physician remove your dressing unless otherwise instructed by Dr. Saldivar. You may over wrap the dressing if there is any blood or fluid oozing through the ace bandage.     Exparel(bupivacaine) has been injected to provide approximately 72 hours of reduced pain after your surgery.  Do not remove the bracelet for five days  Report to your doctor as soon as possible the following:   Restlessness   Anxiety   Speech problems,    Lightheadedness   Numbness and tingling of the mouth and lips   Seizures    Metallic taste   Blurred vision   Tremors    Twitching   Depression   Extreme drowsiness  Avoid additional use of local anesthetics (such as dental procedures) for five days (96 hours)

## 2020-11-18 NOTE — PLAN OF CARE
Patient tolerating oral liquids without difficulty. No apparent s&s of distress noted at this time, no complaints voiced at this time. Discharge instructions reviewed with patient s/o with good verbal feedback received. Patient ready for discharge

## 2020-11-18 NOTE — ANESTHESIA PROCEDURE NOTES
Popliteal Sciatic Single Injection Block    Patient location during procedure: pre-op   Block not for primary anesthetic.  Reason for block: at surgeon's request and post-op pain management   Post-op Pain Location: right foot  Start time: 11/18/2020 6:56 AM  Timeout: 11/18/2020 6:56 AM   End time: 11/18/2020 6:58 AM    Staffing  Authorizing Provider: Adolfo Gross MD  Performing Provider: Adolfo Gross MD    Preanesthetic Checklist  Completed: patient identified, site marked, surgical consent, pre-op evaluation, timeout performed, IV checked, risks and benefits discussed and monitors and equipment checked  Peripheral Block  Patient position: supine  Prep: ChloraPrep  Patient monitoring: heart rate, cardiac monitor, continuous pulse ox, continuous capnometry and frequent blood pressure checks  Block type: popliteal  Laterality: right  Injection technique: single shot  Needle  Needle type: Stimuplex   Needle gauge: 21 G  Needle length: 4 in  Needle localization: anatomical landmarks and ultrasound guidance   -ultrasound image captured on disc.  Assessment  Injection assessment: negative aspiration, negative parasthesia and local visualized surrounding nerve  Paresthesia pain: none  Heart rate change: no  Slow fractionated injection: yes  Additional Notes  VSS.  [facility] RN monitoring vitals throughout procedure.  Patient tolerated procedure well.

## 2020-11-18 NOTE — DISCHARGE SUMMARY
"[facility]  Discharge Note  Short Stay    Procedure(s) (LRB):  CORRECTION, HAMMER TOE-SECOND TOE (Right)  BUNIONECTOMY (Right)  BUNIONECTOMY, LAPIDUS (Right)  OSTECTOMY, METATARSAL BONE, HEAD (Right)  CONDYLECTOMY (Right)    OUTCOME: Patient tolerated treatment/procedure well without complication and is now ready for discharge.    DISPOSITION: Home or Self Care    FINAL DIAGNOSIS:  Right hallux valgus    FOLLOWUP: In clinic  On 12/1/20    DISCHARGE INSTRUCTIONS:    Discharge Procedure Orders   CRUTCHES FOR HOME USE     Order Specific Question Answer Comments   Type: Axillary    Height: 5' 5.5" (1.664 m)    Weight: 65.8 kg (145 lb)    Length of need (1-99 months): 2      Diet general     Call MD for:  temperature >100.4     Call MD for:  persistent nausea and vomiting     Call MD for:  severe uncontrolled pain     Call MD for:  difficulty breathing, headache or visual disturbances     Call MD for:  redness, tenderness, or signs of infection (pain, swelling, redness, odor or green/yellow discharge around incision site)     Call MD for:  hives     Call MD for:  persistent dizziness or light-headedness     Call MD for:  extreme fatigue     Keep surgical extremity elevated     No driving, operating heavy equipment or signing legal documents while taking pain medication     Leave dressing on - Keep it clean, dry, and intact until clinic visit     Non weight bearing         "

## 2020-11-19 VITALS
DIASTOLIC BLOOD PRESSURE: 62 MMHG | HEART RATE: 61 BPM | WEIGHT: 145 LBS | RESPIRATION RATE: 20 BRPM | SYSTOLIC BLOOD PRESSURE: 144 MMHG | TEMPERATURE: 97 F | HEIGHT: 66 IN | BODY MASS INDEX: 23.3 KG/M2 | OXYGEN SATURATION: 98 %

## 2020-11-20 ENCOUNTER — TELEPHONE (OUTPATIENT)
Dept: ORTHOPEDICS | Facility: CLINIC | Age: 75
End: 2020-11-20

## 2020-11-20 DIAGNOSIS — M20.41 HAMMERTOE OF SECOND TOE OF RIGHT FOOT: ICD-10-CM

## 2020-11-20 DIAGNOSIS — M20.11 HALLUX VALGUS WITH BUNIONS OF RIGHT FOOT: Primary | ICD-10-CM

## 2020-11-20 DIAGNOSIS — M21.611 HALLUX VALGUS WITH BUNIONS OF RIGHT FOOT: Primary | ICD-10-CM

## 2020-11-20 NOTE — TELEPHONE ENCOUNTER
Called pt back. She would like a wheelchair with elevated leg rest instead of using the knee scooter she purchased. Advised will send order to [facility]. Thanks, Chika

## 2020-11-20 NOTE — TELEPHONE ENCOUNTER
----- Message from Bhaskar Copeland sent at 11/20/2020  9:28 AM CST -----  Regarding: DME question  Contact: pt   call

## 2020-11-30 DIAGNOSIS — M21.611 HALLUX VALGUS WITH BUNIONS OF RIGHT FOOT: Primary | ICD-10-CM

## 2020-11-30 DIAGNOSIS — M20.41 HAMMERTOE OF SECOND TOE OF RIGHT FOOT: ICD-10-CM

## 2020-11-30 DIAGNOSIS — M20.11 HALLUX VALGUS WITH BUNIONS OF RIGHT FOOT: Primary | ICD-10-CM

## 2020-12-01 ENCOUNTER — HOSPITAL ENCOUNTER (OUTPATIENT)
Dept: RADIOLOGY | Facility: HOSPITAL | Age: 75
Discharge: HOME | End: 2020-12-01
Attending: ORTHOPAEDIC SURGERY
Payer: MEDICARE

## 2020-12-01 ENCOUNTER — OFFICE VISIT (OUTPATIENT)
Dept: ORTHOPEDICS | Facility: CLINIC | Age: 75
End: 2020-12-01
Payer: MEDICARE

## 2020-12-01 VITALS
HEART RATE: 63 BPM | SYSTOLIC BLOOD PRESSURE: 170 MMHG | WEIGHT: 145 LBS | HEIGHT: 65 IN | DIASTOLIC BLOOD PRESSURE: 74 MMHG | BODY MASS INDEX: 24.16 KG/M2

## 2020-12-01 DIAGNOSIS — M21.611 HALLUX VALGUS WITH BUNIONS OF RIGHT FOOT: Primary | ICD-10-CM

## 2020-12-01 DIAGNOSIS — M21.611 HALLUX VALGUS WITH BUNIONS OF RIGHT FOOT: ICD-10-CM

## 2020-12-01 DIAGNOSIS — M20.41 HAMMERTOE OF SECOND TOE OF RIGHT FOOT: ICD-10-CM

## 2020-12-01 DIAGNOSIS — M20.11 HALLUX VALGUS WITH BUNIONS OF RIGHT FOOT: Primary | ICD-10-CM

## 2020-12-01 DIAGNOSIS — M20.11 HALLUX VALGUS WITH BUNIONS OF RIGHT FOOT: ICD-10-CM

## 2020-12-01 PROCEDURE — 29405 APPL SHORT LEG CAST: CPT | Mod: PBBFAC,PN | Performed by: ORTHOPAEDIC SURGERY

## 2020-12-01 PROCEDURE — 29405 PR APPLY SHORT LEG CAST: ICD-10-PCS | Mod: S$PBB,58,RT, | Performed by: ORTHOPAEDIC SURGERY

## 2020-12-01 PROCEDURE — 73630 XR FOOT COMPLETE 3 VIEW RIGHT: ICD-10-PCS | Mod: 26,RT,, | Performed by: RADIOLOGY

## 2020-12-01 PROCEDURE — 99999 PR PBB SHADOW E&M-EST. PATIENT-LVL IV: ICD-10-PCS | Mod: PBBFAC,,, | Performed by: ORTHOPAEDIC SURGERY

## 2020-12-01 PROCEDURE — 29405 APPL SHORT LEG CAST: CPT | Mod: S$PBB,58,RT, | Performed by: ORTHOPAEDIC SURGERY

## 2020-12-01 PROCEDURE — 99024 POSTOP FOLLOW-UP VISIT: CPT | Mod: POP,,, | Performed by: ORTHOPAEDIC SURGERY

## 2020-12-01 PROCEDURE — 99024 PR POST-OP FOLLOW-UP VISIT: ICD-10-PCS | Mod: POP,,, | Performed by: ORTHOPAEDIC SURGERY

## 2020-12-01 PROCEDURE — 73630 X-RAY EXAM OF FOOT: CPT | Mod: 26,RT,, | Performed by: RADIOLOGY

## 2020-12-01 PROCEDURE — 99999 PR PBB SHADOW E&M-EST. PATIENT-LVL IV: CPT | Mod: PBBFAC,,, | Performed by: ORTHOPAEDIC SURGERY

## 2020-12-01 PROCEDURE — 99214 OFFICE O/P EST MOD 30 MIN: CPT | Mod: PBBFAC,25,PN | Performed by: ORTHOPAEDIC SURGERY

## 2020-12-01 PROCEDURE — 73630 X-RAY EXAM OF FOOT: CPT | Mod: TC,PO,RT

## 2020-12-01 NOTE — PROGRESS NOTES
Subjective:      Patient ID: Sandhya Madrigal is a 75 y.o. female.    Chief Complaint: Post-op Evaluation of the Right Foot (Right bunionectomy and modified Cedeno distal soft tissue release 2) Right Krossne7ji tarsometatarsal joint arthrodesis 3) Right 4th toe condylectomy proximal and middle phalanges 4) Open reduction internal fixation Right 2nd metatarsalphalangeal joint )    Pt is doing well today. She said she only took one pain pill b/c the block worked so well. She rates her pain as 1/10 today. DOS: 11/18/20    Social History     Occupational History    Not on file   Tobacco Use    Smoking status: Never Smoker    Smokeless tobacco: Never Used   Substance and Sexual Activity    Alcohol use: Yes     Alcohol/week: 0.0 standard drinks     Comment: 1 beer a night    Drug use: No    Sexual activity: Yes     Partners: Male            Objective:    Ortho Exam     RLE: Neurovascularly intact, incisions healing well, moderate to severe swelling, sutures are intact.  No signs of infection. Pin site is Clean, dry, intact. Good correction and alignment.       XRAYS: 3 views of right foot obtained and reviewed today show  Good correction and alignment. Hardware is intact .    Assessment:         Post-op Evaluation of the Right Foot (Right bunionectomy and modified Cedeno distal soft tissue release 2) Right Yswnzig2lt tarsometatarsal joint arthrodesis 3) Right 4th toe condylectomy proximal and middle phalanges 4) Open reduction internal fixation Right 2nd metatarsalphalangeal joint )  Plan:       Sutures left in placed due to swelling. Short leg fiberglass cast applied with toe plate. Non-weightbearing. F/u 2 weeks with xray out of plaster right foot.

## 2020-12-09 DIAGNOSIS — M20.41 HAMMERTOE OF SECOND TOE OF RIGHT FOOT: ICD-10-CM

## 2020-12-09 DIAGNOSIS — M20.11 HALLUX VALGUS WITH BUNIONS OF RIGHT FOOT: Primary | ICD-10-CM

## 2020-12-09 DIAGNOSIS — M21.611 HALLUX VALGUS WITH BUNIONS OF RIGHT FOOT: Primary | ICD-10-CM

## 2020-12-12 ENCOUNTER — PATIENT MESSAGE (OUTPATIENT)
Dept: ORTHOPEDICS | Facility: CLINIC | Age: 75
End: 2020-12-12

## 2020-12-15 ENCOUNTER — HOSPITAL ENCOUNTER (OUTPATIENT)
Dept: RADIOLOGY | Facility: HOSPITAL | Age: 75
Discharge: HOME | End: 2020-12-15
Attending: ORTHOPAEDIC SURGERY
Payer: MEDICARE

## 2020-12-15 ENCOUNTER — OFFICE VISIT (OUTPATIENT)
Dept: ORTHOPEDICS | Facility: CLINIC | Age: 75
End: 2020-12-15
Payer: MEDICARE

## 2020-12-15 VITALS
HEIGHT: 65 IN | WEIGHT: 145.06 LBS | BODY MASS INDEX: 24.17 KG/M2 | SYSTOLIC BLOOD PRESSURE: 183 MMHG | DIASTOLIC BLOOD PRESSURE: 81 MMHG | HEART RATE: 71 BPM

## 2020-12-15 DIAGNOSIS — M20.41 HAMMERTOE OF SECOND TOE OF RIGHT FOOT: ICD-10-CM

## 2020-12-15 DIAGNOSIS — M20.11 HALLUX VALGUS WITH BUNIONS OF RIGHT FOOT: Primary | ICD-10-CM

## 2020-12-15 DIAGNOSIS — M21.611 HALLUX VALGUS WITH BUNIONS OF RIGHT FOOT: ICD-10-CM

## 2020-12-15 DIAGNOSIS — M20.11 HALLUX VALGUS WITH BUNIONS OF RIGHT FOOT: ICD-10-CM

## 2020-12-15 DIAGNOSIS — M21.611 HALLUX VALGUS WITH BUNIONS OF RIGHT FOOT: Primary | ICD-10-CM

## 2020-12-15 PROCEDURE — 99999 PR PBB SHADOW E&M-EST. PATIENT-LVL IV: ICD-10-PCS | Mod: PBBFAC,,, | Performed by: ORTHOPAEDIC SURGERY

## 2020-12-15 PROCEDURE — 99024 POSTOP FOLLOW-UP VISIT: CPT | Mod: POP,,, | Performed by: ORTHOPAEDIC SURGERY

## 2020-12-15 PROCEDURE — 99024 PR POST-OP FOLLOW-UP VISIT: ICD-10-PCS | Mod: POP,,, | Performed by: ORTHOPAEDIC SURGERY

## 2020-12-15 PROCEDURE — 97760 PR ORTHOTIC MGMT&TRAINJ INITIAL ENC EA 15 MINS: ICD-10-PCS | Mod: S$PBB,GP,, | Performed by: ORTHOPAEDIC SURGERY

## 2020-12-15 PROCEDURE — 99999 PR PBB SHADOW E&M-EST. PATIENT-LVL IV: CPT | Mod: PBBFAC,,, | Performed by: ORTHOPAEDIC SURGERY

## 2020-12-15 PROCEDURE — 97760 ORTHOTIC MGMT&TRAING 1ST ENC: CPT | Mod: S$PBB,GP,, | Performed by: ORTHOPAEDIC SURGERY

## 2020-12-15 PROCEDURE — 73630 X-RAY EXAM OF FOOT: CPT | Mod: TC,PO,RT

## 2020-12-15 PROCEDURE — 73630 XR FOOT COMPLETE 3 VIEW RIGHT: ICD-10-PCS | Mod: 26,RT,, | Performed by: RADIOLOGY

## 2020-12-15 PROCEDURE — 99214 OFFICE O/P EST MOD 30 MIN: CPT | Mod: PBBFAC,25,PN | Performed by: ORTHOPAEDIC SURGERY

## 2020-12-15 PROCEDURE — 73630 X-RAY EXAM OF FOOT: CPT | Mod: 26,RT,, | Performed by: RADIOLOGY

## 2020-12-15 NOTE — NURSING
Dr. Saldivar ordered right short leg cast to be removed. Cast removal explained to patient. Patient applied goggles. Right short leg cast intact. Right short leg cast removed without difficulty using the cast saw. Incisions intact. No skin irritation noted. Patient tolerated cast removal well.      Per Dr. Saldivar's orders to remove sutures. Sutures to right lower extremity were removed without any complications. Benzoin tincture applied to outer edges of incision, steri strips applied. Instructed patient to remove steri strips on day 3 if they have not fallen off on own. Patient stated understanding.

## 2020-12-15 NOTE — PROGRESS NOTES
Subjective:      Patient ID: Sandhya Madrigal is a 75 y.o. female.    Chief Complaint: Post-op Evaluation of the Right Foot (DOS 11/18/2020) and Post-op Evaluation (Right bunionectomy and modified Cedeno distal soft tissue release 2) Right Jrpmzjt0ld tarsometatarsal joint arthrodesis 3) Right 4th toe condylectomy proximal and middle phalanges 4) Open reduction internal fixation Right 2nd metatarsalphalangeal joint )    Pt is doing well today.  She rates her pain as 1/10 today. DOS: 11/18/20    Social History     Occupational History    Not on file   Tobacco Use    Smoking status: Never Smoker    Smokeless tobacco: Never Used   Substance and Sexual Activity    Alcohol use: Yes     Alcohol/week: 0.0 standard drinks     Comment: 1 beer a night    Drug use: No    Sexual activity: Yes     Partners: Male            Objective:    Ortho Exam     RLE: Neurovascularly intact, incisions healing well, moderate  swelling, sutures are intact.  No signs of infection. Pin site is Clean, dry, intact. Good correction and alignment. Minimal  tenderness to palpation at the fusion site.       XRAYS: 3 views of right foot obtained and reviewed today show  Good correction and alignment. Hardware is intact . There is interval progression of healing.      Assessment:         Post-op Evaluation of the Right Foot (Right bunionectomy and modified Cedeno distal soft tissue release 2) Right Sidbszp2gs tarsometatarsal joint arthrodesis 3) Right 4th toe condylectomy proximal and middle phalanges 4) Open reduction internal fixation Right 2nd metatarsalphalangeal joint )  Plan:        Sutures were removed today and steri-strips were placed. We performed a custom orthotic/brace adjustment, fitting and training with the patient today. The patient demonstrated understanding and proper care. This was performed for 15 minutes.  Short boot  was given.  Weight bearing as tolerated in boot.  F/u 2 weeks with xray  right foot.

## 2020-12-21 ENCOUNTER — PATIENT MESSAGE (OUTPATIENT)
Dept: ORTHOPEDICS | Facility: CLINIC | Age: 75
End: 2020-12-21

## 2020-12-22 ENCOUNTER — PATIENT MESSAGE (OUTPATIENT)
Dept: ORTHOPEDICS | Facility: CLINIC | Age: 75
End: 2020-12-22

## 2020-12-25 ENCOUNTER — PATIENT MESSAGE (OUTPATIENT)
Dept: ORTHOPEDICS | Facility: CLINIC | Age: 75
End: 2020-12-25

## 2020-12-28 DIAGNOSIS — M21.611 HALLUX VALGUS WITH BUNIONS OF RIGHT FOOT: Primary | ICD-10-CM

## 2020-12-28 DIAGNOSIS — M20.41 HAMMERTOE OF SECOND TOE OF RIGHT FOOT: ICD-10-CM

## 2020-12-28 DIAGNOSIS — M20.11 HALLUX VALGUS WITH BUNIONS OF RIGHT FOOT: Primary | ICD-10-CM

## 2020-12-29 ENCOUNTER — OFFICE VISIT (OUTPATIENT)
Dept: ORTHOPEDICS | Facility: CLINIC | Age: 75
End: 2020-12-29
Payer: MEDICARE

## 2020-12-29 ENCOUNTER — HOSPITAL ENCOUNTER (OUTPATIENT)
Dept: RADIOLOGY | Facility: HOSPITAL | Age: 75
Discharge: HOME | End: 2020-12-29
Attending: ORTHOPAEDIC SURGERY
Payer: MEDICARE

## 2020-12-29 VITALS
BODY MASS INDEX: 24.17 KG/M2 | WEIGHT: 145.06 LBS | DIASTOLIC BLOOD PRESSURE: 87 MMHG | HEIGHT: 65 IN | SYSTOLIC BLOOD PRESSURE: 185 MMHG | HEART RATE: 85 BPM

## 2020-12-29 DIAGNOSIS — M21.611 HALLUX VALGUS WITH BUNIONS OF RIGHT FOOT: ICD-10-CM

## 2020-12-29 DIAGNOSIS — M20.11 HALLUX VALGUS WITH BUNIONS OF RIGHT FOOT: Primary | ICD-10-CM

## 2020-12-29 DIAGNOSIS — M21.611 HALLUX VALGUS WITH BUNIONS OF RIGHT FOOT: Primary | ICD-10-CM

## 2020-12-29 DIAGNOSIS — M20.41 HAMMERTOE OF SECOND TOE OF RIGHT FOOT: ICD-10-CM

## 2020-12-29 DIAGNOSIS — M25.571 RIGHT ANKLE PAIN, UNSPECIFIED CHRONICITY: ICD-10-CM

## 2020-12-29 DIAGNOSIS — M20.11 HALLUX VALGUS WITH BUNIONS OF RIGHT FOOT: ICD-10-CM

## 2020-12-29 PROCEDURE — 99024 POSTOP FOLLOW-UP VISIT: CPT | Mod: POP,,, | Performed by: ORTHOPAEDIC SURGERY

## 2020-12-29 PROCEDURE — 99214 OFFICE O/P EST MOD 30 MIN: CPT | Mod: PBBFAC,25,PN | Performed by: ORTHOPAEDIC SURGERY

## 2020-12-29 PROCEDURE — 73630 X-RAY EXAM OF FOOT: CPT | Mod: 26,RT,, | Performed by: RADIOLOGY

## 2020-12-29 PROCEDURE — 73630 XR FOOT COMPLETE 3 VIEW RIGHT: ICD-10-PCS | Mod: 26,RT,, | Performed by: RADIOLOGY

## 2020-12-29 PROCEDURE — 99999 PR PBB SHADOW E&M-EST. PATIENT-LVL IV: ICD-10-PCS | Mod: PBBFAC,,, | Performed by: ORTHOPAEDIC SURGERY

## 2020-12-29 PROCEDURE — 99024 PR POST-OP FOLLOW-UP VISIT: ICD-10-PCS | Mod: POP,,, | Performed by: ORTHOPAEDIC SURGERY

## 2020-12-29 PROCEDURE — 99999 PR PBB SHADOW E&M-EST. PATIENT-LVL IV: CPT | Mod: PBBFAC,,, | Performed by: ORTHOPAEDIC SURGERY

## 2020-12-29 PROCEDURE — 73630 X-RAY EXAM OF FOOT: CPT | Mod: TC,PO,RT

## 2020-12-29 NOTE — PROGRESS NOTES
Subjective:      Patient ID: Sandhya Madrigal is a 75 y.o. female.    Chief Complaint: Pain and Post-op Evaluation of the Right Foot     Pt is doing fairly well today.  She rates her pain as 3/10 today. DOS: 11/18/20  She is still using the knee scooter and feels that she cannot put weight on the foot in the boot.     Social History     Occupational History    Not on file   Tobacco Use    Smoking status: Never Smoker    Smokeless tobacco: Never Used   Substance and Sexual Activity    Alcohol use: Yes     Alcohol/week: 0.0 standard drinks     Comment: 1 beer a night    Drug use: No    Sexual activity: Yes     Partners: Male            Objective:    Ortho Exam     RLE: Neurovascularly intact, incisions healing well, moderate dependent to severe dependent edema. swelling, sutures are intact.  No signs of infection. Good correction and alignment. no tenderness to palpation at the fusion site. Decreased ROM of the toes. She has a 1-2 cm area of superficial skin necrosis medial 2nd toe which is healing.       XRAYS: 3 views of right foot obtained and reviewed today show  Good correction and alignment. Hardware is intact . There is interval progression of healing.      Assessment:         Post-op Evaluation of the Right Foot (Right bunionectomy and modified Cedeno distal soft tissue release 2) Right [id number] tarsometatarsal joint arthrodesis 3) Right 4th toe condylectomy proximal and middle phalanges 4) Open reduction internal fixation Right 2nd metatarsalphalangeal joint )  Plan:     I instructed her to wash the foot with a washcloth and soap as she has just been using her hand.      Weight bearing as tolerated in darco shoe which was given today. PT 2/6 with  scar tissue massage and desensitization.  She is very hesitant to weight bearing but I reassured her and I think PT will definitely help. I will keep observe the 2nd toe skin. I wrote her an prescription for compression stockings for post-operative swelling  and told her that swelling can last 6 months to a year.  F/u 3 weeks with xray right foot.

## 2020-12-30 ENCOUNTER — TELEPHONE (OUTPATIENT)
Dept: ORTHOPEDICS | Facility: CLINIC | Age: 75
End: 2020-12-30

## 2020-12-30 ENCOUNTER — CLINICAL SUPPORT (OUTPATIENT)
Dept: REHABILITATION | Facility: HOSPITAL | Age: 75
End: 2020-12-30
Attending: ORTHOPAEDIC SURGERY
Payer: MEDICARE

## 2020-12-30 DIAGNOSIS — M21.611 HALLUX VALGUS WITH BUNIONS OF RIGHT FOOT: ICD-10-CM

## 2020-12-30 DIAGNOSIS — R26.9 ABNORMALITY OF GAIT AND MOBILITY: ICD-10-CM

## 2020-12-30 DIAGNOSIS — R26.9 GAIT ABNORMALITY: ICD-10-CM

## 2020-12-30 DIAGNOSIS — M25.571 RIGHT ANKLE PAIN, UNSPECIFIED CHRONICITY: ICD-10-CM

## 2020-12-30 DIAGNOSIS — M20.11 HALLUX VALGUS WITH BUNIONS OF RIGHT FOOT: ICD-10-CM

## 2020-12-30 PROCEDURE — 97110 THERAPEUTIC EXERCISES: CPT | Mod: PO | Performed by: PHYSICAL THERAPIST

## 2020-12-30 PROCEDURE — 97162 PT EVAL MOD COMPLEX 30 MIN: CPT | Mod: PO | Performed by: PHYSICAL THERAPIST

## 2020-12-30 NOTE — PROGRESS NOTES
See evaluation and POC in the treatment section.    Pt/family was provided educational information, including: role of PT, role of pt/caregiver, goals for PT, POC, scheduling, and attendance policy.- pt verbalized understanding.    Edema ankle:  Fig 8  R: 54.5 cm  L: 53 cm

## 2020-12-30 NOTE — TELEPHONE ENCOUNTER
Called pt. Dr. Saldivar ordered compression stockings for pt. Pt asked that I send my chart message with info to her. Thanks, Chika

## 2020-12-30 NOTE — PLAN OF CARE
"[facility]  Physical Therapy Initial Evaluation    Name: Sandhya Madrigal  Clinic Number: [id number]    Therapy Diagnosis:   Encounter Diagnoses   Name Primary?    Hallux valgus with bunions of right foot     Right ankle pain, unspecified chronicity      Physician: Zafar Saldivar MD    Physician Orders: PT Eval and Treat   Post Surgical? Yes   Eval and Treat Yes   Type of Therapy Outpatient Therapy   Athlete No   Dry Needling No   Order comments: 2 TIMES A WEEK FOR 6 WEEKS **TRANSITION TO SHOE AFTER 2 WEEKS** NO DRY NEEDLING STM DESENTIZATION     Medical Diagnosis from Referral: M20.11,M21.611 (ICD-10-CM) - Hallux valgus with bunions of right foot M25.571 (ICD-10-CM) - Right ankle pain, unspecified chronicity   Evaluation Date: 12/30/2020  Authorization Period Expiration: 12/29/2021   Plan of Care Expiration: 02/09/2021  Visit # / Visits authorized: 1/ 1    Time In: 750  Time Out: 830  Total Billable Time: 40 minutes    Precautions: Standard and cancer (7 yrs ago)    Subjective   Date of onset: 11/18/20  History of current condition - Sandhya reports: Right Foot (Right bunionectomy and modified Cedeno distal soft tissue release 2) Right Eycuuys4yr tarsometatarsal joint arthrodesis 3) Right 4th toe condylectomy proximal and middle phalanges 4) Open reduction internal fixation Right 2nd metatarsalphalangeal joint ) DOS: 11/18/20    Chart review per MD note:   "I instructed her to wash the foot with a washcloth and soap as she has just been using her hand. Weight bearing as tolerated in darco shoe which was given today. PT 2/6 with scar tissue massage and desensitization.  She is very hesitant to weight bearing but I reassured her and I think PT will definitely help. I will keep observe the 2nd toe skin. I wrote her a prescription for compression stockings for post-operative swelling and told her that swelling can last 6 months to a year.  F/u 3 weeks with xray right foot."    She is still " using the knee scooter and feels that she cannot put weight on the foot in the boot.   Pt reports that in 2015, she saw podiatry for a bunion beginning. This continued to worsen. She was active on the treadmill. She did not have pain prior to sx. She is retired since 2015, but continues to write. She bikes and walks recreationally.     Medical History:   Past Medical History:   Diagnosis Date    Allergy     Disorder of kidney and ureter     Squamous cell carcinoma, arm     right inner arm     Thyroid disease        Surgical History:   Sandhya Madrigal  has a past surgical history that includes Colonoscopy (~2006); Dental surgery; Colonoscopy (N/A, 5/12/2017); Breast biopsy (Left, 2015); Correction of hammer toe (Right, 11/18/2020); Bunionectomy (Right, 11/18/2020); Lapidus bunionectomy (Right, 11/18/2020); Surgical removal of metatarsal head (Right, 11/18/2020); and Condylectomy (Right, 11/18/2020).    Medications:   Sandhya has a current medication list which includes the following prescription(s): albuterol, ascorbic acid, calcium carbonate/vitamin d3, calcium, carboxymethylcellulose, cetirizine, famotidine-calcium carbonate-magnesium hydroxide, ferrous sulfate, fluticasone propionate, ketotifen, levothyroxine, magnesium, multivit-min/iron/folic/lutein, ondansetron, oxycodone-acetaminophen, and vit c,t-fy-bnoow-lutein-zeaxan, and the following Facility-Administered Medications: lactated ringers and lidocaine (pf) 10 mg/ml (1%).    Allergies:   Review of patient's allergies indicates:   Allergen Reactions    Sulfa (sulfonamide antibiotics)      rash    Pcn [penicillins] Rash    Rifampin Rash        Imaging, xray:   Zafar Saldivar MD on 12/29/2020 13:32   X-Ray Foot Complete Right  Order: [id number]  Status:  Final result   Visible to patient:  No (not released) Next appt:  01/06/2021 at 07:45 AM in Outpatient Rehab (Nori Hogan, PT) Dx:  Hallux valgus with bunions of right f...  Details    Reading Physician  Reading Date Result Priority   Derrick Correa MD  [phone] 12/29/2020 Routine      Narrative & Impression     EXAMINATION:  XR FOOT COMPLETE 3 VIEW RIGHT     CLINICAL HISTORY:  . Hallux valgus (acquired), right foot     TECHNIQUE:  AP, lateral, and oblique views of the right foot were performed.     COMPARISON:  12/15/2020     FINDINGS:  Osseous structures demonstrate diffuse demineralization.  Postoperative changes of 1st TMT joint arthrodesis and bunionectomy of the 1st metatarsal head.  Orthopedic screw traverses the Lisfranc joint.  Postoperative changes of 2nd digit hammertoe correction with arthroplasty at the PIP joint.  Interval removal of previously demonstrated K-wire traversing the 2nd digit and metatarsal head.  No acute hardware complication.  No acute fracture or dislocation.  Interphalangeal joint space narrowing of the 1st through 5th digits.  Soft tissue swelling about the forefoot.  Talonavicular joint space narrowing.  Large plantar calcaneal spurring is present.     Impression:     As above.        Electronically signed by: Derrick Correa  Date:                                            12/29/2020  Time:                                           12:13             Last Resulted: 12/29/20 12:13 Order Details View Encounter Lab and Collection Details Routing Result History           Prior Therapy: none  Social History:  lives with their partner  Occupation: retired  Prior Level of Function: walking with some pn which she later said that she eventually began to notice  Current Level of Function: walking with knee scooter WBAT, but fearful, with darco boot, swelling, and eschar beginning to slough    Pain:  Current 2/10, worst 4/10, best 2/10   Location: right feet   Description: Aching, Dull, Sharp and Shooting  Aggravating Factors: Bending, Walking and WB  Easing Factors: ice and elevation    Pts goals: to improve walking and return to recreational activities.    Objective     Observation/gt:  walking with knee scooter and in NWB despite WBAT status, fearful, but improving from chart review. She reported bathing and using wash cloth to cleanse and debride loose and dead tissue. The necrosis spoken of could be the prior layer of skin deeply bruised or bleeding from the surgery. This is beginning to shed off the second toe. PT is able to visualize intact skin beneath this layer. Her dorsal 1st metatarsal wound and second metatarsal wound are healing well. The remaining eschar should slough off soon.    Range of Motion: AROM:    Ankle Left Right   Dorsiflexion +5 degrees -30 degrees   Plantarflexion 60 degrees 60 degrees   Inversion 30 degrees 12 degrees   Eversion 20 degrees 8 degrees       Strength:    Ankle Left Right   Dorsiflexion 5/5 3+/5   Plantarflexion 5/5 3+/5   Inversion 5/5 3+/5   Eversion 5/5 3+/5     Joint Mobility: na    Palpation: na    Sensation: na    Flexibility: na      CMS Impairment/Limitation/Restriction for FOTO na Survey    Therapist reviewed FOTO scores for Sandhya Madrigal on 12/30/2020.   FOTO documents entered into [employer] - see Media section.    Limitation Score: na%         TREATMENT   Treatment Time In: 817  Treatment Time Out: 830  Total Treatment time separate from Evaluation: 10 minutes    Sandhya received therapeutic exercises to develop ROM and flexibility for 10 minutes including:  Review of HEP handout and instructions.  APs, ankle IV/EV, CW/CCW, ice and elevate above heart    Home Exercises and Patient Education Provided    Education provided:   - Wound, wound safety, wound care, it has begun sloughing already.  - HEP  - WB status: being lat WS until next visit    Written Home Exercises Provided: yes.  Exercises were reviewed and Sandhya was able to demonstrate them prior to the end of the session.  Sandhya demonstrated good  understanding of the education provided.     See EMR under Patient Instructions for exercises provided 12/30/2020.    Assessment   Sandhya is a 75 y.o. female  referred to outpatient Physical Therapy with a medical diagnosis of M20.11,M21.611 (ICD-10-CM) - Hallux valgus with bunions of right foot M25.571 (ICD-10-CM) - Right ankle pain, unspecified chronicity. Pt presents with impaired gait and mobility due to impaired Foot and ankle AROM, edema, and impaired strength. Additional measures of toe ROM and strength to be assessed.    Pt prognosis is Good.   Pt will benefit from skilled outpatient Physical Therapy to address the deficits stated above and in the chart below, provide pt/family education, and to maximize pt's level of independence.     Plan of care discussed with patient: Yes  Pt's spiritual, cultural and educational needs considered and patient is agreeable to the plan of care and goals as stated below:     Anticipated Barriers for therapy: chronicity, fear    Medical Necessity is demonstrated by the following  History  Co-morbidities and personal factors that may impact the plan of care Co-morbidities:   see below    Past Medical History:   Diagnosis Date    Allergy     Disorder of kidney and ureter     Squamous cell carcinoma, arm     right inner arm     Thyroid disease      Personal Factors:   age     moderate   Examination  Body Structures and Functions, activity limitations and participation restrictions that may impact the plan of care Body Regions:   lower extremities    Body Systems:    ROM  strength  gait  transfers  motor control  motor learning    Participation Restrictions:   Impaired mobility and community amb    Activity limitations:   Learning and applying knowledge  no deficits    General Tasks and Commands  no deficits    Communication  no deficits    Mobility  walking  driving (bike, car, motorcycle)    Self care  washing oneself (bathing, drying, washing hands)  toileting  dressing    Domestic Life  shopping  cooking  doing house work (cleaning house, washing dishes, laundry)    Interactions/Relationships  no deficits    Life Areas  no  deficits    Community and Social Life  community life  recreation and leisure         high   Clinical Presentation evolving clinical presentation with changing clinical characteristics moderate   Decision Making/ Complexity Score: moderate     Goals:  Short Term Goals: 3 weeks   -Improve R ankle AROM DF to 15 deg for improved walking.  -Pt able to walk out of Darco shoe Community distances.  -R ankle AROM IV to 20 deg for improved symmetry.  -R ankle AROM EV to 15 deg for improved symmetry.  -R ankle strength grossly 4/5 or better.    Long Term Goals: 6 weeks   -Pt will ambulate without use of AD community distances.  -R ankle AROM within 5 deg of noninvolved ankle.  -R ankle strength grossly 4+/5 or better.    Plan   Plan of care Certification: 12/30/2020 to 02/09/2021.    Outpatient Physical Therapy 2 times weekly for 6 weeks to include the following interventions: Debridement (selective), Electrical Stimulation IFC, Gait Training, Manual Therapy, Moist Heat/ Ice, Neuromuscular Re-ed, Patient Education, Self Care, Therapeutic Activites, Therapeutic Exercise and Ultrasound.     Nori Hogan, PT

## 2021-01-06 ENCOUNTER — CLINICAL SUPPORT (OUTPATIENT)
Dept: REHABILITATION | Facility: HOSPITAL | Age: 76
End: 2021-01-06
Attending: ORTHOPAEDIC SURGERY
Payer: MEDICARE

## 2021-01-06 DIAGNOSIS — R26.9 GAIT ABNORMALITY: Primary | ICD-10-CM

## 2021-01-06 DIAGNOSIS — R26.9 ABNORMALITY OF GAIT AND MOBILITY: ICD-10-CM

## 2021-01-06 PROCEDURE — 97116 GAIT TRAINING THERAPY: CPT | Mod: PO | Performed by: PHYSICAL THERAPIST

## 2021-01-06 PROCEDURE — 97140 MANUAL THERAPY 1/> REGIONS: CPT | Mod: PO | Performed by: PHYSICAL THERAPIST

## 2021-01-08 ENCOUNTER — CLINICAL SUPPORT (OUTPATIENT)
Dept: REHABILITATION | Facility: HOSPITAL | Age: 76
End: 2021-01-08
Attending: ORTHOPAEDIC SURGERY
Payer: MEDICARE

## 2021-01-08 ENCOUNTER — IMMUNIZATION (OUTPATIENT)
Dept: FAMILY MEDICINE | Facility: CLINIC | Age: 76
End: 2021-01-08
Payer: MEDICARE

## 2021-01-08 DIAGNOSIS — Z23 NEED FOR VACCINATION: ICD-10-CM

## 2021-01-08 DIAGNOSIS — R26.9 ABNORMALITY OF GAIT AND MOBILITY: ICD-10-CM

## 2021-01-08 DIAGNOSIS — R26.9 GAIT ABNORMALITY: ICD-10-CM

## 2021-01-08 PROCEDURE — 97116 GAIT TRAINING THERAPY: CPT | Mod: PO,CQ

## 2021-01-08 PROCEDURE — 91300 COVID-19, MRNA, LNP-S, PF, 30 MCG/0.3 ML DOSE VACCINE: CPT | Mod: PBBFAC,PO

## 2021-01-08 PROCEDURE — 97110 THERAPEUTIC EXERCISES: CPT | Mod: PO,CQ

## 2021-01-08 PROCEDURE — 97140 MANUAL THERAPY 1/> REGIONS: CPT | Mod: PO,CQ

## 2021-01-12 ENCOUNTER — CLINICAL SUPPORT (OUTPATIENT)
Dept: REHABILITATION | Facility: HOSPITAL | Age: 76
End: 2021-01-12
Attending: ORTHOPAEDIC SURGERY
Payer: MEDICARE

## 2021-01-12 DIAGNOSIS — R26.9 GAIT ABNORMALITY: Primary | ICD-10-CM

## 2021-01-12 DIAGNOSIS — R26.9 ABNORMALITY OF GAIT AND MOBILITY: ICD-10-CM

## 2021-01-12 PROCEDURE — 97116 GAIT TRAINING THERAPY: CPT | Mod: PO | Performed by: PHYSICAL THERAPIST

## 2021-01-12 PROCEDURE — 97140 MANUAL THERAPY 1/> REGIONS: CPT | Mod: PO | Performed by: PHYSICAL THERAPIST

## 2021-01-12 PROCEDURE — 97110 THERAPEUTIC EXERCISES: CPT | Mod: PO | Performed by: PHYSICAL THERAPIST

## 2021-01-14 DIAGNOSIS — M20.11 HALLUX VALGUS WITH BUNIONS OF RIGHT FOOT: Primary | ICD-10-CM

## 2021-01-14 DIAGNOSIS — M21.611 HALLUX VALGUS WITH BUNIONS OF RIGHT FOOT: Primary | ICD-10-CM

## 2021-01-15 ENCOUNTER — CLINICAL SUPPORT (OUTPATIENT)
Dept: REHABILITATION | Facility: HOSPITAL | Age: 76
End: 2021-01-15
Attending: ORTHOPAEDIC SURGERY
Payer: MEDICARE

## 2021-01-15 DIAGNOSIS — R26.9 ABNORMALITY OF GAIT AND MOBILITY: ICD-10-CM

## 2021-01-15 DIAGNOSIS — R26.9 GAIT ABNORMALITY: ICD-10-CM

## 2021-01-15 PROCEDURE — 97110 THERAPEUTIC EXERCISES: CPT | Mod: PO,CQ

## 2021-01-15 PROCEDURE — 97116 GAIT TRAINING THERAPY: CPT | Mod: PO,CQ

## 2021-01-15 PROCEDURE — 97140 MANUAL THERAPY 1/> REGIONS: CPT | Mod: PO,CQ

## 2021-01-19 ENCOUNTER — HOSPITAL ENCOUNTER (OUTPATIENT)
Dept: RADIOLOGY | Facility: HOSPITAL | Age: 76
Discharge: HOME | End: 2021-01-19
Attending: ORTHOPAEDIC SURGERY
Payer: MEDICARE

## 2021-01-19 ENCOUNTER — CLINICAL SUPPORT (OUTPATIENT)
Dept: REHABILITATION | Facility: HOSPITAL | Age: 76
End: 2021-01-19
Attending: ORTHOPAEDIC SURGERY
Payer: MEDICARE

## 2021-01-19 ENCOUNTER — OFFICE VISIT (OUTPATIENT)
Dept: ORTHOPEDICS | Facility: CLINIC | Age: 76
End: 2021-01-19
Payer: MEDICARE

## 2021-01-19 VITALS
DIASTOLIC BLOOD PRESSURE: 88 MMHG | BODY MASS INDEX: 24.17 KG/M2 | HEIGHT: 65 IN | HEART RATE: 79 BPM | SYSTOLIC BLOOD PRESSURE: 146 MMHG | WEIGHT: 145.06 LBS

## 2021-01-19 DIAGNOSIS — M20.11 HALLUX VALGUS WITH BUNIONS OF RIGHT FOOT: Primary | ICD-10-CM

## 2021-01-19 DIAGNOSIS — R26.9 GAIT ABNORMALITY: ICD-10-CM

## 2021-01-19 DIAGNOSIS — R26.9 ABNORMALITY OF GAIT AND MOBILITY: ICD-10-CM

## 2021-01-19 DIAGNOSIS — M21.611 HALLUX VALGUS WITH BUNIONS OF RIGHT FOOT: Primary | ICD-10-CM

## 2021-01-19 DIAGNOSIS — M21.611 HALLUX VALGUS WITH BUNIONS OF RIGHT FOOT: ICD-10-CM

## 2021-01-19 DIAGNOSIS — M20.41 HAMMERTOE OF SECOND TOE OF RIGHT FOOT: ICD-10-CM

## 2021-01-19 DIAGNOSIS — M20.11 HALLUX VALGUS WITH BUNIONS OF RIGHT FOOT: ICD-10-CM

## 2021-01-19 PROCEDURE — 97140 MANUAL THERAPY 1/> REGIONS: CPT | Mod: PO,CQ

## 2021-01-19 PROCEDURE — 73630 X-RAY EXAM OF FOOT: CPT | Mod: 26,RT,, | Performed by: RADIOLOGY

## 2021-01-19 PROCEDURE — 73630 XR FOOT COMPLETE 3 VIEW RIGHT: ICD-10-PCS | Mod: 26,RT,, | Performed by: RADIOLOGY

## 2021-01-19 PROCEDURE — 99024 POSTOP FOLLOW-UP VISIT: CPT | Mod: POP,,, | Performed by: ORTHOPAEDIC SURGERY

## 2021-01-19 PROCEDURE — 97116 GAIT TRAINING THERAPY: CPT | Mod: PO,CQ

## 2021-01-19 PROCEDURE — 99024 PR POST-OP FOLLOW-UP VISIT: ICD-10-PCS | Mod: POP,,, | Performed by: ORTHOPAEDIC SURGERY

## 2021-01-19 PROCEDURE — 97110 THERAPEUTIC EXERCISES: CPT | Mod: PO,CQ

## 2021-01-19 PROCEDURE — 73630 X-RAY EXAM OF FOOT: CPT | Mod: TC,PO,RT

## 2021-01-19 PROCEDURE — 99214 OFFICE O/P EST MOD 30 MIN: CPT | Mod: PBBFAC,25,PN | Performed by: ORTHOPAEDIC SURGERY

## 2021-01-19 PROCEDURE — 99999 PR PBB SHADOW E&M-EST. PATIENT-LVL IV: CPT | Mod: PBBFAC,,, | Performed by: ORTHOPAEDIC SURGERY

## 2021-01-19 PROCEDURE — 99999 PR PBB SHADOW E&M-EST. PATIENT-LVL IV: ICD-10-PCS | Mod: PBBFAC,,, | Performed by: ORTHOPAEDIC SURGERY

## 2021-01-22 ENCOUNTER — CLINICAL SUPPORT (OUTPATIENT)
Dept: REHABILITATION | Facility: HOSPITAL | Age: 76
End: 2021-01-22
Attending: ORTHOPAEDIC SURGERY
Payer: MEDICARE

## 2021-01-22 DIAGNOSIS — R26.9 GAIT ABNORMALITY: Primary | ICD-10-CM

## 2021-01-22 DIAGNOSIS — R26.9 ABNORMALITY OF GAIT AND MOBILITY: ICD-10-CM

## 2021-01-22 PROCEDURE — 97112 NEUROMUSCULAR REEDUCATION: CPT | Mod: PO | Performed by: PHYSICAL THERAPIST

## 2021-01-22 PROCEDURE — 97116 GAIT TRAINING THERAPY: CPT | Mod: PO | Performed by: PHYSICAL THERAPIST

## 2021-01-22 PROCEDURE — 97140 MANUAL THERAPY 1/> REGIONS: CPT | Mod: PO | Performed by: PHYSICAL THERAPIST

## 2021-01-26 ENCOUNTER — CLINICAL SUPPORT (OUTPATIENT)
Dept: REHABILITATION | Facility: HOSPITAL | Age: 76
End: 2021-01-26
Attending: ORTHOPAEDIC SURGERY
Payer: MEDICARE

## 2021-01-26 DIAGNOSIS — R26.9 GAIT ABNORMALITY: Primary | ICD-10-CM

## 2021-01-26 DIAGNOSIS — R26.9 ABNORMALITY OF GAIT AND MOBILITY: ICD-10-CM

## 2021-01-26 PROCEDURE — 97112 NEUROMUSCULAR REEDUCATION: CPT | Mod: PO | Performed by: PHYSICAL THERAPIST

## 2021-01-26 PROCEDURE — 97110 THERAPEUTIC EXERCISES: CPT | Mod: PO | Performed by: PHYSICAL THERAPIST

## 2021-01-27 ENCOUNTER — TELEPHONE (OUTPATIENT)
Dept: ORTHOPEDICS | Facility: CLINIC | Age: 76
End: 2021-01-27

## 2021-01-27 DIAGNOSIS — M20.41 HAMMERTOE OF SECOND TOE OF RIGHT FOOT: ICD-10-CM

## 2021-01-27 DIAGNOSIS — M20.11 HALLUX VALGUS WITH BUNIONS OF RIGHT FOOT: Primary | ICD-10-CM

## 2021-01-27 DIAGNOSIS — M21.611 HALLUX VALGUS WITH BUNIONS OF RIGHT FOOT: Primary | ICD-10-CM

## 2021-01-27 DIAGNOSIS — T81.89XA DELAYED SURGICAL WOUND HEALING, INITIAL ENCOUNTER: ICD-10-CM

## 2021-01-27 RX ORDER — GAUZE BANDAGE 1/4"X180"
BANDAGE TOPICAL
Qty: 12 EACH | Refills: 1 | Status: SHIPPED | OUTPATIENT
Start: 2021-01-27

## 2021-01-27 RX ORDER — FOAM BANDAGE 3" X 3"
BANDAGE TOPICAL
Qty: 10 EACH | Refills: 0 | Status: SHIPPED | OUTPATIENT
Start: 2021-01-27

## 2021-01-29 ENCOUNTER — CLINICAL SUPPORT (OUTPATIENT)
Dept: REHABILITATION | Facility: HOSPITAL | Age: 76
End: 2021-01-29
Attending: ORTHOPAEDIC SURGERY
Payer: MEDICARE

## 2021-01-29 ENCOUNTER — PATIENT MESSAGE (OUTPATIENT)
Dept: REHABILITATION | Facility: HOSPITAL | Age: 76
End: 2021-01-29

## 2021-01-29 ENCOUNTER — IMMUNIZATION (OUTPATIENT)
Dept: FAMILY MEDICINE | Facility: CLINIC | Age: 76
End: 2021-01-29
Payer: MEDICARE

## 2021-01-29 DIAGNOSIS — Z23 NEED FOR VACCINATION: Primary | ICD-10-CM

## 2021-01-29 DIAGNOSIS — R26.9 GAIT ABNORMALITY: Primary | ICD-10-CM

## 2021-01-29 DIAGNOSIS — R26.9 ABNORMALITY OF GAIT AND MOBILITY: ICD-10-CM

## 2021-01-29 PROCEDURE — 97597 DBRDMT OPN WND 1ST 20 CM/<: CPT | Mod: PO | Performed by: PHYSICAL THERAPIST

## 2021-01-29 PROCEDURE — 91300 COVID-19, MRNA, LNP-S, PF, 30 MCG/0.3 ML DOSE VACCINE: CPT | Mod: PBBFAC | Performed by: FAMILY MEDICINE

## 2021-01-29 PROCEDURE — 0002A COVID-19, MRNA, LNP-S, PF, 30 MCG/0.3 ML DOSE VACCINE: CPT | Mod: PBBFAC | Performed by: FAMILY MEDICINE

## 2021-01-29 PROCEDURE — 97116 GAIT TRAINING THERAPY: CPT | Mod: PO | Performed by: PHYSICAL THERAPIST

## 2021-01-29 PROCEDURE — 97110 THERAPEUTIC EXERCISES: CPT | Mod: PO | Performed by: PHYSICAL THERAPIST

## 2021-01-31 ENCOUNTER — PATIENT MESSAGE (OUTPATIENT)
Dept: REHABILITATION | Facility: HOSPITAL | Age: 76
End: 2021-01-31

## 2021-02-01 ENCOUNTER — CLINICAL SUPPORT (OUTPATIENT)
Dept: REHABILITATION | Facility: HOSPITAL | Age: 76
End: 2021-02-01
Attending: ORTHOPAEDIC SURGERY
Payer: MEDICARE

## 2021-02-01 DIAGNOSIS — R26.9 GAIT ABNORMALITY: Primary | ICD-10-CM

## 2021-02-01 DIAGNOSIS — R26.9 ABNORMALITY OF GAIT AND MOBILITY: ICD-10-CM

## 2021-02-01 PROCEDURE — 97597 DBRDMT OPN WND 1ST 20 CM/<: CPT | Mod: PO | Performed by: PHYSICAL THERAPIST

## 2021-02-01 PROCEDURE — 97110 THERAPEUTIC EXERCISES: CPT | Mod: PO | Performed by: PHYSICAL THERAPIST

## 2021-02-02 ENCOUNTER — PATIENT MESSAGE (OUTPATIENT)
Dept: REHABILITATION | Facility: HOSPITAL | Age: 76
End: 2021-02-02

## 2021-02-05 ENCOUNTER — CLINICAL SUPPORT (OUTPATIENT)
Dept: REHABILITATION | Facility: HOSPITAL | Age: 76
End: 2021-02-05
Attending: ORTHOPAEDIC SURGERY
Payer: MEDICARE

## 2021-02-05 DIAGNOSIS — R26.9 GAIT ABNORMALITY: ICD-10-CM

## 2021-02-05 DIAGNOSIS — R26.9 ABNORMALITY OF GAIT AND MOBILITY: ICD-10-CM

## 2021-02-05 PROCEDURE — 97597 DBRDMT OPN WND 1ST 20 CM/<: CPT | Mod: PO,CQ

## 2021-02-08 ENCOUNTER — PATIENT MESSAGE (OUTPATIENT)
Dept: REHABILITATION | Facility: HOSPITAL | Age: 76
End: 2021-02-08

## 2021-02-09 ENCOUNTER — CLINICAL SUPPORT (OUTPATIENT)
Dept: REHABILITATION | Facility: HOSPITAL | Age: 76
End: 2021-02-09
Attending: ORTHOPAEDIC SURGERY
Payer: MEDICARE

## 2021-02-09 DIAGNOSIS — R26.9 GAIT ABNORMALITY: Primary | ICD-10-CM

## 2021-02-09 DIAGNOSIS — R26.9 ABNORMALITY OF GAIT AND MOBILITY: ICD-10-CM

## 2021-02-09 PROCEDURE — 97110 THERAPEUTIC EXERCISES: CPT | Mod: PO | Performed by: PHYSICAL THERAPIST

## 2021-02-09 PROCEDURE — 97597 DBRDMT OPN WND 1ST 20 CM/<: CPT | Mod: PO | Performed by: PHYSICAL THERAPIST

## 2021-02-12 ENCOUNTER — CLINICAL SUPPORT (OUTPATIENT)
Dept: REHABILITATION | Facility: HOSPITAL | Age: 76
End: 2021-02-12
Attending: ORTHOPAEDIC SURGERY
Payer: MEDICARE

## 2021-02-12 DIAGNOSIS — R26.9 GAIT ABNORMALITY: Primary | ICD-10-CM

## 2021-02-12 DIAGNOSIS — R26.9 ABNORMALITY OF GAIT AND MOBILITY: ICD-10-CM

## 2021-02-12 PROCEDURE — 97110 THERAPEUTIC EXERCISES: CPT | Mod: PO | Performed by: PHYSICAL THERAPIST

## 2021-02-12 PROCEDURE — 97597 DBRDMT OPN WND 1ST 20 CM/<: CPT | Mod: PO | Performed by: PHYSICAL THERAPIST

## 2021-02-15 ENCOUNTER — DOCUMENTATION ONLY (OUTPATIENT)
Dept: REHABILITATION | Facility: HOSPITAL | Age: 76
End: 2021-02-15

## 2021-02-17 ENCOUNTER — CLINICAL SUPPORT (OUTPATIENT)
Dept: REHABILITATION | Facility: HOSPITAL | Age: 76
End: 2021-02-17
Attending: ORTHOPAEDIC SURGERY
Payer: MEDICARE

## 2021-02-17 ENCOUNTER — DOCUMENTATION ONLY (OUTPATIENT)
Dept: REHABILITATION | Facility: HOSPITAL | Age: 76
End: 2021-02-17

## 2021-02-17 DIAGNOSIS — R26.9 ABNORMALITY OF GAIT AND MOBILITY: ICD-10-CM

## 2021-02-17 DIAGNOSIS — R26.9 GAIT ABNORMALITY: Primary | ICD-10-CM

## 2021-02-17 PROCEDURE — 97110 THERAPEUTIC EXERCISES: CPT | Mod: PO | Performed by: PHYSICAL THERAPIST

## 2021-02-17 PROCEDURE — 97597 DBRDMT OPN WND 1ST 20 CM/<: CPT | Mod: PO | Performed by: PHYSICAL THERAPIST

## 2021-02-19 ENCOUNTER — PATIENT MESSAGE (OUTPATIENT)
Dept: ORTHOPEDICS | Facility: CLINIC | Age: 76
End: 2021-02-19

## 2021-02-19 DIAGNOSIS — M20.11 HALLUX VALGUS WITH BUNIONS OF RIGHT FOOT: Primary | ICD-10-CM

## 2021-02-19 DIAGNOSIS — M21.611 HALLUX VALGUS WITH BUNIONS OF RIGHT FOOT: Primary | ICD-10-CM

## 2021-02-19 DIAGNOSIS — M20.41 HAMMERTOE OF SECOND TOE OF RIGHT FOOT: ICD-10-CM

## 2021-02-23 ENCOUNTER — HOSPITAL ENCOUNTER (OUTPATIENT)
Dept: RADIOLOGY | Facility: HOSPITAL | Age: 76
Discharge: HOME | End: 2021-02-23
Attending: ORTHOPAEDIC SURGERY
Payer: MEDICARE

## 2021-02-23 ENCOUNTER — CLINICAL SUPPORT (OUTPATIENT)
Dept: REHABILITATION | Facility: HOSPITAL | Age: 76
End: 2021-02-23
Attending: ORTHOPAEDIC SURGERY
Payer: MEDICARE

## 2021-02-23 ENCOUNTER — OFFICE VISIT (OUTPATIENT)
Dept: ORTHOPEDICS | Facility: CLINIC | Age: 76
End: 2021-02-23
Payer: MEDICARE

## 2021-02-23 ENCOUNTER — TELEPHONE (OUTPATIENT)
Dept: ORTHOPEDICS | Facility: CLINIC | Age: 76
End: 2021-02-23

## 2021-02-23 VITALS
SYSTOLIC BLOOD PRESSURE: 158 MMHG | WEIGHT: 145.06 LBS | HEIGHT: 65 IN | BODY MASS INDEX: 24.17 KG/M2 | DIASTOLIC BLOOD PRESSURE: 88 MMHG | HEART RATE: 66 BPM

## 2021-02-23 DIAGNOSIS — M21.611 HALLUX VALGUS WITH BUNIONS OF RIGHT FOOT: ICD-10-CM

## 2021-02-23 DIAGNOSIS — M20.41 HAMMERTOE OF SECOND TOE OF RIGHT FOOT: ICD-10-CM

## 2021-02-23 DIAGNOSIS — R26.9 GAIT ABNORMALITY: Primary | ICD-10-CM

## 2021-02-23 DIAGNOSIS — M20.11 HALLUX VALGUS WITH BUNIONS OF RIGHT FOOT: ICD-10-CM

## 2021-02-23 DIAGNOSIS — M21.611 HALLUX VALGUS WITH BUNIONS OF RIGHT FOOT: Primary | ICD-10-CM

## 2021-02-23 DIAGNOSIS — M20.11 HALLUX VALGUS WITH BUNIONS OF RIGHT FOOT: Primary | ICD-10-CM

## 2021-02-23 DIAGNOSIS — R26.9 ABNORMALITY OF GAIT AND MOBILITY: ICD-10-CM

## 2021-02-23 PROCEDURE — 99214 OFFICE O/P EST MOD 30 MIN: CPT | Mod: PBBFAC,25,PN | Performed by: ORTHOPAEDIC SURGERY

## 2021-02-23 PROCEDURE — 73630 X-RAY EXAM OF FOOT: CPT | Mod: TC,PO,RT

## 2021-02-23 PROCEDURE — 73630 XR FOOT COMPLETE 3 VIEW RIGHT: ICD-10-PCS | Mod: 26,RT,, | Performed by: RADIOLOGY

## 2021-02-23 PROCEDURE — 73630 X-RAY EXAM OF FOOT: CPT | Mod: 26,RT,, | Performed by: RADIOLOGY

## 2021-02-23 PROCEDURE — 99214 OFFICE O/P EST MOD 30 MIN: CPT | Mod: S$PBB,,, | Performed by: ORTHOPAEDIC SURGERY

## 2021-02-23 PROCEDURE — 99999 PR PBB SHADOW E&M-EST. PATIENT-LVL IV: CPT | Mod: PBBFAC,,, | Performed by: ORTHOPAEDIC SURGERY

## 2021-02-23 PROCEDURE — 99214 PR OFFICE/OUTPT VISIT, EST, LEVL IV, 30-39 MIN: ICD-10-PCS | Mod: S$PBB,,, | Performed by: ORTHOPAEDIC SURGERY

## 2021-02-23 PROCEDURE — 97110 THERAPEUTIC EXERCISES: CPT | Mod: PO | Performed by: PHYSICAL THERAPIST

## 2021-02-23 PROCEDURE — 99999 PR PBB SHADOW E&M-EST. PATIENT-LVL IV: ICD-10-PCS | Mod: PBBFAC,,, | Performed by: ORTHOPAEDIC SURGERY

## 2021-02-26 ENCOUNTER — OFFICE VISIT (OUTPATIENT)
Dept: FAMILY MEDICINE | Facility: CLINIC | Age: 76
End: 2021-02-26
Payer: MEDICARE

## 2021-02-26 ENCOUNTER — CLINICAL SUPPORT (OUTPATIENT)
Dept: REHABILITATION | Facility: HOSPITAL | Age: 76
End: 2021-02-26
Attending: ORTHOPAEDIC SURGERY
Payer: MEDICARE

## 2021-02-26 VITALS
OXYGEN SATURATION: 97 % | SYSTOLIC BLOOD PRESSURE: 122 MMHG | HEIGHT: 65 IN | BODY MASS INDEX: 22.85 KG/M2 | DIASTOLIC BLOOD PRESSURE: 80 MMHG | HEART RATE: 65 BPM | WEIGHT: 137.13 LBS

## 2021-02-26 DIAGNOSIS — R26.9 GAIT ABNORMALITY: Primary | ICD-10-CM

## 2021-02-26 DIAGNOSIS — N18.30 STAGE 3 CHRONIC KIDNEY DISEASE, UNSPECIFIED WHETHER STAGE 3A OR 3B CKD: ICD-10-CM

## 2021-02-26 DIAGNOSIS — J30.9 ALLERGIC RHINITIS: ICD-10-CM

## 2021-02-26 DIAGNOSIS — R26.9 ABNORMALITY OF GAIT AND MOBILITY: ICD-10-CM

## 2021-02-26 DIAGNOSIS — Z00.00 ENCOUNTER FOR PREVENTIVE HEALTH EXAMINATION: Primary | ICD-10-CM

## 2021-02-26 DIAGNOSIS — E03.9 HYPOTHYROIDISM (ACQUIRED): ICD-10-CM

## 2021-02-26 PROCEDURE — 99215 OFFICE O/P EST HI 40 MIN: CPT | Mod: PBBFAC,PO | Performed by: NURSE PRACTITIONER

## 2021-02-26 PROCEDURE — G0439 PPPS, SUBSEQ VISIT: HCPCS | Mod: ,,, | Performed by: NURSE PRACTITIONER

## 2021-02-26 PROCEDURE — 97140 MANUAL THERAPY 1/> REGIONS: CPT | Mod: PO | Performed by: PHYSICAL THERAPIST

## 2021-02-26 PROCEDURE — 97112 NEUROMUSCULAR REEDUCATION: CPT | Mod: PO | Performed by: PHYSICAL THERAPIST

## 2021-02-26 PROCEDURE — G0439 PR MEDICARE ANNUAL WELLNESS SUBSEQUENT VISIT: ICD-10-PCS | Mod: ,,, | Performed by: NURSE PRACTITIONER

## 2021-02-26 PROCEDURE — 97110 THERAPEUTIC EXERCISES: CPT | Mod: PO | Performed by: PHYSICAL THERAPIST

## 2021-02-26 PROCEDURE — 99999 PR PBB SHADOW E&M-EST. PATIENT-LVL V: CPT | Mod: PBBFAC,,, | Performed by: NURSE PRACTITIONER

## 2021-02-26 PROCEDURE — 99999 PR PBB SHADOW E&M-EST. PATIENT-LVL V: ICD-10-PCS | Mod: PBBFAC,,, | Performed by: NURSE PRACTITIONER

## 2021-02-26 RX ORDER — FLUTICASONE PROPIONATE 50 MCG
2 SPRAY, SUSPENSION (ML) NASAL DAILY
Qty: 48 G | Refills: 2 | Status: SHIPPED | OUTPATIENT
Start: 2021-02-26

## 2021-03-02 ENCOUNTER — CLINICAL SUPPORT (OUTPATIENT)
Dept: REHABILITATION | Facility: HOSPITAL | Age: 76
End: 2021-03-02
Attending: ORTHOPAEDIC SURGERY
Payer: MEDICARE

## 2021-03-02 DIAGNOSIS — R26.9 GAIT ABNORMALITY: ICD-10-CM

## 2021-03-02 DIAGNOSIS — R26.9 ABNORMALITY OF GAIT AND MOBILITY: ICD-10-CM

## 2021-03-02 PROCEDURE — 97112 NEUROMUSCULAR REEDUCATION: CPT | Mod: PO,CQ

## 2021-03-02 PROCEDURE — 97140 MANUAL THERAPY 1/> REGIONS: CPT | Mod: PO,CQ

## 2021-03-02 PROCEDURE — 97110 THERAPEUTIC EXERCISES: CPT | Mod: PO,CQ

## 2021-03-05 ENCOUNTER — CLINICAL SUPPORT (OUTPATIENT)
Dept: REHABILITATION | Facility: HOSPITAL | Age: 76
End: 2021-03-05
Attending: ORTHOPAEDIC SURGERY
Payer: MEDICARE

## 2021-03-05 DIAGNOSIS — R26.9 ABNORMALITY OF GAIT AND MOBILITY: ICD-10-CM

## 2021-03-05 DIAGNOSIS — R26.9 GAIT ABNORMALITY: Primary | ICD-10-CM

## 2021-03-05 PROCEDURE — 97112 NEUROMUSCULAR REEDUCATION: CPT | Mod: PO | Performed by: PHYSICAL THERAPIST

## 2021-03-05 PROCEDURE — 97140 MANUAL THERAPY 1/> REGIONS: CPT | Mod: PO | Performed by: PHYSICAL THERAPIST

## 2021-03-05 PROCEDURE — 97110 THERAPEUTIC EXERCISES: CPT | Mod: PO | Performed by: PHYSICAL THERAPIST

## 2021-03-09 ENCOUNTER — CLINICAL SUPPORT (OUTPATIENT)
Dept: REHABILITATION | Facility: HOSPITAL | Age: 76
End: 2021-03-09
Attending: ORTHOPAEDIC SURGERY
Payer: MEDICARE

## 2021-03-09 DIAGNOSIS — R26.9 ABNORMALITY OF GAIT AND MOBILITY: ICD-10-CM

## 2021-03-09 DIAGNOSIS — R26.9 GAIT ABNORMALITY: Primary | ICD-10-CM

## 2021-03-09 PROCEDURE — 97110 THERAPEUTIC EXERCISES: CPT | Mod: PO | Performed by: PHYSICAL THERAPIST

## 2021-03-09 PROCEDURE — 97112 NEUROMUSCULAR REEDUCATION: CPT | Mod: PO | Performed by: PHYSICAL THERAPIST

## 2021-03-09 PROCEDURE — 97140 MANUAL THERAPY 1/> REGIONS: CPT | Mod: PO | Performed by: PHYSICAL THERAPIST

## 2021-03-11 ENCOUNTER — CLINICAL SUPPORT (OUTPATIENT)
Dept: REHABILITATION | Facility: HOSPITAL | Age: 76
End: 2021-03-11
Attending: ORTHOPAEDIC SURGERY
Payer: MEDICARE

## 2021-03-11 DIAGNOSIS — R26.9 GAIT ABNORMALITY: Primary | ICD-10-CM

## 2021-03-11 DIAGNOSIS — R26.9 ABNORMALITY OF GAIT AND MOBILITY: ICD-10-CM

## 2021-03-11 PROCEDURE — 97112 NEUROMUSCULAR REEDUCATION: CPT | Mod: PO | Performed by: PHYSICAL THERAPIST

## 2021-03-11 PROCEDURE — 97140 MANUAL THERAPY 1/> REGIONS: CPT | Mod: PO | Performed by: PHYSICAL THERAPIST

## 2021-03-11 PROCEDURE — 97110 THERAPEUTIC EXERCISES: CPT | Mod: PO | Performed by: PHYSICAL THERAPIST

## 2021-03-16 ENCOUNTER — CLINICAL SUPPORT (OUTPATIENT)
Dept: REHABILITATION | Facility: HOSPITAL | Age: 76
End: 2021-03-16
Attending: ORTHOPAEDIC SURGERY
Payer: MEDICARE

## 2021-03-16 DIAGNOSIS — R26.9 GAIT ABNORMALITY: Primary | ICD-10-CM

## 2021-03-16 DIAGNOSIS — R26.9 ABNORMALITY OF GAIT AND MOBILITY: ICD-10-CM

## 2021-03-16 PROCEDURE — 97110 THERAPEUTIC EXERCISES: CPT | Mod: PO | Performed by: PHYSICAL THERAPIST

## 2021-03-23 ENCOUNTER — CLINICAL SUPPORT (OUTPATIENT)
Dept: REHABILITATION | Facility: HOSPITAL | Age: 76
End: 2021-03-23
Attending: ORTHOPAEDIC SURGERY
Payer: MEDICARE

## 2021-03-23 DIAGNOSIS — R26.9 GAIT ABNORMALITY: Primary | ICD-10-CM

## 2021-03-23 DIAGNOSIS — R26.9 ABNORMALITY OF GAIT AND MOBILITY: ICD-10-CM

## 2021-03-23 PROCEDURE — 97112 NEUROMUSCULAR REEDUCATION: CPT | Mod: PO | Performed by: PHYSICAL THERAPIST

## 2021-03-23 PROCEDURE — 97110 THERAPEUTIC EXERCISES: CPT | Mod: PO | Performed by: PHYSICAL THERAPIST

## 2021-03-29 ENCOUNTER — CLINICAL SUPPORT (OUTPATIENT)
Dept: REHABILITATION | Facility: HOSPITAL | Age: 76
End: 2021-03-29
Attending: ORTHOPAEDIC SURGERY
Payer: MEDICARE

## 2021-03-29 DIAGNOSIS — R26.9 ABNORMALITY OF GAIT AND MOBILITY: ICD-10-CM

## 2021-03-29 DIAGNOSIS — R26.9 GAIT ABNORMALITY: Primary | ICD-10-CM

## 2021-03-29 PROCEDURE — 97110 THERAPEUTIC EXERCISES: CPT | Mod: PO | Performed by: PHYSICAL THERAPIST

## 2021-05-11 ENCOUNTER — OFFICE VISIT (OUTPATIENT)
Dept: DERMATOLOGY | Facility: CLINIC | Age: 76
End: 2021-05-11
Payer: MEDICARE

## 2021-05-11 VITALS — HEIGHT: 65 IN | WEIGHT: 137.13 LBS | BODY MASS INDEX: 22.85 KG/M2

## 2021-05-11 DIAGNOSIS — L57.0 ACTINIC KERATOSIS: ICD-10-CM

## 2021-05-11 DIAGNOSIS — D22.9 BENIGN NEVUS: Primary | ICD-10-CM

## 2021-05-11 DIAGNOSIS — L82.1 SEBORRHEIC KERATOSIS: ICD-10-CM

## 2021-05-11 PROCEDURE — 99213 PR OFFICE/OUTPT VISIT, EST, LEVL III, 20-29 MIN: ICD-10-PCS | Mod: 25,S$PBB,, | Performed by: DERMATOLOGY

## 2021-05-11 PROCEDURE — 99213 OFFICE O/P EST LOW 20 MIN: CPT | Mod: PBBFAC,PO,25 | Performed by: DERMATOLOGY

## 2021-05-11 PROCEDURE — 99213 OFFICE O/P EST LOW 20 MIN: CPT | Mod: 25,S$PBB,, | Performed by: DERMATOLOGY

## 2021-05-11 PROCEDURE — 17000 DESTRUCT PREMALG LESION: CPT | Mod: S$PBB,,, | Performed by: DERMATOLOGY

## 2021-05-11 PROCEDURE — 99999 PR PBB SHADOW E&M-EST. PATIENT-LVL III: CPT | Mod: PBBFAC,,, | Performed by: DERMATOLOGY

## 2021-05-11 PROCEDURE — 17000 DESTRUCT PREMALG LESION: CPT | Mod: PBBFAC,PO | Performed by: DERMATOLOGY

## 2021-05-11 PROCEDURE — 17000 PR DESTRUCTION(LASER SURGERY,CRYOSURGERY,CHEMOSURGERY),PREMALIGNANT LESIONS,FIRST LESION: ICD-10-PCS | Mod: S$PBB,,, | Performed by: DERMATOLOGY

## 2021-05-11 PROCEDURE — 99999 PR PBB SHADOW E&M-EST. PATIENT-LVL III: ICD-10-PCS | Mod: PBBFAC,,, | Performed by: DERMATOLOGY

## 2021-08-04 ENCOUNTER — PATIENT MESSAGE (OUTPATIENT)
Dept: FAMILY MEDICINE | Facility: CLINIC | Age: 76
End: 2021-08-04

## 2021-08-04 DIAGNOSIS — E03.9 HYPOTHYROIDISM (ACQUIRED): ICD-10-CM

## 2021-08-04 RX ORDER — LEVOTHYROXINE SODIUM 75 UG/1
75 TABLET ORAL
Qty: 90 TABLET | Refills: 0 | Status: SHIPPED | OUTPATIENT
Start: 2021-08-04 | End: 2021-09-24 | Stop reason: SDUPTHER

## 2021-09-24 DIAGNOSIS — E03.9 HYPOTHYROIDISM (ACQUIRED): ICD-10-CM

## 2021-09-26 RX ORDER — LEVOTHYROXINE SODIUM 75 UG/1
75 TABLET ORAL
Qty: 90 TABLET | Refills: 0 | Status: SHIPPED | OUTPATIENT
Start: 2021-09-26 | End: 2021-10-27 | Stop reason: SDUPTHER

## 2021-10-27 DIAGNOSIS — Z00.00 WELLNESS EXAMINATION: Primary | ICD-10-CM

## 2021-10-27 DIAGNOSIS — Z13.6 SCREENING FOR CARDIOVASCULAR CONDITION: ICD-10-CM

## 2021-10-27 DIAGNOSIS — E03.9 HYPOTHYROIDISM (ACQUIRED): ICD-10-CM

## 2021-10-27 RX ORDER — LEVOTHYROXINE SODIUM 75 UG/1
75 TABLET ORAL
Qty: 90 TABLET | Refills: 0 | Status: SHIPPED | OUTPATIENT
Start: 2021-10-27

## 2022-01-07 ENCOUNTER — TELEPHONE (OUTPATIENT)
Dept: FAMILY MEDICINE | Facility: CLINIC | Age: 77
End: 2022-01-07
Payer: MEDICARE

## 2022-01-14 ENCOUNTER — PATIENT MESSAGE (OUTPATIENT)
Dept: FAMILY MEDICINE | Facility: CLINIC | Age: 77
End: 2022-01-14
Payer: MEDICARE

## 2022-02-01 ENCOUNTER — PATIENT MESSAGE (OUTPATIENT)
Dept: FAMILY MEDICINE | Facility: CLINIC | Age: 77
End: 2022-02-01
Payer: MEDICARE

## 2022-05-31 ENCOUNTER — PATIENT MESSAGE (OUTPATIENT)
Dept: ADMINISTRATIVE | Facility: HOSPITAL | Age: 77
End: 2022-05-31
Payer: MEDICARE

## (undated) DEVICE — BIT DRILL X-FUSE 3 MM

## (undated) DEVICE — SUT BONE WAX 2.5 GRMS 12/BX

## (undated) DEVICE — Device

## (undated) DEVICE — BLADE SAW NRRW MED 18.5X7

## (undated) DEVICE — SEE MEDLINE ITEM 152622

## (undated) DEVICE — PIN 2MM SS EZ CLIP
Type: IMPLANTABLE DEVICE | Site: TOE | Status: NON-FUNCTIONAL
Removed: 2020-11-18

## (undated) DEVICE — DRAPE STERI-DRAPE 1000 17X11IN

## (undated) DEVICE — SEE MEDLINE ITEM 157116

## (undated) DEVICE — SEE L#120831

## (undated) DEVICE — WIRE-K 20MM X 150MM.
Type: IMPLANTABLE DEVICE | Site: TOE | Status: NON-FUNCTIONAL
Removed: 2020-11-18

## (undated) DEVICE — ELECTRODE REM PLYHSV RETURN 9

## (undated) DEVICE — SUT ETHILON 3-0 PS2 18 BLK

## (undated) DEVICE — SLEEVE SCD EXPRESS CALF MEDIUM

## (undated) DEVICE — DURAPREP SURG SCRUB 26ML

## (undated) DEVICE — BURR OVAL DMND 4.0MM LONG.

## (undated) DEVICE — BANDAGE ESMARK LATEX FREE 4INX

## (undated) DEVICE — GAUZE SPONGE 4X4 12PLY

## (undated) DEVICE — SUT MONOCRYL 3-0 SH U/D

## (undated) DEVICE — SOL 9P NACL IRR PIC IL

## (undated) DEVICE — DRAPE PLASTIC U 60X72

## (undated) DEVICE — GLOVE SURGEONS ULTRA TOUCH 6.5

## (undated) DEVICE — DRESSING XEROFORM FOIL PK 1X8

## (undated) DEVICE — CLIP EASY 13MM
Type: IMPLANTABLE DEVICE | Site: TOE | Status: NON-FUNCTIONAL
Removed: 2020-11-18

## (undated) DEVICE — DRAPE C-ARM FOR MOBILE XRAY

## (undated) DEVICE — SUT MONOCRYL 2-0 S UND

## (undated) DEVICE — PADDING CAST 4IN SPECIALIST

## (undated) DEVICE — SUT MONOCRYL 0 CT-1 UND MON

## (undated) DEVICE — PENCIL ROCKER SWITCH 10FT CORD

## (undated) DEVICE — TOURNIQUET SB QC DP 18X4IN

## (undated) DEVICE — SEE MEDLINE ITEM 146308

## (undated) DEVICE — BLADE SURG #15 CARBON STEEL

## (undated) DEVICE — K-WIRE TRCR PT1.6MM DIA 150MM
Type: IMPLANTABLE DEVICE | Site: TOE | Status: NON-FUNCTIONAL
Removed: 2020-11-18

## (undated) DEVICE — DRAPE STERI U-SHAPED 47X51IN

## (undated) DEVICE — UNDERGLOVE BIOGEL PI SZ 6.5 LF

## (undated) DEVICE — REAMER CALIBRATED 2MM

## (undated) DEVICE — NDL 22GA X1 1/2 REG BEVEL

## (undated) DEVICE — ALCOHOL 70% ISOP RUBBING 4OZ

## (undated) DEVICE — GAUZE SPONGE 8X4 12 PLY

## (undated) DEVICE — BLADE MEDIUM 9MM X 25MM

## (undated) DEVICE — SEE MEDLINE ITEM 146298

## (undated) DEVICE — SEE MEDLINE ITEM 157117

## (undated) DEVICE — SEE MEDLINE ITEM 146313

## (undated) DEVICE — SEE MEDLINE ITEM 157131

## (undated) DEVICE — SEE MEDLINE ITEM 146270